# Patient Record
Sex: FEMALE | Race: ASIAN | Employment: OTHER | URBAN - METROPOLITAN AREA
[De-identification: names, ages, dates, MRNs, and addresses within clinical notes are randomized per-mention and may not be internally consistent; named-entity substitution may affect disease eponyms.]

---

## 2017-09-06 ENCOUNTER — APPOINTMENT (OUTPATIENT)
Dept: PHYSICAL THERAPY | Age: 77
End: 2017-09-06
Payer: MEDICARE

## 2018-04-16 ENCOUNTER — PRIOR ORIGINAL RECORDS (OUTPATIENT)
Dept: OTHER | Age: 78
End: 2018-04-16

## 2018-04-26 ENCOUNTER — HOSPITAL ENCOUNTER (OUTPATIENT)
Dept: CT IMAGING | Facility: HOSPITAL | Age: 78
Discharge: HOME | End: 2018-04-26
Attending: FAMILY MEDICINE

## 2018-04-26 ENCOUNTER — PRIOR ORIGINAL RECORDS (OUTPATIENT)
Dept: OTHER | Age: 78
End: 2018-04-26

## 2018-04-26 DIAGNOSIS — Z13.6 SCREENING FOR HEART DISEASE: ICD-10-CM

## 2018-04-27 ENCOUNTER — PRIOR ORIGINAL RECORDS (OUTPATIENT)
Dept: OTHER | Age: 78
End: 2018-04-27

## 2018-04-27 LAB — UFCT: 1434.33 CA SCORE

## 2018-05-08 ENCOUNTER — PRIOR ORIGINAL RECORDS (OUTPATIENT)
Dept: OTHER | Age: 78
End: 2018-05-08

## 2018-05-15 ENCOUNTER — PRIOR ORIGINAL RECORDS (OUTPATIENT)
Dept: OTHER | Age: 78
End: 2018-05-15

## 2018-05-16 ENCOUNTER — OFFICE VISIT (OUTPATIENT)
Dept: FAMILY MEDICINE CLINIC | Facility: CLINIC | Age: 78
End: 2018-05-16

## 2018-05-16 VITALS
WEIGHT: 125.19 LBS | BODY MASS INDEX: 25.58 KG/M2 | HEART RATE: 84 BPM | RESPIRATION RATE: 16 BRPM | SYSTOLIC BLOOD PRESSURE: 164 MMHG | DIASTOLIC BLOOD PRESSURE: 84 MMHG | HEIGHT: 58.66 IN

## 2018-05-16 DIAGNOSIS — Z12.39 SCREENING FOR MALIGNANT NEOPLASM OF BREAST: ICD-10-CM

## 2018-05-16 DIAGNOSIS — M06.9 RHEUMATOID ARTHRITIS INVOLVING MULTIPLE SITES, UNSPECIFIED RHEUMATOID FACTOR PRESENCE: ICD-10-CM

## 2018-05-16 DIAGNOSIS — I25.10 CORONARY ARTERY DISEASE INVOLVING NATIVE CORONARY ARTERY OF NATIVE HEART WITHOUT ANGINA PECTORIS: ICD-10-CM

## 2018-05-16 DIAGNOSIS — I77.9 ARTERIAL DISEASE (HCC): ICD-10-CM

## 2018-05-16 DIAGNOSIS — R73.03 PREDIABETES: ICD-10-CM

## 2018-05-16 DIAGNOSIS — R73.9 HYPERGLYCEMIA: ICD-10-CM

## 2018-05-16 DIAGNOSIS — Z78.0 POSTMENOPAUSAL: ICD-10-CM

## 2018-05-16 DIAGNOSIS — Z23 NEED FOR VACCINATION: ICD-10-CM

## 2018-05-16 DIAGNOSIS — Z00.00 MEDICARE ANNUAL WELLNESS VISIT, SUBSEQUENT: Primary | ICD-10-CM

## 2018-05-16 DIAGNOSIS — I10 ESSENTIAL HYPERTENSION: ICD-10-CM

## 2018-05-16 DIAGNOSIS — E78.2 MIXED HYPERLIPIDEMIA: ICD-10-CM

## 2018-05-16 PROCEDURE — G0009 ADMIN PNEUMOCOCCAL VACCINE: HCPCS | Performed by: FAMILY MEDICINE

## 2018-05-16 PROCEDURE — 90670 PCV13 VACCINE IM: CPT | Performed by: FAMILY MEDICINE

## 2018-05-16 PROCEDURE — G0438 PPPS, INITIAL VISIT: HCPCS | Performed by: FAMILY MEDICINE

## 2018-05-16 PROCEDURE — 96160 PT-FOCUSED HLTH RISK ASSMT: CPT | Performed by: FAMILY MEDICINE

## 2018-05-16 RX ORDER — ATORVASTATIN CALCIUM 10 MG/1
1 TABLET, FILM COATED ORAL DAILY
COMMUNITY
Start: 2018-04-27 | End: 2019-04-22

## 2018-05-16 RX ORDER — ACETAMINOPHEN 160 MG
2000 TABLET,DISINTEGRATING ORAL DAILY
COMMUNITY
End: 2018-07-18

## 2018-05-16 RX ORDER — LOSARTAN POTASSIUM 25 MG/1
25 TABLET ORAL DAILY
COMMUNITY
Start: 2017-05-18 | End: 2018-10-17

## 2018-05-16 NOTE — PATIENT INSTRUCTIONS
Wagner Walsh's SCREENING SCHEDULE   Tests on this list are recommended by your physician but may not be covered, or covered at this frequency, by your insurer. Please check with your insurance carrier before scheduling to verify coverage.    PREVENTATIVE lifetime   • Anyone with a family history    Colorectal Cancer Screening  Covered up to Age 76     Colonoscopy Screen   Covered every 10 years- more often if abnormal There are no preventive care reminders to display for this patient.  Update [employer] Please get every year    Pneumococcal 13 (Prevnar)  Covered Once after 65 No orders found for this or any previous visit.  Please get once after your 65th birthday    Pneumococcal 23 (Pneumovax)  Covered Once after 65 No orders found for this or any previou

## 2018-05-18 ENCOUNTER — PRIOR ORIGINAL RECORDS (OUTPATIENT)
Dept: OTHER | Age: 78
End: 2018-05-18

## 2018-05-25 ENCOUNTER — HOSPITAL ENCOUNTER (OUTPATIENT)
Dept: CV DIAGNOSTICS | Facility: HOSPITAL | Age: 78
Discharge: HOME | End: 2018-05-25
Attending: INTERNAL MEDICINE
Payer: MEDICARE

## 2018-05-25 DIAGNOSIS — R93.1 ABNORMAL CT SCAN OF HEART: ICD-10-CM

## 2018-05-25 DIAGNOSIS — R07.9 CHEST PAIN: ICD-10-CM

## 2018-05-25 PROCEDURE — 93018 CV STRESS TEST I&R ONLY: CPT | Performed by: INTERNAL MEDICINE

## 2018-05-25 PROCEDURE — 78452 HT MUSCLE IMAGE SPECT MULT: CPT | Performed by: INTERNAL MEDICINE

## 2018-05-25 PROCEDURE — 93017 CV STRESS TEST TRACING ONLY: CPT | Performed by: INTERNAL MEDICINE

## 2018-05-29 ENCOUNTER — PRIOR ORIGINAL RECORDS (OUTPATIENT)
Dept: OTHER | Age: 78
End: 2018-05-29

## 2018-06-09 ENCOUNTER — PATIENT OUTREACH (OUTPATIENT)
Dept: FAMILY MEDICINE CLINIC | Facility: CLINIC | Age: 78
End: 2018-06-09

## 2018-06-13 ENCOUNTER — LAB ENCOUNTER (OUTPATIENT)
Dept: LAB | Age: 78
End: 2018-06-13
Attending: INTERNAL MEDICINE
Payer: MEDICARE

## 2018-06-13 ENCOUNTER — PRIOR ORIGINAL RECORDS (OUTPATIENT)
Dept: OTHER | Age: 78
End: 2018-06-13

## 2018-06-13 DIAGNOSIS — E78.00 PURE HYPERCHOLESTEROLEMIA: Primary | ICD-10-CM

## 2018-06-13 PROCEDURE — 36415 COLL VENOUS BLD VENIPUNCTURE: CPT

## 2018-06-13 PROCEDURE — 80061 LIPID PANEL: CPT

## 2018-06-13 PROCEDURE — 80053 COMPREHEN METABOLIC PANEL: CPT

## 2018-06-19 ENCOUNTER — PRIOR ORIGINAL RECORDS (OUTPATIENT)
Dept: OTHER | Age: 78
End: 2018-06-19

## 2018-06-19 LAB
ALBUMIN: 4.5 G/DL
ALKALINE PHOSPHATATE(ALK PHOS): 59 IU/L
ALT (SGPT): 28 U/L
AST (SGOT): 20 U/L
BILIRUBIN TOTAL: 0.6 MG/DL
BUN: 15 MG/DL
CALCIUM: 9.6 MG/DL
CHLORIDE: 105 MEQ/L
CHOLESTEROL, TOTAL: 164 MG/DL
CREATININE, SERUM: 0.6 MG/DL
GLUCOSE: 125 MG/DL
GLUCOSE: 125 MG/DL
HDL CHOLESTEROL: 75 MG/DL
LDL CHOLESTEROL: 64 MG/DL
NON-HDL CHOLESTEROL: 89 MG/DL
POTASSIUM, SERUM: 4.1 MEQ/L
PROTEIN, TOTAL: 8.4 G/DL
SGOT (AST): 20 IU/L
SGPT (ALT): 28 IU/L
SODIUM: 141 MEQ/L
TRIGLYCERIDES: 124 MG/DL

## 2018-06-26 ENCOUNTER — TELEPHONE (OUTPATIENT)
Dept: FAMILY MEDICINE CLINIC | Facility: CLINIC | Age: 78
End: 2018-06-26

## 2018-06-26 ENCOUNTER — PRIOR ORIGINAL RECORDS (OUTPATIENT)
Dept: OTHER | Age: 78
End: 2018-06-26

## 2018-06-26 NOTE — TELEPHONE ENCOUNTER
Wagner would like a phone call from the office regarding her test results, she has a few more questions, please call her at [phone]

## 2018-06-26 NOTE — TELEPHONE ENCOUNTER
Pt calling to verify when she needs to have labs completed, pt informed labs needs to be completed around July 16.   Future Appointments  Date Time Provider Bart Aaron   7/18/2018 8:30 AM Tony Stallings DO [facility]

## 2018-07-11 ENCOUNTER — PRIOR ORIGINAL RECORDS (OUTPATIENT)
Dept: OTHER | Age: 78
End: 2018-07-11

## 2018-07-11 ENCOUNTER — TELEPHONE (OUTPATIENT)
Dept: FAMILY MEDICINE CLINIC | Facility: CLINIC | Age: 78
End: 2018-07-11

## 2018-07-11 RX ORDER — GLUCOSAMINE HCL/CHONDROITIN SU 500-400 MG
CAPSULE ORAL
Qty: 100 STRIP | Refills: 1 | Status: SHIPPED | OUTPATIENT
Start: 2018-07-11 | End: 2018-07-11

## 2018-07-11 RX ORDER — LANCETS 30 GAUGE
EACH MISCELLANEOUS
Qty: 100 EACH | Refills: 1 | Status: SHIPPED | OUTPATIENT
Start: 2018-07-11 | End: 2018-07-11

## 2018-07-11 RX ORDER — LANCETS 30 GAUGE
EACH MISCELLANEOUS
Qty: 100 EACH | Refills: 1 | Status: SHIPPED | OUTPATIENT
Start: 2018-07-11 | End: 2021-06-01

## 2018-07-11 RX ORDER — BLOOD SUGAR DIAGNOSTIC, DRUM
STRIP MISCELLANEOUS
Qty: 306 STRIP | Refills: 1 | Status: SHIPPED | OUTPATIENT
Start: 2018-07-11 | End: 2019-01-10

## 2018-07-11 NOTE — TELEPHONE ENCOUNTER
Pt called in and said that she is asking for diabetic supplies she used to check her blood sugars in the past but has stopped and would like to have new supplies sent in to her pharmacy to be able to re-check    All she needs is the lancets and strips (the

## 2018-07-16 ENCOUNTER — PRIOR ORIGINAL RECORDS (OUTPATIENT)
Dept: OTHER | Age: 78
End: 2018-07-16

## 2018-07-16 ENCOUNTER — APPOINTMENT (OUTPATIENT)
Dept: LAB | Age: 78
End: 2018-07-16
Attending: FAMILY MEDICINE
Payer: MEDICARE

## 2018-07-16 DIAGNOSIS — R73.9 HYPERGLYCEMIA: ICD-10-CM

## 2018-07-16 DIAGNOSIS — R73.03 PREDIABETES: ICD-10-CM

## 2018-07-16 DIAGNOSIS — I10 ESSENTIAL HYPERTENSION: ICD-10-CM

## 2018-07-16 LAB
EST. AVERAGE GLUCOSE BLD GHB EST-MCNC: 140 MG/DL (ref 68–126)
FREE T4: 1 NG/DL (ref 0.9–1.8)
HBA1C MFR BLD HPLC: 6.5 % (ref ?–5.7)
TSI SER-ACNC: 1.37 MIU/ML (ref 0.35–5.5)

## 2018-07-16 PROCEDURE — 83036 HEMOGLOBIN GLYCOSYLATED A1C: CPT

## 2018-07-16 PROCEDURE — 84439 ASSAY OF FREE THYROXINE: CPT

## 2018-07-16 PROCEDURE — 84443 ASSAY THYROID STIM HORMONE: CPT

## 2018-07-16 PROCEDURE — 36415 COLL VENOUS BLD VENIPUNCTURE: CPT

## 2018-07-18 ENCOUNTER — PRIOR ORIGINAL RECORDS (OUTPATIENT)
Dept: OTHER | Age: 78
End: 2018-07-18

## 2018-07-18 ENCOUNTER — TELEPHONE (OUTPATIENT)
Dept: FAMILY MEDICINE CLINIC | Facility: CLINIC | Age: 78
End: 2018-07-18

## 2018-07-18 ENCOUNTER — OFFICE VISIT (OUTPATIENT)
Dept: FAMILY MEDICINE CLINIC | Facility: CLINIC | Age: 78
End: 2018-07-18
Payer: MEDICARE

## 2018-07-18 VITALS
BODY MASS INDEX: 25.58 KG/M2 | DIASTOLIC BLOOD PRESSURE: 74 MMHG | HEART RATE: 79 BPM | SYSTOLIC BLOOD PRESSURE: 156 MMHG | WEIGHT: 125.19 LBS | RESPIRATION RATE: 16 BRPM | HEIGHT: 58.66 IN

## 2018-07-18 DIAGNOSIS — I25.10 CORONARY ARTERY DISEASE INVOLVING NATIVE CORONARY ARTERY OF NATIVE HEART WITHOUT ANGINA PECTORIS: ICD-10-CM

## 2018-07-18 DIAGNOSIS — I10 ESSENTIAL HYPERTENSION: ICD-10-CM

## 2018-07-18 DIAGNOSIS — I77.9 ARTERIAL DISEASE (HCC): ICD-10-CM

## 2018-07-18 DIAGNOSIS — R21 RASH: ICD-10-CM

## 2018-07-18 DIAGNOSIS — R73.9 HYPERGLYCEMIA: Primary | ICD-10-CM

## 2018-07-18 PROCEDURE — 99214 OFFICE O/P EST MOD 30 MIN: CPT | Performed by: FAMILY MEDICINE

## 2018-07-18 RX ORDER — EZETIMIBE 10 MG/1
10 TABLET ORAL NIGHTLY
COMMUNITY
End: 2018-07-19 | Stop reason: SINTOL

## 2018-07-18 RX ORDER — CLOTRIMAZOLE AND BETAMETHASONE DIPROPIONATE 10; .64 MG/G; MG/G
1 CREAM TOPICAL 2 TIMES DAILY
Qty: 45 G | Refills: 0 | Status: SHIPPED | OUTPATIENT
Start: 2018-07-18 | End: 2018-12-27 | Stop reason: ALTCHOICE

## 2018-07-18 NOTE — PATIENT INSTRUCTIONS
Established High Blood Pressure    High blood pressure (hypertension) is a chronic disease. Often, healthcare providers don’t know what causes it. But it can be caused by certain health conditions and medicines.   If you have high blood pressure, you may · Start an exercise program. Talk with your healthcare provider about the type of exercise program that would be best for you. It doesn't have to be hard. Even brisk walking for 20 minutes 3 times a week is a good form of exercise.   · Don’t take medicines · Sit with your back supported (don't sit on a couch or soft chair); keep your feet on the floor uncrossed. Place your arm on a solid flat surface (like a table) with the upper part of the arm at heart level.  Place the middle of the cuff directly above the · You have problems speaking or seeing   Date Last Reviewed: 12/1/2016  © 4360-5528 [employer]. [address] [facility] [place], [address] [place] [address]. All rights reserved. This information is not intended as a substitute for professional medical care.  A

## 2018-07-18 NOTE — PROGRESS NOTES
Bolivar Oglesby is a 69year old female. HPI:     HTN:    Uncontrolled. Severity is mild, patient is on one agent for her hypertension. Pt has been taking medications as instructed, no medication side effects.    Diet: Has been more closely watching he • Prediabetes 2018   • Rheumatoid arthritis involving multiple sites ([facility] [address] 2018   • Rheumatoid arthritis(714.0)    • Unspecified essential hypertension       Past Surgical History:  No date:   2017: COLONOSCOPY  2017: EGD normal        DATA:    Component      Latest Ref Rng & Units 7/16/2018 6/13/2018 2/27/2018   CHOLESTEROL, TOTAL      <200 mg/dL  164 255   Triglycerides      <150 mg/dL  124 157   HDL Cholesterol      >45 mg/dL  75 71   LDL Cholesterol Calc      <130 mg/dL aspirin 81 mg.      4. Coronary artery disease involving native coronary artery of native heart without angina pectoris  As above in #3.    5. Essential hypertension  Uncontrolled. Per patient she is taking the losartan every day and does not miss doses.

## 2018-07-18 NOTE — TELEPHONE ENCOUNTER
Wagner is calling to let Dr Felisha Barksdale know that she called Dr Alina Rodríguez office regarding her cholesteral med that she was having a reaction to.  Please call Wagner so she can let the nurse know what Dr Sheila Wright would like her to do, [phone]

## 2018-07-19 NOTE — TELEPHONE ENCOUNTER
Jose Stacy is returning a call to Maurice Guerra regarding her Cholesteral, please call her at [phone]

## 2018-07-19 NOTE — TELEPHONE ENCOUNTER
Spoke to patient and she said she called Dr. Kvng Arnold office and pt was told to stop taking it ezetimibe and she states she feels much better. She will be doing labs in 3 weeks    She just wanted to let you know.

## 2018-07-24 ENCOUNTER — PRIOR ORIGINAL RECORDS (OUTPATIENT)
Dept: OTHER | Age: 78
End: 2018-07-24

## 2018-08-27 ENCOUNTER — TELEPHONE (OUTPATIENT)
Dept: FAMILY MEDICINE CLINIC | Facility: CLINIC | Age: 78
End: 2018-08-27

## 2018-08-27 ENCOUNTER — PRIOR ORIGINAL RECORDS (OUTPATIENT)
Dept: OTHER | Age: 78
End: 2018-08-27

## 2018-08-27 NOTE — TELEPHONE ENCOUNTER
Dorothea Grossman is having a lot of pain in her hands her Rheumotoid arthritis is really bothering her, she would like to see if she can get some medication for her pain, please call Wagner at [phone]

## 2018-08-29 NOTE — TELEPHONE ENCOUNTER
What has the patient tried for the pain so far? If she did take something did not help a little, not at all or a lot?   Does she have a rheumatologist?

## 2018-09-01 ENCOUNTER — HOSPITAL ENCOUNTER (OUTPATIENT)
Dept: MAMMOGRAPHY | Age: 78
Discharge: HOME | End: 2018-09-01
Attending: FAMILY MEDICINE
Payer: MEDICARE

## 2018-09-01 ENCOUNTER — HOSPITAL ENCOUNTER (OUTPATIENT)
Dept: BONE DENSITY | Age: 78
Discharge: HOME | End: 2018-09-01
Attending: FAMILY MEDICINE
Payer: MEDICARE

## 2018-09-01 DIAGNOSIS — Z78.0 POSTMENOPAUSAL: ICD-10-CM

## 2018-09-01 DIAGNOSIS — Z12.39 SCREENING FOR MALIGNANT NEOPLASM OF BREAST: ICD-10-CM

## 2018-09-01 PROCEDURE — 77063 BREAST TOMOSYNTHESIS BI: CPT | Performed by: FAMILY MEDICINE

## 2018-09-01 PROCEDURE — 77080 DXA BONE DENSITY AXIAL: CPT | Performed by: FAMILY MEDICINE

## 2018-09-01 PROCEDURE — 77067 SCR MAMMO BI INCL CAD: CPT | Performed by: FAMILY MEDICINE

## 2018-09-11 ENCOUNTER — TELEPHONE (OUTPATIENT)
Dept: FAMILY MEDICINE CLINIC | Facility: CLINIC | Age: 78
End: 2018-09-11

## 2018-09-11 NOTE — TELEPHONE ENCOUNTER
----- Message from Bran Ibarra DO sent at 9/8/2018 10:42 PM CDT -----  Please inform patient that her bone density is normal. Bone mineral density values are within normal range when compared to normal patient population.

## 2018-09-11 NOTE — TELEPHONE ENCOUNTER
The patient was informed of her test results and Dr. Erin Dobbs recommendations. She verbalized understanding and has no further questions at this time.

## 2018-10-17 ENCOUNTER — TELEPHONE (OUTPATIENT)
Dept: FAMILY MEDICINE CLINIC | Facility: CLINIC | Age: 78
End: 2018-10-17

## 2018-10-17 RX ORDER — LOSARTAN POTASSIUM 25 MG/1
25 TABLET ORAL DAILY
Qty: 90 TABLET | Refills: 0 | Status: SHIPPED | OUTPATIENT
Start: 2018-10-17 | End: 2018-11-01

## 2018-10-17 NOTE — TELEPHONE ENCOUNTER
Wagner needs a refill on her Losartan Potassium 25 MG Oral Tab sent to her [address] on file, the pharmacy has sent Dr Korey Blancas a request but has not heard back.

## 2018-10-22 ENCOUNTER — PRIOR ORIGINAL RECORDS (OUTPATIENT)
Dept: OTHER | Age: 78
End: 2018-10-22

## 2018-10-22 ENCOUNTER — LAB ENCOUNTER (OUTPATIENT)
Dept: LAB | Age: 78
End: 2018-10-22
Attending: FAMILY MEDICINE
Payer: MEDICARE

## 2018-10-22 DIAGNOSIS — E78.00 PURE HYPERCHOLESTEROLEMIA: ICD-10-CM

## 2018-10-22 DIAGNOSIS — R73.9 HYPERGLYCEMIA: Primary | ICD-10-CM

## 2018-10-22 PROCEDURE — 83036 HEMOGLOBIN GLYCOSYLATED A1C: CPT

## 2018-10-22 PROCEDURE — 80061 LIPID PANEL: CPT

## 2018-10-22 PROCEDURE — 36415 COLL VENOUS BLD VENIPUNCTURE: CPT

## 2018-10-22 PROCEDURE — 80053 COMPREHEN METABOLIC PANEL: CPT

## 2018-10-23 LAB
ALBUMIN: 4.8 G/DL
ALKALINE PHOSPHATATE(ALK PHOS): 62 IU/L
ALT (SGPT): 25 U/L
AST (SGOT): 19 U/L
BILIRUBIN TOTAL: 0.5 MG/DL
BUN: 19 MG/DL
CALCIUM: 9.5 MG/DL
CHLORIDE: 104 MEQ/L
CHOLESTEROL, TOTAL: 168 MG/DL
CREATININE, SERUM: 0.64 MG/DL
GLOBULIN: 4.1 G/DL
GLUCOSE: 140 MG/DL
GLUCOSE: 140 MG/DL
HDL CHOLESTEROL: 78 MG/DL
HEMOGLOBIN A1C: 6.6 %
LDL CHOLESTEROL: 70 MG/DL
NON-HDL CHOLESTEROL: 90 MG/DL
POTASSIUM, SERUM: 4 MEQ/L
PROTEIN, TOTAL: 8.9 G/DL
SGOT (AST): 19 IU/L
SGPT (ALT): 25 IU/L
SODIUM: 139 MEQ/L
TRIGLYCERIDES: 100 MG/DL

## 2018-10-29 ENCOUNTER — MYAURORA ACCOUNT LINK (OUTPATIENT)
Dept: OTHER | Age: 78
End: 2018-10-29

## 2018-10-29 ENCOUNTER — PRIOR ORIGINAL RECORDS (OUTPATIENT)
Dept: OTHER | Age: 78
End: 2018-10-29

## 2018-11-01 ENCOUNTER — OFFICE VISIT (OUTPATIENT)
Dept: FAMILY MEDICINE CLINIC | Facility: CLINIC | Age: 78
End: 2018-11-01
Payer: MEDICARE

## 2018-11-01 VITALS
WEIGHT: 125 LBS | BODY MASS INDEX: 26 KG/M2 | SYSTOLIC BLOOD PRESSURE: 126 MMHG | HEART RATE: 74 BPM | DIASTOLIC BLOOD PRESSURE: 62 MMHG

## 2018-11-01 DIAGNOSIS — I10 ESSENTIAL HYPERTENSION: ICD-10-CM

## 2018-11-01 DIAGNOSIS — Z28.21 INFLUENZA VACCINATION DECLINED BY PATIENT: ICD-10-CM

## 2018-11-01 DIAGNOSIS — R77.9 ELEVATED SERUM PROTEIN LEVEL: ICD-10-CM

## 2018-11-01 DIAGNOSIS — E11.9 TYPE 2 DIABETES MELLITUS WITHOUT COMPLICATION, WITHOUT LONG-TERM CURRENT USE OF INSULIN (HCC): Primary | ICD-10-CM

## 2018-11-01 PROBLEM — R73.9 HYPERGLYCEMIA: Status: RESOLVED | Noted: 2018-05-16 | Resolved: 2018-11-01

## 2018-11-01 PROBLEM — R73.03 PREDIABETES: Status: RESOLVED | Noted: 2018-05-16 | Resolved: 2018-11-01

## 2018-11-01 PROCEDURE — 99214 OFFICE O/P EST MOD 30 MIN: CPT | Performed by: FAMILY MEDICINE

## 2018-11-01 RX ORDER — PETROLATUM,WHITE/LANOLIN
OINTMENT (GRAM) TOPICAL DAILY
COMMUNITY
End: 2021-03-29

## 2018-11-01 RX ORDER — MULTIVIT-MIN/IRON/FOLIC ACID/K 18-600-40
CAPSULE ORAL
COMMUNITY
End: 2019-04-22 | Stop reason: CLARIF

## 2018-11-01 RX ORDER — LOSARTAN POTASSIUM AND HYDROCHLOROTHIAZIDE 12.5; 5 MG/1; MG/1
1 TABLET ORAL DAILY
COMMUNITY
End: 2018-12-27

## 2018-11-01 NOTE — PROGRESS NOTES
Ray Cardenas is a 69year old female. HPI:     HTN:    Improving. Severity is mild, patient is on 2 agents for her hypertension.   Patient recently had medication changed by her cardiologist.  Tolerating medication change well, no side effects notice Rheumatoid arthritis(714.0)    • Unspecified essential hypertension       Past Surgical History:   Procedure Laterality Date   •      • COLONOSCOPY  2017   • EGD  2017   • ORAL SURGERY PROCEDURE        Social History:    Social History interview        DATA:    Component      Latest Ref Rng & Units 10/22/2018 7/16/2018 6/13/2018   CHOLESTEROL, TOTAL      <200 mg/dL 168  164   Triglycerides      30 - 149 mg/dL 100  124   HDL Cholesterol      40 - 59 mg/dL 78 (H)  75   LDL Cholesterol Calc

## 2018-11-02 LAB
FREE T4: 1 MG/DL
HEMOGLOBIN A1C: 6.5 %
THYROID STIMULATING HORMONE: 1.37 MLU/L

## 2018-11-05 ENCOUNTER — APPOINTMENT (OUTPATIENT)
Dept: LAB | Age: 78
End: 2018-11-05
Attending: FAMILY MEDICINE
Payer: MEDICARE

## 2018-11-05 DIAGNOSIS — R77.9 ELEVATED SERUM PROTEIN LEVEL: ICD-10-CM

## 2018-11-05 PROCEDURE — 86334 IMMUNOFIX E-PHORESIS SERUM: CPT

## 2018-11-05 PROCEDURE — 83883 ASSAY NEPHELOMETRY NOT SPEC: CPT

## 2018-11-05 PROCEDURE — 84165 PROTEIN E-PHORESIS SERUM: CPT

## 2018-11-05 PROCEDURE — 36415 COLL VENOUS BLD VENIPUNCTURE: CPT

## 2018-11-07 ENCOUNTER — DIABETIC EDUCATION (OUTPATIENT)
Dept: ENDOCRINOLOGY CLINIC | Facility: CLINIC | Age: 78
End: 2018-11-07
Payer: MEDICARE

## 2018-11-07 DIAGNOSIS — E11.9 TYPE 2 DIABETES MELLITUS WITHOUT COMPLICATION, WITHOUT LONG-TERM CURRENT USE OF INSULIN (HCC): Primary | ICD-10-CM

## 2018-11-07 PROCEDURE — 97802 MEDICAL NUTRITION INDIV IN: CPT | Performed by: DIETITIAN, REGISTERED

## 2018-11-07 NOTE — PROGRESS NOTES
Robert Luna  [id number] was seen for Diabetic Medical Nutrition Therapy:    Date: 11/7/2018  Start time: 11:00 End time: 12:00    Assessment: There were no vitals taken for this visit.   HgbA1C (%)   Date Value   10/22/2018 6.6 (H)   02/27/2018 5.7

## 2018-11-27 ENCOUNTER — TELEPHONE (OUTPATIENT)
Dept: FAMILY MEDICINE CLINIC | Facility: CLINIC | Age: 78
End: 2018-11-27

## 2018-11-27 NOTE — TELEPHONE ENCOUNTER
----- Message from Caridad Brito DO sent at 11/26/2018  9:14 PM CST -----  Please inform patient blood test is okay.

## 2018-11-27 NOTE — TELEPHONE ENCOUNTER
Spoke with patient and informed her of lab results. Pt verbalized understanding and had no questions or concerns at this time.

## 2018-12-14 ENCOUNTER — PRIOR ORIGINAL RECORDS (OUTPATIENT)
Dept: OTHER | Age: 78
End: 2018-12-14

## 2018-12-17 ENCOUNTER — PRIOR ORIGINAL RECORDS (OUTPATIENT)
Dept: OTHER | Age: 78
End: 2018-12-17

## 2018-12-26 ENCOUNTER — TELEPHONE (OUTPATIENT)
Dept: INTERNAL MEDICINE CLINIC | Facility: CLINIC | Age: 78
End: 2018-12-26

## 2018-12-26 NOTE — TELEPHONE ENCOUNTER
Pt was seen by Holli Gould on 11/07 for diabetic education and was given recommendation regarding her diet. She stated that she was recently diagnosed rheumatoid and due to this she will need to be in a more strict diet. She was wondering if Jessica Loyola has recommendation.

## 2018-12-27 ENCOUNTER — OFFICE VISIT (OUTPATIENT)
Dept: RHEUMATOLOGY | Facility: CLINIC | Age: 78
End: 2018-12-27
Payer: MEDICARE

## 2018-12-27 VITALS
RESPIRATION RATE: 20 BRPM | BODY MASS INDEX: 26.24 KG/M2 | WEIGHT: 125 LBS | SYSTOLIC BLOOD PRESSURE: 144 MMHG | DIASTOLIC BLOOD PRESSURE: 68 MMHG | HEART RATE: 76 BPM | HEIGHT: 58 IN

## 2018-12-27 DIAGNOSIS — M25.552 LEFT HIP PAIN: ICD-10-CM

## 2018-12-27 DIAGNOSIS — M15.9 PRIMARY OSTEOARTHRITIS INVOLVING MULTIPLE JOINTS: ICD-10-CM

## 2018-12-27 DIAGNOSIS — M79.641 BILATERAL HAND PAIN: ICD-10-CM

## 2018-12-27 DIAGNOSIS — H04.123 DRY EYE SYNDROME OF BOTH EYES: ICD-10-CM

## 2018-12-27 DIAGNOSIS — M06.9 RHEUMATOID ARTHRITIS INVOLVING BOTH HANDS, UNSPECIFIED RHEUMATOID FACTOR PRESENCE: Primary | ICD-10-CM

## 2018-12-27 DIAGNOSIS — M79.642 BILATERAL HAND PAIN: ICD-10-CM

## 2018-12-27 PROCEDURE — 99205 OFFICE O/P NEW HI 60 MIN: CPT | Performed by: INTERNAL MEDICINE

## 2018-12-27 RX ORDER — PREDNISONE 1 MG/1
TABLET ORAL
Qty: 50 TABLET | Refills: 0 | Status: SHIPPED | OUTPATIENT
Start: 2018-12-27 | End: 2019-04-22

## 2018-12-27 RX ORDER — LOSARTAN POTASSIUM 25 MG/1
25 TABLET ORAL DAILY
COMMUNITY
End: 2019-06-17

## 2018-12-27 RX ORDER — HYDROXYCHLOROQUINE SULFATE 200 MG/1
200 TABLET, FILM COATED ORAL DAILY
Qty: 90 TABLET | Refills: 0 | Status: SHIPPED | OUTPATIENT
Start: 2018-12-27 | End: 2019-04-22

## 2018-12-27 NOTE — PROGRESS NOTES
?  RHEUMATOLOGY NEW PATIENT   Date of visit: 12/27/2018  ?   Patient presents with:  Establish Care: new Patient referred by PCP with RA-was in remission, Was treated 16 years ago, was on Methotrexate and developed anemia, MTX and celebrex along with NSAI stools. Rarely, Plaquenil can also be associated with myopathy. This was discussed in detail, and it was agreed that the benefit of this medication outweighs its risk. Will also get updated bilateral hand x-rays as well as bilateral hip x-rays.   I belie (MIN 3 VIEWS), LEFT (CPT=73130); Future  -     XR HAND (MIN 3 VIEWS), RIGHT (CPT=73130); Future    Left hip pain  -     XR HIP W OR WO PELVIS 3 OR 4 VIEWS, BILAT(CPT=73522);  Future    Primary osteoarthritis involving multiple joints    Dry eye syndrome of over her joints lasting about 2-3 hours each more. Does have significant fatigue since the pain started. Has had some hair loss.    Does get a redness and itching over her thigh and calf at times, states she is very sensitive to different perfumes and l      • COLONOSCOPY  2017   • EGD  2017   • ORAL SURGERY PROCEDURE       Family History:  No family history on file.   Social History:  Social History    Tobacco Use      Smoking status: Never Smoker      Smokeless tobacco: Never Used breath. Cardiovascular: Negative for chest pain, palpitations and leg swelling. Gastrointestinal: Negative for constipation and heartburn. Genitourinary: Negative for dysuria, frequency and urgency.    Musculoskeletal: Positive for falls and myalgias Left MCP 1 tender and swollen  [place] neck deformities noted of right middle and left pinky finger.   Early boutonierre deformities of bilateral thumbs    No other swelling, tenderness, redness or restriction of motion of the DIPs, PIPs, MCPs, wrists, elbow additional findings.       =====  CONCLUSION:  Bone mineral density values are within normal range when compared to normal patient population.         Labs:  Lab Results   Component Value Date    WBC 8.2 09/12/2014    RBC 3.82 09/12/2014    HGB 12.3 09/12/2

## 2018-12-27 NOTE — PATIENT INSTRUCTIONS
-- start prednisone taper tomorrow (20mg daily x 5 days then decrease by 5mg every 5 days, follow directions on prescription). Be sure to monitor your blood pressure and blood sugar closely.   -- start plaquenil daily and be sure to see eye doctor   -- read mouth with a glass of water. Follow the directions on the prescription label. Avoid taking antacids within 4 hours of taking this medicine. It is best to separate these medicines by at least 4 hours. Do not cut, crush or chew this medicine.  You can take it doctor or health care professional if they continue or are bothersome):  · anxious  · diarrhea  · dizziness  · hair loss  · headache  · irritable  · loss of appetite  · nausea, vomiting  · stomach pain  What may interact with this medicine?   Do not take th start to get better or if they get worse. Avoid taking antacids within 4 hours of taking this medicine. It is best to separate these medicines by at least 4 hours.   Tell your doctor or health care professional right away if you have any change in your eye

## 2019-01-10 ENCOUNTER — TELEPHONE (OUTPATIENT)
Dept: FAMILY MEDICINE CLINIC | Facility: CLINIC | Age: 79
End: 2019-01-10

## 2019-01-10 RX ORDER — BLOOD SUGAR DIAGNOSTIC, DRUM
STRIP MISCELLANEOUS
Qty: 200 STRIP | Refills: 1 | Status: SHIPPED | OUTPATIENT
Start: 2019-01-10 | End: 2021-06-01

## 2019-01-10 NOTE — TELEPHONE ENCOUNTER
Pt called and said she tests twice a day and her pharmacy does not know how many test strips to give her. She also said the meter she has is very complicated to use. The people at [place] do not help her very well.   She said she will have [facility] con

## 2019-02-02 ENCOUNTER — LAB ENCOUNTER (OUTPATIENT)
Dept: LAB | Age: 79
End: 2019-02-02
Attending: INTERNAL MEDICINE
Payer: MEDICARE

## 2019-02-02 ENCOUNTER — HOSPITAL ENCOUNTER (OUTPATIENT)
Dept: GENERAL RADIOLOGY | Age: 79
Discharge: HOME | End: 2019-02-02
Attending: INTERNAL MEDICINE
Payer: MEDICARE

## 2019-02-02 DIAGNOSIS — M06.9 RHEUMATOID ARTHRITIS INVOLVING BOTH HANDS, UNSPECIFIED RHEUMATOID FACTOR PRESENCE: ICD-10-CM

## 2019-02-02 DIAGNOSIS — E11.9 TYPE 2 DIABETES MELLITUS WITHOUT COMPLICATION, WITHOUT LONG-TERM CURRENT USE OF INSULIN (HCC): ICD-10-CM

## 2019-02-02 DIAGNOSIS — M79.642 BILATERAL HAND PAIN: ICD-10-CM

## 2019-02-02 DIAGNOSIS — M79.641 BILATERAL HAND PAIN: ICD-10-CM

## 2019-02-02 DIAGNOSIS — H04.123 DRY EYE SYNDROME OF BOTH EYES: ICD-10-CM

## 2019-02-02 DIAGNOSIS — M25.552 LEFT HIP PAIN: ICD-10-CM

## 2019-02-02 LAB
BASOPHILS # BLD AUTO: 0.02 X10(3) UL (ref 0–0.2)
BASOPHILS NFR BLD AUTO: 0.4 %
CRP SERPL-MCNC: 0.87 MG/DL (ref ?–1)
DEPRECATED RDW RBC AUTO: 44.6 FL (ref 35.1–46.3)
EOSINOPHIL # BLD AUTO: 0.12 X10(3) UL (ref 0–0.7)
EOSINOPHIL NFR BLD AUTO: 2.4 %
ERYTHROCYTE [DISTWIDTH] IN BLOOD BY AUTOMATED COUNT: 12.3 % (ref 11–15)
EST. AVERAGE GLUCOSE BLD GHB EST-MCNC: 151 MG/DL (ref 68–126)
HBA1C MFR BLD HPLC: 6.9 % (ref ?–5.7)
HCT VFR BLD AUTO: 36.4 % (ref 35–48)
HGB BLD-MCNC: 11.9 G/DL (ref 12–16)
IMM GRANULOCYTES # BLD AUTO: 0.01 X10(3) UL (ref 0–1)
IMM GRANULOCYTES NFR BLD: 0.2 %
LYMPHOCYTES # BLD AUTO: 1.28 X10(3) UL (ref 1–4)
LYMPHOCYTES NFR BLD AUTO: 25.3 %
MCH RBC QN AUTO: 32.2 PG (ref 26–34)
MCHC RBC AUTO-ENTMCNC: 32.7 G/DL (ref 31–37)
MCV RBC AUTO: 98.4 FL (ref 80–100)
MONOCYTES # BLD AUTO: 0.27 X10(3) UL (ref 0.1–1)
MONOCYTES NFR BLD AUTO: 5.3 %
NEUTROPHILS # BLD AUTO: 3.35 X10 (3) UL (ref 1.5–7.7)
NEUTROPHILS # BLD AUTO: 3.35 X10(3) UL (ref 1.5–7.7)
NEUTROPHILS NFR BLD AUTO: 66.4 %
PLATELET # BLD AUTO: 251 10(3)UL (ref 150–450)
RBC # BLD AUTO: 3.7 X10(6)UL (ref 3.8–5.3)
RHEUMATOID FACT SERPL-ACNC: 10 IU/ML (ref ?–15)
SED RATE-ML: 15 MM/HR (ref 0–25)
WBC # BLD AUTO: 5.1 X10(3) UL (ref 4–11)

## 2019-02-02 PROCEDURE — 83036 HEMOGLOBIN GLYCOSYLATED A1C: CPT

## 2019-02-02 PROCEDURE — 36415 COLL VENOUS BLD VENIPUNCTURE: CPT

## 2019-02-02 PROCEDURE — 86225 DNA ANTIBODY NATIVE: CPT

## 2019-02-02 PROCEDURE — 73523 X-RAY EXAM HIPS BI 5/> VIEWS: CPT | Performed by: INTERNAL MEDICINE

## 2019-02-02 PROCEDURE — 86431 RHEUMATOID FACTOR QUANT: CPT

## 2019-02-02 PROCEDURE — 85025 COMPLETE CBC W/AUTO DIFF WBC: CPT

## 2019-02-02 PROCEDURE — 73130 X-RAY EXAM OF HAND: CPT | Performed by: INTERNAL MEDICINE

## 2019-02-02 PROCEDURE — 86200 CCP ANTIBODY: CPT

## 2019-02-02 PROCEDURE — 86235 NUCLEAR ANTIGEN ANTIBODY: CPT

## 2019-02-02 PROCEDURE — 86140 C-REACTIVE PROTEIN: CPT

## 2019-02-02 PROCEDURE — 85652 RBC SED RATE AUTOMATED: CPT

## 2019-02-02 PROCEDURE — 86038 ANTINUCLEAR ANTIBODIES: CPT

## 2019-02-04 LAB
ANA SCREEN: POSITIVE
CENTROMERE AUTOAB: <100 AU/ML (ref ?–100)
CYCLIC CITRULLINATED PEPTIDE: 181 UNITS
DSDNA AUTOAB: <100 IU/ML (ref ?–100)
HISTONE AUTOAB: <100 AU/ML (ref ?–100)
JO-1 AUTOAB: <100 AU/ML (ref ?–100)
RNP AUTOAB: 154 AU/ML (ref ?–100)
SCL-70 AUTOAB: <100 AU/ML (ref ?–100)
SM AUTOAB (SMITH): <100 AU/ML (ref ?–100)
SSA AUTOAB: 168 AU/ML (ref ?–100)
SSB AUTOAB: <100 AU/ML (ref ?–100)

## 2019-02-06 ENCOUNTER — TELEPHONE (OUTPATIENT)
Dept: RHEUMATOLOGY | Facility: CLINIC | Age: 79
End: 2019-02-06

## 2019-02-07 ENCOUNTER — TELEPHONE (OUTPATIENT)
Dept: FAMILY MEDICINE CLINIC | Facility: CLINIC | Age: 79
End: 2019-02-07

## 2019-02-07 NOTE — TELEPHONE ENCOUNTER
----- Message from Karla Mcfarland DO sent at 2/6/2019  9:54 PM CST -----  Please call patient: Please have patient make an appointment to see me for follow-up, A1c is a little worse.     Spoke to patient and she made appt for 2/18 at 12 noon

## 2019-02-11 ENCOUNTER — OFFICE VISIT (OUTPATIENT)
Dept: RHEUMATOLOGY | Facility: CLINIC | Age: 79
End: 2019-02-11
Payer: MEDICARE

## 2019-02-11 VITALS
SYSTOLIC BLOOD PRESSURE: 160 MMHG | RESPIRATION RATE: 20 BRPM | TEMPERATURE: 98 F | HEART RATE: 87 BPM | BODY MASS INDEX: 26.24 KG/M2 | DIASTOLIC BLOOD PRESSURE: 85 MMHG | HEIGHT: 58 IN | WEIGHT: 125 LBS

## 2019-02-11 DIAGNOSIS — M06.9 RHEUMATOID ARTHRITIS INVOLVING BOTH HANDS, UNSPECIFIED RHEUMATOID FACTOR PRESENCE: Primary | ICD-10-CM

## 2019-02-11 DIAGNOSIS — M79.641 BILATERAL HAND PAIN: ICD-10-CM

## 2019-02-11 DIAGNOSIS — M25.552 LEFT HIP PAIN: ICD-10-CM

## 2019-02-11 DIAGNOSIS — M15.9 PRIMARY OSTEOARTHRITIS INVOLVING MULTIPLE JOINTS: ICD-10-CM

## 2019-02-11 DIAGNOSIS — H04.123 DRY EYE SYNDROME OF BOTH EYES: ICD-10-CM

## 2019-02-11 DIAGNOSIS — M79.642 BILATERAL HAND PAIN: ICD-10-CM

## 2019-02-11 PROBLEM — M15.0 PRIMARY OSTEOARTHRITIS INVOLVING MULTIPLE JOINTS: Status: ACTIVE | Noted: 2019-02-11

## 2019-02-11 PROCEDURE — 99215 OFFICE O/P EST HI 40 MIN: CPT | Performed by: INTERNAL MEDICINE

## 2019-02-11 NOTE — PROGRESS NOTES
?  RHEUMATOLOGY Follow up   Date of visit: 2/11/2019  ?   Patient presents with:  Establish Care: test results- Pt states she finished therapy of prednisone-states that when she was on it -felt so great that she wanted to travel to [place], after completing hip x-rays. Patient will follow-up in about 2 months or sooner as needed.   Again just as last time I advised that the patient discuss her antihypertensive as well as statin therapy with her cardiologist if she continues to be interested in stopping this m further questions at this time.     ?  Plan:  Diagnoses and all orders for this visit:    Rheumatoid arthritis involving both hands, unspecified rheumatoid factor presence (HCC)    Bilateral hand pain    Left hip pain    Primary osteoarthritis involving mul inflammatory markers as well as RF positivity. She was on methotrexate as well as celebrex in the past for this, however, she had significant acid reflux as well as weight loss and anemia. States she was on the medications for about about 3-4 months.  Since seizures. No history of prior blood clot in the legs or lungs, strokes or ischemic phenomenon. Denies nonhealing ulcers on the fingertips or trouble swallowing.   The patient denies any history of uveitis, crampy abdominal pain, constipation, diarrhea, bl apply Misc Use 1 Accu Chek Compact Plus lancets daily as needed  Dx R73.9, R73.03 Disp: 100 each Rfl: 1     Modified Medications    No medications on file     There are no discontinued medications.   ?  ?  Allergies:    Penicillins             SHORTNESS OF EOM are normal. Pupils are equal, round, and reactive to light. No scleral icterus. Neck: Normal range of motion. Neck supple. No JVD present. No tracheal deviation present.    Cardiovascular: Normal rate, regular rhythm, normal heart sounds and intact di 5th IP joints 1st through 5th MCP joints and marked joint space narrowing with appearance of bony fusion involving the carpus and carpal metacarpal joint space.   There are lucencies consistent with marginal erosions infarct in the carpus and 2nd 3rd and 4t GLOBULIN 4.1 10/22/2018     10/22/2018    K 4.0 10/22/2018     10/22/2018    CO2 26.0 10/22/2018       Additional Labs:  02/2019  CCP positive (181)  RF negative   AARON positive (no titer provided)   (N<100)   (N<100)  Remaining EN

## 2019-02-18 ENCOUNTER — TELEPHONE (OUTPATIENT)
Dept: FAMILY MEDICINE CLINIC | Facility: CLINIC | Age: 79
End: 2019-02-18

## 2019-02-18 NOTE — TELEPHONE ENCOUNTER
Called pt and lmom asking her to call her pharmacy to find out if her brand of losartan was affected by the recall and if it was affected to call our office back. If it was not affected to continue the medication.   Asked pt after reviewing message to call

## 2019-02-28 VITALS
HEART RATE: 72 BPM | HEIGHT: 60 IN | BODY MASS INDEX: 24.35 KG/M2 | DIASTOLIC BLOOD PRESSURE: 78 MMHG | WEIGHT: 124 LBS | SYSTOLIC BLOOD PRESSURE: 154 MMHG

## 2019-02-28 VITALS
WEIGHT: 122 LBS | HEART RATE: 87 BPM | HEIGHT: 60 IN | SYSTOLIC BLOOD PRESSURE: 150 MMHG | DIASTOLIC BLOOD PRESSURE: 80 MMHG | BODY MASS INDEX: 23.95 KG/M2

## 2019-03-08 ENCOUNTER — OFFICE VISIT (OUTPATIENT)
Dept: FAMILY MEDICINE CLINIC | Facility: CLINIC | Age: 79
End: 2019-03-08
Payer: MEDICARE

## 2019-03-08 VITALS
HEART RATE: 88 BPM | BODY MASS INDEX: 26.24 KG/M2 | WEIGHT: 125 LBS | OXYGEN SATURATION: 98 % | RESPIRATION RATE: 16 BRPM | HEIGHT: 58 IN | DIASTOLIC BLOOD PRESSURE: 70 MMHG | SYSTOLIC BLOOD PRESSURE: 130 MMHG | TEMPERATURE: 98 F

## 2019-03-08 DIAGNOSIS — J10.1 INFLUENZA A: Primary | ICD-10-CM

## 2019-03-08 LAB
POCT INFLUENZA A: POSITIVE
POCT INFLUENZA B: NEGATIVE

## 2019-03-08 PROCEDURE — 87502 INFLUENZA DNA AMP PROBE: CPT | Performed by: PHYSICIAN ASSISTANT

## 2019-03-08 PROCEDURE — 99213 OFFICE O/P EST LOW 20 MIN: CPT | Performed by: PHYSICIAN ASSISTANT

## 2019-03-08 RX ORDER — LOSARTAN POTASSIUM 25 MG/1
TABLET ORAL
Qty: 90 TABLET | Refills: 0 | Status: SHIPPED | OUTPATIENT
Start: 2019-03-08 | End: 2019-04-22

## 2019-03-08 RX ORDER — OSELTAMIVIR PHOSPHATE 75 MG/1
75 CAPSULE ORAL 2 TIMES DAILY
Qty: 10 CAPSULE | Refills: 0 | Status: SHIPPED | OUTPATIENT
Start: 2019-03-08 | End: 2019-03-13

## 2019-03-08 RX ORDER — BENZONATATE 200 MG/1
200 CAPSULE ORAL 3 TIMES DAILY PRN
Qty: 21 CAPSULE | Refills: 0 | Status: SHIPPED | OUTPATIENT
Start: 2019-03-08 | End: 2019-03-15

## 2019-03-08 NOTE — PATIENT INSTRUCTIONS
1. Tamiflu  2. Tessalon for cough  3. Increase fluids/ rest  4. Follow up with PCP  5. If worsening symptoms seek treatment    Influenza (Adult)    Influenza is also called the flu. It is a viral illness that affects the air passages of your lungs.  It is Follow up with your healthcare provider, or as advised, if you are not getting better over the next week. If you are age 72 or older, talk with your provider about getting a pneumococcal vaccine every 5 years.  You should also get this vaccine if you have

## 2019-03-08 NOTE — PROGRESS NOTES
CHIEF COMPLAINT:     Patient presents with:  Cough: fever 100 - started yesterday - hoarse voice      HPI:   Brenda Chatterjee is a 67year old female who presents with complaints of feeling sick for one day. Patient reports at home fever of 100.   She last to • Coronary artery disease     See Dr. Bonner Branch   • Coronary artery disease involving native coronary artery of native heart without angina pectoris 5/16/2018   • Essential hypertension 5/16/2018   • Hyperglycemia 5/16/2018   • Mixed hyperlipidemia 5/16/2 GI: No visible scars, or masses. BS's present x4. No palpable masses or hepatosplenomegaly. Non tender. No guarding or rebound tenderness  EXTREMITIES: no cyanosis, clubbing or edema. Homans NEG. Dorsalis Pedis 2+. LYMPH:  No lymphadenopathy.        PO · Nausea and loss of appetite are common with the flu. Eat light meals. Drink 6 to 8 glasses of liquids every day. Good choices are water, sport drinks, soft drinks without caffeine, juices, tea, and soup.  Extra fluids will also help loosen secretions in y

## 2019-04-12 ENCOUNTER — TELEPHONE (OUTPATIENT)
Dept: FAMILY MEDICINE CLINIC | Facility: CLINIC | Age: 79
End: 2019-04-12

## 2019-04-12 ENCOUNTER — TELEPHONE (OUTPATIENT)
Dept: RHEUMATOLOGY | Facility: CLINIC | Age: 79
End: 2019-04-12

## 2019-04-12 DIAGNOSIS — R76.8 SS-A ANTIBODY POSITIVE: ICD-10-CM

## 2019-04-12 DIAGNOSIS — H04.123 DRY EYE SYNDROME OF BOTH EYES: ICD-10-CM

## 2019-04-12 DIAGNOSIS — M06.9 RHEUMATOID ARTHRITIS FLARE (HCC): Primary | ICD-10-CM

## 2019-04-12 DIAGNOSIS — M06.9 RHEUMATOID ARTHRITIS FLARE (HCC): ICD-10-CM

## 2019-04-12 DIAGNOSIS — E11.9 TYPE 2 DIABETES MELLITUS WITHOUT COMPLICATION, WITHOUT LONG-TERM CURRENT USE OF INSULIN (HCC): Primary | ICD-10-CM

## 2019-04-12 DIAGNOSIS — E78.2 MIXED HYPERLIPIDEMIA: ICD-10-CM

## 2019-04-12 DIAGNOSIS — R76.8 RIBONUCLEOPROTEIN ANTIBODY POSITIVE: ICD-10-CM

## 2019-04-12 RX ORDER — PREDNISONE 20 MG/1
20 TABLET ORAL DAILY
Qty: 14 TABLET | Refills: 0 | Status: SHIPPED | OUTPATIENT
Start: 2019-04-12 | End: 2019-04-22

## 2019-04-12 NOTE — TELEPHONE ENCOUNTER
Wagner made a appt to see Dr Adalid Childers on May 13th, her Rheumatoid Dr put in orders for her to have done, she is just wondering if Dr Adalid Childers would like her to have any bloodwork done before her appt, she would like a call at [phone] to let her know if

## 2019-04-12 NOTE — TELEPHONE ENCOUNTER
Patient called answering service re: increased pain. Patient has been off prednisone and off plaquenil. Had an appt scheduled with Dr. Brigid Hubbard for 4/11/2019, but cancelled per patient to go to the chiropractor.  Dr. Brigid Hubbard will place orders for blood work an

## 2019-04-12 NOTE — TELEPHONE ENCOUNTER
Spoke to patient and she said that she is having labs from Dr. Jacky Snowden done and discussed that her A1C is not due until [facility] so if she can wait until then. Pt verbalized understanding.      After talking with patient due to her insurance she now needs to

## 2019-04-22 ENCOUNTER — OFFICE VISIT (OUTPATIENT)
Dept: RHEUMATOLOGY | Facility: CLINIC | Age: 79
End: 2019-04-22
Payer: MEDICARE

## 2019-04-22 VITALS
HEIGHT: 58 IN | WEIGHT: 118 LBS | DIASTOLIC BLOOD PRESSURE: 88 MMHG | HEART RATE: 80 BPM | SYSTOLIC BLOOD PRESSURE: 164 MMHG | RESPIRATION RATE: 18 BRPM | BODY MASS INDEX: 24.77 KG/M2

## 2019-04-22 DIAGNOSIS — M79.641 BILATERAL HAND PAIN: ICD-10-CM

## 2019-04-22 DIAGNOSIS — M25.512 CHRONIC PAIN OF BOTH SHOULDERS: ICD-10-CM

## 2019-04-22 DIAGNOSIS — M15.9 PRIMARY OSTEOARTHRITIS INVOLVING MULTIPLE JOINTS: ICD-10-CM

## 2019-04-22 DIAGNOSIS — R76.8 SS-A ANTIBODY POSITIVE: ICD-10-CM

## 2019-04-22 DIAGNOSIS — M79.642 BILATERAL HAND PAIN: ICD-10-CM

## 2019-04-22 DIAGNOSIS — R76.8 RIBONUCLEOPROTEIN ANTIBODY POSITIVE: ICD-10-CM

## 2019-04-22 DIAGNOSIS — H04.123 DRY EYE SYNDROME OF BOTH EYES: ICD-10-CM

## 2019-04-22 DIAGNOSIS — M25.561 CHRONIC PAIN OF BOTH KNEES: ICD-10-CM

## 2019-04-22 DIAGNOSIS — G89.29 CHRONIC PAIN OF BOTH KNEES: ICD-10-CM

## 2019-04-22 DIAGNOSIS — M25.562 CHRONIC PAIN OF BOTH KNEES: ICD-10-CM

## 2019-04-22 DIAGNOSIS — M25.511 CHRONIC PAIN OF BOTH SHOULDERS: ICD-10-CM

## 2019-04-22 DIAGNOSIS — G89.29 CHRONIC PAIN OF BOTH SHOULDERS: ICD-10-CM

## 2019-04-22 DIAGNOSIS — M06.9 RHEUMATOID ARTHRITIS INVOLVING BOTH HANDS, UNSPECIFIED RHEUMATOID FACTOR PRESENCE: ICD-10-CM

## 2019-04-22 DIAGNOSIS — M06.9 RHEUMATOID ARTHRITIS FLARE (HCC): Primary | ICD-10-CM

## 2019-04-22 PROCEDURE — 99215 OFFICE O/P EST HI 40 MIN: CPT | Performed by: INTERNAL MEDICINE

## 2019-04-22 RX ORDER — PREDNISONE 1 MG/1
TABLET ORAL
Qty: 30 TABLET | Refills: 0 | Status: SHIPPED | OUTPATIENT
Start: 2019-04-22 | End: 2019-05-13 | Stop reason: ALTCHOICE

## 2019-04-22 RX ORDER — ACETAMINOPHEN 160 MG
2000 TABLET,DISINTEGRATING ORAL DAILY
COMMUNITY

## 2019-04-22 RX ORDER — HYDROXYCHLOROQUINE SULFATE 200 MG/1
200 TABLET, FILM COATED ORAL DAILY
Qty: 90 TABLET | Refills: 0 | Status: SHIPPED | OUTPATIENT
Start: 2019-04-22 | End: 2019-06-27

## 2019-04-22 NOTE — PATIENT INSTRUCTIONS
-- I have ordered physical therapy and occupational therapy for your shoulders, knees and hands bilaterally  -- I recommend you start prednisone for the active inflammation you have on exam today  -- the plaquenil (hydroxycholoroquine) can take 6-8 weeks t

## 2019-04-22 NOTE — PROGRESS NOTES
?  RHEUMATOLOGY Follow up   Date of visit: 4/22/19  ? Patient presents with:  RA: est pt, fu -Pt states that she had to stop Plaqenil and Prednisone after increased joint pain flaire up.  Pt continues to co of pain in her bilateral shoulders, hands and k Plaquenil versus a flare after not having any anti-inflammatory medication on board.     Discussed again side effects from plaquenil.   -Plaquenil is an antimalarial medication used for its anti-inflammatory properties in systemic lupus erythematosus for th reduce the frequency of Cardiovascular Disease Related events.  -Patients require significant monitoring due to the high risk of side effects associated with these medications including malignancy and infection.     Despite stressing the potential life-thre THERAPY & REHAB    Chronic pain of both knees  -     OP REFERRAL TO [facility]    Bilateral hand pain  -     OP REFERRAL TO [facility]    Dry eye syndrome of both eyes    SS-A antibody positive    Primary osteoarthriti anemia. States she was on the medications for about about 3-4 months.  Since that time, she was managing with conservative measures such as diet modification and rest. At that time, she was under a lot of stress with her only son getting  and moving any history of uveitis, crampy abdominal pain, constipation, diarrhea, bloody stools, nodular painful shin bruises, Achilles heel pain or symptoms of enthesitis, psoriatic lesions, or pain awakening the patient from sleep.   There are no symptoms of dry mukund strip Rfl: 1   Losartan Potassium 25 MG Oral Tab Take 25 mg by mouth daily. Disp:  Rfl:    Glucosamine Sulfate 1000 MG Oral Cap Take by mouth daily.    Disp:  Rfl:    Lancets Does not apply Misc Use 1 Accu Chek Compact Plus lancets daily as needed  Dx R73.9 dysuria and urgency. Musculoskeletal: Positive for joint pain, myalgias and neck pain. Negative for back pain. Skin: Negative for itching and rash. Neurological: Negative for dizziness, tingling and headaches.    Psychiatric/Behavioral: Positive for d with provocative maneuvers. bilateral knees without medial joint line tenderness, moderate crepitus, no effusion. Lymphadenopathy:     She has no cervical adenopathy. Neurological: She is alert and oriented to person, place, and time.  No cranial nerve Rheumatology  4/22/2019

## 2019-04-27 ENCOUNTER — HOSPITAL ENCOUNTER (EMERGENCY)
Age: 79
Discharge: HOME | End: 2019-04-27
Attending: EMERGENCY MEDICINE
Payer: MEDICARE

## 2019-04-27 ENCOUNTER — APPOINTMENT (OUTPATIENT)
Dept: LAB | Age: 79
End: 2019-04-27
Attending: FAMILY MEDICINE
Payer: MEDICARE

## 2019-04-27 VITALS
RESPIRATION RATE: 18 BRPM | TEMPERATURE: 98 F | HEART RATE: 100 BPM | DIASTOLIC BLOOD PRESSURE: 90 MMHG | HEIGHT: 60 IN | SYSTOLIC BLOOD PRESSURE: 176 MMHG | WEIGHT: 120 LBS | BODY MASS INDEX: 23.56 KG/M2 | OXYGEN SATURATION: 98 %

## 2019-04-27 DIAGNOSIS — G89.29 OTHER CHRONIC PAIN: Primary | ICD-10-CM

## 2019-04-27 PROCEDURE — 85025 COMPLETE CBC W/AUTO DIFF WBC: CPT | Performed by: FAMILY MEDICINE

## 2019-04-27 PROCEDURE — 80053 COMPREHEN METABOLIC PANEL: CPT | Performed by: FAMILY MEDICINE

## 2019-04-27 PROCEDURE — 99283 EMERGENCY DEPT VISIT LOW MDM: CPT

## 2019-04-27 PROCEDURE — 80061 LIPID PANEL: CPT | Performed by: FAMILY MEDICINE

## 2019-04-27 PROCEDURE — 83036 HEMOGLOBIN GLYCOSYLATED A1C: CPT | Performed by: FAMILY MEDICINE

## 2019-04-27 PROCEDURE — 82043 UR ALBUMIN QUANTITATIVE: CPT | Performed by: FAMILY MEDICINE

## 2019-04-27 PROCEDURE — 86140 C-REACTIVE PROTEIN: CPT | Performed by: FAMILY MEDICINE

## 2019-04-27 PROCEDURE — 85652 RBC SED RATE AUTOMATED: CPT | Performed by: FAMILY MEDICINE

## 2019-04-27 PROCEDURE — 36415 COLL VENOUS BLD VENIPUNCTURE: CPT | Performed by: FAMILY MEDICINE

## 2019-04-27 PROCEDURE — 82570 ASSAY OF URINE CREATININE: CPT | Performed by: FAMILY MEDICINE

## 2019-04-27 RX ORDER — LIDOCAINE 4 G/G
1 PATCH TOPICAL EVERY 24 HOURS
Qty: 14 PATCH | Refills: 0 | Status: SHIPPED | OUTPATIENT
Start: 2019-04-27 | End: 2020-07-13 | Stop reason: ALTCHOICE

## 2019-04-27 RX ORDER — TRAMADOL HYDROCHLORIDE 50 MG/1
TABLET ORAL EVERY 6 HOURS PRN
Qty: 10 TABLET | Refills: 0 | Status: SHIPPED | OUTPATIENT
Start: 2019-04-27 | End: 2019-05-04

## 2019-04-27 NOTE — ED PROVIDER NOTES
Patient Seen in: [facility] [place]    History   Patient presents with:  Pain (neurologic)  Lower Extremity Injury (musculoskeletal)    Stated Complaint: right shoulder/right knee pain x 3 days. worse in the morning.     HPI    This Temporal   SpO2 98 %   O2 Device None (Room air)       Current:BP (!) 176/90   Pulse 100   Temp 98.2 °F (36.8 °C) (Temporal)   Resp 18   Ht 152.4 cm (5')   Wt 54.4 kg   SpO2 98%   BMI 23.44 kg/m²         Physical Exam    Alert and oriented patient appears

## 2019-04-29 ENCOUNTER — OFFICE VISIT (OUTPATIENT)
Dept: PHYSICAL THERAPY | Age: 79
End: 2019-04-29
Attending: INTERNAL MEDICINE
Payer: MEDICARE

## 2019-04-29 DIAGNOSIS — M06.9 RHEUMATOID ARTHRITIS FLARE (HCC): ICD-10-CM

## 2019-04-29 DIAGNOSIS — G89.29 CHRONIC PAIN OF BOTH KNEES: ICD-10-CM

## 2019-04-29 DIAGNOSIS — M25.562 CHRONIC PAIN OF BOTH KNEES: ICD-10-CM

## 2019-04-29 DIAGNOSIS — G89.29 CHRONIC PAIN OF BOTH SHOULDERS: ICD-10-CM

## 2019-04-29 DIAGNOSIS — M25.512 CHRONIC PAIN OF BOTH SHOULDERS: ICD-10-CM

## 2019-04-29 DIAGNOSIS — M25.561 CHRONIC PAIN OF BOTH KNEES: ICD-10-CM

## 2019-04-29 DIAGNOSIS — M25.511 CHRONIC PAIN OF BOTH SHOULDERS: ICD-10-CM

## 2019-04-29 PROCEDURE — 97163 PT EVAL HIGH COMPLEX 45 MIN: CPT | Performed by: PHYSICAL THERAPIST

## 2019-04-29 PROCEDURE — 97530 THERAPEUTIC ACTIVITIES: CPT | Performed by: PHYSICAL THERAPIST

## 2019-04-29 NOTE — PROGRESS NOTES
LOWER EXTREMITY EVALUATION:   Referring Physician: Dr. Samara Cope  Diagnosis: RA Chronic bilat knee pain,bilateral shoulder pain     Date of Service: 4/29/2019     PATIENT Rimma Montesinos is a 67year old y/o female who presents to therapy today wit Transfers from supine to sitting are also difficult with the patient rocking to gain momentum to transfer. She does not have full grasp with right or left UE and has limited shoulder mobility.    Wagner would benefit from skilled Physical Therapy to improve provided on on her condition and recommendations for work on restoring ROM. The patient appeared to have fairly good understanding of the plan. She is most concerned about the right knee but clearly needs work on her overall hip mobility.  There was no incr

## 2019-04-30 ENCOUNTER — TELEPHONE (OUTPATIENT)
Dept: FAMILY MEDICINE CLINIC | Facility: CLINIC | Age: 79
End: 2019-04-30

## 2019-04-30 NOTE — TELEPHONE ENCOUNTER
----- Message from Reagan Gomez DO sent at 4/29/2019  8:14 PM CDT -----  Please call patient and let her know we will discuss her blood test results at her upcoming appointment.   Please forward results via Epic of CRP, CBC, and CMP to rheumatologist

## 2019-05-01 ENCOUNTER — OFFICE VISIT (OUTPATIENT)
Dept: PHYSICAL THERAPY | Age: 79
End: 2019-05-01
Attending: FAMILY MEDICINE
Payer: MEDICARE

## 2019-05-01 PROCEDURE — 97140 MANUAL THERAPY 1/> REGIONS: CPT | Performed by: PHYSICAL THERAPIST

## 2019-05-01 PROCEDURE — 97110 THERAPEUTIC EXERCISES: CPT | Performed by: PHYSICAL THERAPIST

## 2019-05-01 NOTE — PROGRESS NOTES
Dx: RA, chronic bilateral hip and knees         Authorized # of Visits:  8         Next MD visit: none scheduled  Fall Risk: standard         Precautions: n/a             Subjective: The patient states she is having difficulty with sleeping at night.  Her r

## 2019-05-06 ENCOUNTER — OFFICE VISIT (OUTPATIENT)
Dept: PHYSICAL THERAPY | Age: 79
End: 2019-05-06
Attending: FAMILY MEDICINE
Payer: MEDICARE

## 2019-05-06 PROCEDURE — 97110 THERAPEUTIC EXERCISES: CPT | Performed by: PHYSICAL THERAPIST

## 2019-05-06 NOTE — PROGRESS NOTES
Dx: RA, chronic bilateral hip and knees         Authorized # of Visits:  8         Next MD visit: none scheduled  Fall Risk: standard         Precautions: n/a             Subjective: The patient states that she felt good when she left therapy last session.

## 2019-05-08 ENCOUNTER — APPOINTMENT (OUTPATIENT)
Dept: PHYSICAL THERAPY | Age: 79
End: 2019-05-08
Attending: FAMILY MEDICINE
Payer: MEDICARE

## 2019-05-09 ENCOUNTER — APPOINTMENT (OUTPATIENT)
Dept: PHYSICAL THERAPY | Age: 79
End: 2019-05-09
Attending: FAMILY MEDICINE
Payer: MEDICARE

## 2019-05-13 ENCOUNTER — TELEPHONE (OUTPATIENT)
Dept: PHYSICAL THERAPY | Age: 79
End: 2019-05-13

## 2019-05-13 ENCOUNTER — OFFICE VISIT (OUTPATIENT)
Dept: FAMILY MEDICINE CLINIC | Facility: CLINIC | Age: 79
End: 2019-05-13
Payer: MEDICARE

## 2019-05-13 VITALS
BODY MASS INDEX: 24.77 KG/M2 | HEIGHT: 58 IN | DIASTOLIC BLOOD PRESSURE: 72 MMHG | WEIGHT: 118 LBS | SYSTOLIC BLOOD PRESSURE: 128 MMHG | HEART RATE: 95 BPM

## 2019-05-13 DIAGNOSIS — I10 ESSENTIAL HYPERTENSION: ICD-10-CM

## 2019-05-13 DIAGNOSIS — R77.9 ELEVATED SERUM PROTEIN LEVEL: ICD-10-CM

## 2019-05-13 DIAGNOSIS — E11.9 TYPE 2 DIABETES MELLITUS WITHOUT COMPLICATION, WITHOUT LONG-TERM CURRENT USE OF INSULIN (HCC): Primary | ICD-10-CM

## 2019-05-13 DIAGNOSIS — D64.9 ANEMIA, UNSPECIFIED TYPE: ICD-10-CM

## 2019-05-13 PROCEDURE — 99214 OFFICE O/P EST MOD 30 MIN: CPT | Performed by: FAMILY MEDICINE

## 2019-05-13 NOTE — PROGRESS NOTES
Gadiel Enamorado is a 67year old female. HPI:     HTN:    Much improved. Severity is mild, patient is on 1 agent for her hypertension. Patient has been prescribed losartan 25 mg daily by her cardiologist.  Diet: No particular diet being followed. History:   Procedure Laterality Date   •      • COLONOSCOPY  2017   • EGD  2017   • ORAL SURGERY PROCEDURE        Social History:    Social History    Tobacco Use      Smoking status: Never Smoker      Smokeless tobacco: Never Used uL 7.5 5.1    RBC      3.80 - 5.30 x10(6)uL 3.76 (L) 3.70 (L)    Hemoglobin      12.0 - 16.0 g/dL 11.7 (L) 11.9 (L)    Hematocrit      35.0 - 48.0 % 34.7 (L) 36.4    Platelet Count      179.0 - 450.0 10(3)uL 297.0 251.0    MCV      80.0 - 100.0 fL 92.3 98. 4.1   A/G Ratio      1.0 - 2.0 1.0  1.2   Cholesterol, Total      <200 mg/dL 186  168   HDL Cholesterol      40 - 59 mg/dL 70 (H)  78 (H)   Triglycerides      30 - 149 mg/dL 92  100   LDL Cholesterol Calc      <100 mg/dL 98  70   VLDL      0 - 30 mg/dL 18

## 2019-05-14 ENCOUNTER — TELEPHONE (OUTPATIENT)
Dept: RHEUMATOLOGY | Facility: CLINIC | Age: 79
End: 2019-05-14

## 2019-05-14 ENCOUNTER — APPOINTMENT (OUTPATIENT)
Dept: PHYSICAL THERAPY | Age: 79
End: 2019-05-14
Attending: FAMILY MEDICINE
Payer: MEDICARE

## 2019-05-15 ENCOUNTER — TELEPHONE (OUTPATIENT)
Dept: RHEUMATOLOGY | Facility: CLINIC | Age: 79
End: 2019-05-15

## 2019-05-15 NOTE — TELEPHONE ENCOUNTER
Patient called again. States that she had to go to the ER after physical therapy due to worsened pain as well as elevated blood pressure. Since that time she has followed with sports medicine as well as her primary care physician.   Did get cortisone inje

## 2019-05-16 ENCOUNTER — APPOINTMENT (OUTPATIENT)
Dept: PHYSICAL THERAPY | Age: 79
End: 2019-05-16
Attending: FAMILY MEDICINE
Payer: MEDICARE

## 2019-06-17 ENCOUNTER — OFFICE VISIT (OUTPATIENT)
Dept: FAMILY MEDICINE CLINIC | Facility: CLINIC | Age: 79
End: 2019-06-17
Payer: MEDICARE

## 2019-06-17 VITALS
HEIGHT: 58 IN | HEART RATE: 90 BPM | DIASTOLIC BLOOD PRESSURE: 78 MMHG | WEIGHT: 121 LBS | BODY MASS INDEX: 25.4 KG/M2 | SYSTOLIC BLOOD PRESSURE: 130 MMHG

## 2019-06-17 DIAGNOSIS — Z78.0 POSTMENOPAUSAL: ICD-10-CM

## 2019-06-17 DIAGNOSIS — E78.2 MIXED HYPERLIPIDEMIA: ICD-10-CM

## 2019-06-17 DIAGNOSIS — M79.642 BILATERAL HAND PAIN: ICD-10-CM

## 2019-06-17 DIAGNOSIS — M06.9 RHEUMATOID ARTHRITIS INVOLVING MULTIPLE SITES, UNSPECIFIED RHEUMATOID FACTOR PRESENCE: ICD-10-CM

## 2019-06-17 DIAGNOSIS — M79.641 BILATERAL HAND PAIN: ICD-10-CM

## 2019-06-17 DIAGNOSIS — Z12.31 ENCOUNTER FOR SCREENING MAMMOGRAM FOR MALIGNANT NEOPLASM OF BREAST: ICD-10-CM

## 2019-06-17 DIAGNOSIS — E11.9 TYPE 2 DIABETES MELLITUS WITHOUT COMPLICATION, WITHOUT LONG-TERM CURRENT USE OF INSULIN (HCC): ICD-10-CM

## 2019-06-17 DIAGNOSIS — Z28.21 INFLUENZA VACCINATION DECLINED BY PATIENT: ICD-10-CM

## 2019-06-17 DIAGNOSIS — M06.9 RHEUMATOID ARTHRITIS INVOLVING BOTH HANDS, UNSPECIFIED RHEUMATOID FACTOR PRESENCE: ICD-10-CM

## 2019-06-17 DIAGNOSIS — D64.9 ANEMIA, UNSPECIFIED TYPE: ICD-10-CM

## 2019-06-17 DIAGNOSIS — I77.9 ARTERIAL DISEASE (HCC): ICD-10-CM

## 2019-06-17 DIAGNOSIS — Z00.00 MEDICARE ANNUAL WELLNESS VISIT, SUBSEQUENT: Primary | ICD-10-CM

## 2019-06-17 DIAGNOSIS — I10 ESSENTIAL HYPERTENSION: ICD-10-CM

## 2019-06-17 DIAGNOSIS — I25.10 CORONARY ARTERY DISEASE INVOLVING NATIVE CORONARY ARTERY OF NATIVE HEART WITHOUT ANGINA PECTORIS: ICD-10-CM

## 2019-06-17 DIAGNOSIS — R77.9 ELEVATED SERUM PROTEIN LEVEL: ICD-10-CM

## 2019-06-17 DIAGNOSIS — M15.9 PRIMARY OSTEOARTHRITIS INVOLVING MULTIPLE JOINTS: ICD-10-CM

## 2019-06-17 DIAGNOSIS — Z23 NEED FOR VACCINATION: ICD-10-CM

## 2019-06-17 DIAGNOSIS — M25.552 LEFT HIP PAIN: ICD-10-CM

## 2019-06-17 PROBLEM — R21 RASH: Status: RESOLVED | Noted: 2018-07-18 | Resolved: 2019-06-17

## 2019-06-17 PROCEDURE — 99397 PER PM REEVAL EST PAT 65+ YR: CPT | Performed by: FAMILY MEDICINE

## 2019-06-17 PROCEDURE — G0009 ADMIN PNEUMOCOCCAL VACCINE: HCPCS | Performed by: FAMILY MEDICINE

## 2019-06-17 PROCEDURE — 90732 PPSV23 VACC 2 YRS+ SUBQ/IM: CPT | Performed by: FAMILY MEDICINE

## 2019-06-17 PROCEDURE — 96160 PT-FOCUSED HLTH RISK ASSMT: CPT | Performed by: FAMILY MEDICINE

## 2019-06-17 PROCEDURE — G0439 PPPS, SUBSEQ VISIT: HCPCS | Performed by: FAMILY MEDICINE

## 2019-06-17 RX ORDER — LOSARTAN POTASSIUM 25 MG/1
25 TABLET ORAL DAILY
Qty: 90 TABLET | Refills: 3 | Status: SHIPPED | OUTPATIENT
Start: 2019-06-17 | End: 2019-11-21

## 2019-06-17 NOTE — PROGRESS NOTES
HPI:   Orestes Hunter is a 67year old female who presents for a MA (Medicare Advantage) Supervisit (Once per calendar year).              Fall/Risk Assessment   She has been screened for Falls and is low risk: Fall/Risk Scorin      Cognitive Assessmen therapy For the following reasons:   Patient decided that the risks of aspirin therapy outweigh the benefits and declines aspirin therapy        CAGE Alcohol screening   Nav Jessica was screened for Alcohol abuse and had a score of 0 so is at low risk. Devorah Chahal, DO:  Losartan Potassium 25 MG Oral Tab Take 1 tablet (25 mg total) by mouth daily. Vitamin D3 2000 units Oral Cap Take 2,000 Units by mouth daily. Glucose Blood (ACCU-CHEK COMPACT PLUS) In Vitro Strip Use 1 strip twice a day.   E11.9   Glucosam kg/m²  Estimated body mass index is 25.29 kg/m² as calculated from the following:    Height as of this encounter: 58\". Weight as of this encounter: 121 lb.     Medicare Hearing Assessment  (Required for AWV/SWV)    Finger Rub       Visual Acuity (hcc)  Rheumatoid arthritis involving both hands, unspecified rheumatoid factor presence (hcc)  Rheumatoid arthritis involving multiple sites, unspecified rheumatoid factor presence (hcc)  Bilateral hand pain  Left hip pain  Primary osteoarthritis involvin therapy. Plaquenil as prescribed by rheumatologist.    12. Postmenopausal  Patient counseled on calcium and vitamin D intake as well as regular weightbearing exercises to help prevent osteoporosis.     13. Anemia, unspecified type  Patient encouraged to co of chart, separate sheet to patient  [address] Internal Lab or Procedure External Lab or Procedure   Diabetes Screening      HbgA1C   Annually Lab Results   Component Value Date    A1C 6.2 (H) 04/27/2019    Ivon holland applicable)     Influenza  Covered Annually  Please get every year    Pneumococcal 13 (Prevnar)  Covered Once after 65 05/16/2018 Please get once after your 65th birthday    Pneumococcal 23 (Pneumovax)  Covered Once after 65 No vaccine history found Please 09/28/2012 118   09/28/2012 118    No flowsheet data found. Dilated Eye exam  Annually Data entered on: 11/21/2018   Last Dilated Eye Exam 11/21/2018     No flowsheet data found.             Template: ROMELIA LEMONS MEDICARE ANNUAL ASSESSMENT FEMALE [[id number]]

## 2019-06-17 NOTE — PATIENT INSTRUCTIONS
Please do the outstanding blood tests at your earliest convenience. Recommended Websites for Advanced Directives    [email or web address]  An information packet, including necessary form from the

## 2019-06-19 ENCOUNTER — TELEPHONE (OUTPATIENT)
Dept: RHEUMATOLOGY | Facility: CLINIC | Age: 79
End: 2019-06-19

## 2019-06-19 DIAGNOSIS — M79.642 BILATERAL HAND PAIN: ICD-10-CM

## 2019-06-19 DIAGNOSIS — M79.641 BILATERAL HAND PAIN: ICD-10-CM

## 2019-06-19 DIAGNOSIS — M06.00 RHEUMATOID ARTHRITIS WITH NEGATIVE RHEUMATOID FACTOR, INVOLVING UNSPECIFIED SITE (HCC): Primary | ICD-10-CM

## 2019-06-19 NOTE — TELEPHONE ENCOUNTER
Patient called to report that she is going to water therapy. She would like to have OT for her hands. Having severe hand pain. Patient has d/c's plaquenil.     Your appointments     Date & Time Appointment Department [facility])    Jun 27, 2019 12:30 PM CDT Ex

## 2019-06-19 NOTE — TELEPHONE ENCOUNTER
Pt called. Pt requesting to speak to an RN. Pt states 'it's complicated so would rather just talk with the nurse. Would like an updated physical therapy order for both hands.  Would like to explain in detail to the nurse.' Pt aware office is currently in pt

## 2019-06-25 ENCOUNTER — TELEPHONE (OUTPATIENT)
Dept: FAMILY MEDICINE CLINIC | Facility: CLINIC | Age: 79
End: 2019-06-25

## 2019-06-25 DIAGNOSIS — E11.9 TYPE 2 DIABETES MELLITUS WITHOUT COMPLICATION, WITHOUT LONG-TERM CURRENT USE OF INSULIN (HCC): Primary | ICD-10-CM

## 2019-06-25 DIAGNOSIS — D50.9 IRON DEFICIENCY ANEMIA, UNSPECIFIED IRON DEFICIENCY ANEMIA TYPE: ICD-10-CM

## 2019-06-25 NOTE — TELEPHONE ENCOUNTER
----- Message from Glo Toledo DO sent at 6/21/2019  4:51 PM CDT -----  Please call patient: Patient has diet-controlled diabetes and A1c is less than 7 and therefore her diabetes is considered controlled.   However, patient was to have waited until [facility]

## 2019-06-25 NOTE — TELEPHONE ENCOUNTER
Please call patient:  Iron is low consistent with iron deficiency anemia. Upon review of her chart I am unable to find a colonoscopy report. Please ask patient when her last colonoscopy was and who it was with so that we may obtain a record.   Alternative

## 2019-06-25 NOTE — TELEPHONE ENCOUNTER
Spoke with patient and informed her of results and that we would recheck A1c in 6 months. Pt states that she had other labs done at the same time and would like to know if those are normal as well. Please advise as they were not mentioned in note.

## 2019-06-25 NOTE — TELEPHONE ENCOUNTER
Spoke to patient and she said she had a colon[place] [facility] [address] [address] [place] from a provider that comes from [place] that comes twice a week to that location.   Pt states she had this done just about couple years ago and thinks she may have a

## 2019-06-27 ENCOUNTER — OFFICE VISIT (OUTPATIENT)
Dept: RHEUMATOLOGY | Facility: CLINIC | Age: 79
End: 2019-06-27
Payer: MEDICARE

## 2019-06-27 VITALS
HEIGHT: 58 IN | DIASTOLIC BLOOD PRESSURE: 70 MMHG | HEART RATE: 78 BPM | WEIGHT: 121 LBS | RESPIRATION RATE: 20 BRPM | BODY MASS INDEX: 25.4 KG/M2 | SYSTOLIC BLOOD PRESSURE: 154 MMHG | TEMPERATURE: 98 F

## 2019-06-27 DIAGNOSIS — Z79.899 HIGH RISK MEDICATION USE: ICD-10-CM

## 2019-06-27 DIAGNOSIS — M25.562 CHRONIC PAIN OF BOTH KNEES: ICD-10-CM

## 2019-06-27 DIAGNOSIS — R76.8 SS-A ANTIBODY POSITIVE: ICD-10-CM

## 2019-06-27 DIAGNOSIS — M15.9 PRIMARY OSTEOARTHRITIS INVOLVING MULTIPLE JOINTS: ICD-10-CM

## 2019-06-27 DIAGNOSIS — M79.641 BILATERAL HAND PAIN: ICD-10-CM

## 2019-06-27 DIAGNOSIS — M79.642 BILATERAL HAND PAIN: ICD-10-CM

## 2019-06-27 DIAGNOSIS — G89.29 CHRONIC PAIN OF BOTH KNEES: ICD-10-CM

## 2019-06-27 DIAGNOSIS — M06.9 RHEUMATOID ARTHRITIS FLARE (HCC): ICD-10-CM

## 2019-06-27 DIAGNOSIS — H04.123 DRY EYE SYNDROME OF BOTH EYES: ICD-10-CM

## 2019-06-27 DIAGNOSIS — M06.9 RHEUMATOID ARTHRITIS INVOLVING BOTH HANDS, UNSPECIFIED RHEUMATOID FACTOR PRESENCE: Primary | ICD-10-CM

## 2019-06-27 DIAGNOSIS — M25.561 CHRONIC PAIN OF BOTH KNEES: ICD-10-CM

## 2019-06-27 PROCEDURE — 99215 OFFICE O/P EST HI 40 MIN: CPT | Performed by: INTERNAL MEDICINE

## 2019-06-27 RX ORDER — COVID-19 ANTIGEN TEST
220 KIT MISCELLANEOUS 2 TIMES DAILY PRN
COMMUNITY
End: 2020-07-13 | Stop reason: ALTCHOICE

## 2019-06-27 NOTE — TELEPHONE ENCOUNTER
Spoke to patient and explained that we do not have a copy of her colonoscopy report.   Pt states she has a copy at home and she will bring in a copy to the office for the chart next time she is out this way

## 2019-06-27 NOTE — PROGRESS NOTES
?  RHEUMATOLOGY Follow up   Date of visit:6/27/2019  ?   Patient presents with:  Rheumatoid Arthritis: 2 month f/u, Rapid 3 High Severity, right knee cortisone injection per orth helped for 1 month, but pain has returned, started on iron and Vit C for iro PsA, which can cause pain, swelling, and stiffness. The most common side effects of tofacitinib are upper respiratory tract infections, headache, diarrhea, and nasopharyngitis.  Tofacitinib has been associated with increased cholesterol levels in some patie recently, aggressive control of Rheumatoid Arthritis has been demonstrated to actually reduce the frequency of Cardiovascular Disease Related events.  -Patients require significant monitoring due to the high risk of side effects associated with these medic joint pain and muscle pain. She has been suffering from intermittent flares requiring prednisone. We tried her on plaquenil, however pt did not like how it made her feel. Since her last visit,  She had significant pain in her right knee.  Underwent kne over the past few weeks, states she was started on losartan and atorvastatin. Has had elevations in her cholesterol in the past, was started on a different cholesterol medication which was not effective.  Has plans for nuclear stress test due to hypertensio 5/16/2018   • Coronary artery disease     See Dr. Pau Shanks   • Coronary artery disease involving native coronary artery of native heart without angina pectoris 5/16/2018   • Essential hypertension 5/16/2018   • Hyperglycemia 5/16/2018   • Mixed hyperlipi MYALGIA  Mold                    Coughing  ? REVIEW OF SYSTEMS   ? Review of Systems   Constitutional: Positive for malaise/fatigue. Negative for chills, fever and weight loss. HENT: Negative for ear pain, hearing loss and tinnitus.     Eyes: Negative f distress. She has no wheezes. Abdominal: Soft. She exhibits no distension. Musculoskeletal: She exhibits edema, tenderness and deformity. Right MCPs 1-3 tender, swollen and warm.    Left MCP 1 tender and swollen  [place] neck deformities noted of right m CA 9.4 04/27/2019    OSMOCALC 283 04/27/2019    ALKPHO 83 04/27/2019    AST 12 (L) 04/27/2019    ALT 15 04/27/2019    BILT 0.5 04/27/2019    TP 7.5 06/20/2019    ALB 4.4 04/27/2019    GLOBULIN 4.3 04/27/2019     (L) 04/27/2019    K 3.9 04/27/2019

## 2019-07-01 ENCOUNTER — TELEPHONE (OUTPATIENT)
Dept: RHEUMATOLOGY | Facility: CLINIC | Age: 79
End: 2019-07-01

## 2019-07-01 NOTE — TELEPHONE ENCOUNTER
Returned pt's call. Pt had questions about her steroid dosing. Does feel improvement since starting last week. Is down to 3 tabs daily. Instructed pt to take 2 tab (10mg) daily for next 4 days then decrease to one tablet until prescription runs out.  Was

## 2019-07-03 LAB
MITOGEN-NIL: >10 IU/ML
NIL: 0.03 IU/ML
QUANTIFERON(R)-TB GOLD PLUS, 1 TUBE: NEGATIVE
SIGNAL TO CUT-OFF: 0.02
TB1-NIL: 0 IU/ML
TB2-NIL: 0 IU/ML

## 2019-07-18 ENCOUNTER — OFFICE VISIT (OUTPATIENT)
Dept: PODIATRY CLINIC | Facility: CLINIC | Age: 79
End: 2019-07-18
Payer: COMMERCIAL

## 2019-07-18 DIAGNOSIS — B35.1 ONYCHOMYCOSIS: Primary | ICD-10-CM

## 2019-07-18 PROCEDURE — 99203 OFFICE O/P NEW LOW 30 MIN: CPT | Performed by: PODIATRIST

## 2019-07-20 NOTE — PROGRESS NOTES
Deangelo Rachel is a 67year old female. Patient presents with:  Consult: Type 2 diabetic-this AM -last A1C 6.7- drop a can of peach about 2 years ago and nail grew out thicker.         HPI:   This patient presents to the clinic with a chief complaint o Past Surgical History:   Procedure Laterality Date   •      • COLONOSCOPY  2017   • EGD  2017   • ORAL SURGERY PROCEDURE        History reviewed. No pertinent family history.    Social History    Socioeconomic History      Marital ambulatory. ASSESSMENT AND PLAN:   Diagnoses and all orders for this visit:    Onychomycosis  -     FUNGUS DERMATOPHYTE SKIN, HAIR, NAIL CULTURE; Future        Plan:  Today I reviewed diabetic foot exam diabetic foot care and at this visit we took the na

## 2019-07-31 ENCOUNTER — TELEPHONE (OUTPATIENT)
Dept: RHEUMATOLOGY | Facility: CLINIC | Age: 79
End: 2019-07-31

## 2019-07-31 NOTE — TELEPHONE ENCOUNTER
Pt called. Pt states 'is out of Yanet Oliva, it's working well. Would like to know if it would will have any side effects from being off of the medication for a few days?      Pt [phone]

## 2019-08-01 ENCOUNTER — TELEPHONE (OUTPATIENT)
Dept: PODIATRY CLINIC | Facility: CLINIC | Age: 79
End: 2019-08-01

## 2019-08-01 DIAGNOSIS — B35.1 ONYCHOMYCOSIS: Primary | ICD-10-CM

## 2019-08-01 NOTE — TELEPHONE ENCOUNTER
----- Message from Collette Chambers DPM sent at 7/31/2019  1:00 PM CDT -----  Results reviewed. Please inform patient that fungal culture results are positive and we should proceed with Lamisil tablet therapy.   If the patient agrees we have to do a hepa

## 2019-08-01 NOTE — TELEPHONE ENCOUNTER
LMTCB on pt's only # listed informing that [facility] got her test results and has instructions for pt. Asking pt to call back for [facility] IR[facility] instructions.

## 2019-08-02 ENCOUNTER — TELEPHONE (OUTPATIENT)
Dept: RHEUMATOLOGY | Facility: CLINIC | Age: 79
End: 2019-08-02

## 2019-08-02 NOTE — TELEPHONE ENCOUNTER
Pt is refusing lamisil therapy. Friends told her that she should try to soak it in vinegar first before she tries anything else. Pt would like to not take any pills before she tries alternative therapies.  She believes the fungus is on that nail d/t droppin

## 2019-08-08 ENCOUNTER — TELEPHONE (OUTPATIENT)
Dept: ORTHOPEDICS CLINIC | Facility: CLINIC | Age: 79
End: 2019-08-08

## 2019-08-08 ENCOUNTER — TELEPHONE (OUTPATIENT)
Dept: RHEUMATOLOGY | Facility: CLINIC | Age: 79
End: 2019-08-08

## 2019-08-08 NOTE — TELEPHONE ENCOUNTER
----- Message from Miguelangel Daniels DPM sent at 8/8/2019  9:32 AM CDT -----  Results reviewed. Please inform patient that fungal culture results are positive and we should proceed with Lamisil tablet therapy.   If the patient agrees we have to do a hepat

## 2019-08-08 NOTE — TELEPHONE ENCOUNTER
BUDDY, spoke with patient. Verified name and . Informed patient of test results, medication and the need to have a liver function panel before medication is ordered.      Patient verbalizes understanding and states to please inform Dr. Irina Smith that she wis

## 2019-08-14 ENCOUNTER — TELEPHONE (OUTPATIENT)
Dept: RHEUMATOLOGY | Facility: CLINIC | Age: 79
End: 2019-08-14

## 2019-08-15 NOTE — TELEPHONE ENCOUNTER
Patient given the number for [email or web address] and will call to get free product. Patient will call for samples until she has received delivery.

## 2019-08-16 ENCOUNTER — TELEPHONE (OUTPATIENT)
Dept: CARDIOLOGY | Age: 79
End: 2019-08-16

## 2019-09-03 ENCOUNTER — TELEPHONE (OUTPATIENT)
Dept: RHEUMATOLOGY | Facility: CLINIC | Age: 79
End: 2019-09-03

## 2019-09-05 ENCOUNTER — TELEPHONE (OUTPATIENT)
Dept: FAMILY MEDICINE CLINIC | Facility: CLINIC | Age: 79
End: 2019-09-05

## 2019-09-05 DIAGNOSIS — H53.9 VISION CHANGES: Primary | ICD-10-CM

## 2019-09-05 DIAGNOSIS — E11.9 TYPE 2 DIABETES MELLITUS WITHOUT COMPLICATION, WITHOUT LONG-TERM CURRENT USE OF INSULIN (HCC): ICD-10-CM

## 2019-09-05 NOTE — TELEPHONE ENCOUNTER
Phoned patient. She said she wants to see a different ophthalmologist.  Dr. Devorah Singh. She says she has some vision concerns and would like to have an exam.  States she has puffy eyes and thinks she has cataracts. States she is moving back to [place].

## 2019-09-05 NOTE — TELEPHONE ENCOUNTER
Patient is having some eye concerns and is wondering if this can be due to her DM and if so who should she see, Dr. Yulia Kent or eye specialist?

## 2019-09-06 NOTE — TELEPHONE ENCOUNTER
Zulema for referral as long as Dr. Jazz Abdi is in network. FYI, If she is having changes in vision she does not have to wait 12 months to see Dr. Liliane Barrera.

## 2019-09-16 ENCOUNTER — TELEPHONE (OUTPATIENT)
Dept: FAMILY MEDICINE CLINIC | Facility: CLINIC | Age: 79
End: 2019-09-16

## 2019-09-16 NOTE — TELEPHONE ENCOUNTER
Wagner is calling regarding her lab orders from Dr Guillory Prior,  She still has to go and have them done, she has a couple of orders from her Cardiologist that she would like to add on, she would like a call from the nurse, please call Lien Mcmanus at [phone]

## 2019-09-16 NOTE — TELEPHONE ENCOUNTER
Phoned patient. She will have labs completed that are due. Then she will call back and request copies to take to cardiology. She stated the office can not view the records.

## 2019-09-19 ENCOUNTER — HOSPITAL ENCOUNTER (OUTPATIENT)
Dept: MAMMOGRAPHY | Age: 79
Discharge: HOME | End: 2019-09-19
Attending: FAMILY MEDICINE
Payer: MEDICARE

## 2019-09-19 DIAGNOSIS — Z12.31 ENCOUNTER FOR SCREENING MAMMOGRAM FOR MALIGNANT NEOPLASM OF BREAST: ICD-10-CM

## 2019-09-19 PROCEDURE — 77067 SCR MAMMO BI INCL CAD: CPT | Performed by: FAMILY MEDICINE

## 2019-09-19 PROCEDURE — 77063 BREAST TOMOSYNTHESIS BI: CPT | Performed by: FAMILY MEDICINE

## 2019-10-07 ENCOUNTER — OFFICE VISIT (OUTPATIENT)
Dept: CARDIOLOGY | Age: 79
End: 2019-10-07

## 2019-10-07 VITALS
WEIGHT: 123 LBS | HEART RATE: 84 BPM | HEIGHT: 60 IN | SYSTOLIC BLOOD PRESSURE: 160 MMHG | DIASTOLIC BLOOD PRESSURE: 82 MMHG | BODY MASS INDEX: 24.15 KG/M2

## 2019-10-07 DIAGNOSIS — I25.10 CORONARY ARTERY CALCIFICATION SEEN ON CT SCAN: Primary | ICD-10-CM

## 2019-10-07 DIAGNOSIS — I25.10 CORONARY ARTERY DISEASE INVOLVING NATIVE CORONARY ARTERY OF NATIVE HEART WITHOUT ANGINA PECTORIS: ICD-10-CM

## 2019-10-07 DIAGNOSIS — E11.9 DIABETES MELLITUS TYPE 2 IN NONOBESE (CMD): ICD-10-CM

## 2019-10-07 DIAGNOSIS — E78.00 PURE HYPERCHOLESTEROLEMIA: ICD-10-CM

## 2019-10-07 PROCEDURE — 99214 OFFICE O/P EST MOD 30 MIN: CPT | Performed by: INTERNAL MEDICINE

## 2019-10-07 RX ORDER — GLIPIZIDE 2.5 MG/1
2.5 TABLET, EXTENDED RELEASE ORAL DAILY
COMMUNITY
Start: 2017-05-18 | End: 2019-10-07 | Stop reason: ALTCHOICE

## 2019-10-07 RX ORDER — ACETAMINOPHEN 160 MG
2000 TABLET,DISINTEGRATING ORAL
COMMUNITY

## 2019-10-07 RX ORDER — LIDOCAINE 4 G/G
1 PATCH TOPICAL
COMMUNITY
Start: 2019-04-27 | End: 2019-10-07 | Stop reason: ALTCHOICE

## 2019-10-07 RX ORDER — LOSARTAN POTASSIUM 25 MG/1
25 TABLET ORAL
COMMUNITY
Start: 2017-05-18 | End: 2019-10-07 | Stop reason: SDUPTHER

## 2019-10-07 RX ORDER — ATORVASTATIN CALCIUM 10 MG/1
10 TABLET, FILM COATED ORAL
COMMUNITY
Start: 2018-10-29 | End: 2019-10-07 | Stop reason: ALTCHOICE

## 2019-10-07 RX ORDER — COVID-19 ANTIGEN TEST
220 KIT MISCELLANEOUS
COMMUNITY
End: 2019-10-07 | Stop reason: ALTCHOICE

## 2019-10-07 RX ORDER — ROSUVASTATIN CALCIUM 10 MG/1
10 TABLET, COATED ORAL
Qty: 30 TABLET | Refills: 3 | Status: SHIPPED | OUTPATIENT
Start: 2019-10-07 | End: 2021-01-13 | Stop reason: SDUPTHER

## 2019-10-07 RX ORDER — PETROLATUM,WHITE/LANOLIN
OINTMENT (GRAM) TOPICAL
COMMUNITY
End: 2019-10-07 | Stop reason: ALTCHOICE

## 2019-10-07 RX ORDER — LOSARTAN POTASSIUM 25 MG/1
25 TABLET ORAL DAILY
Qty: 90 TABLET | Refills: 3 | Status: SHIPPED | OUTPATIENT
Start: 2019-10-07 | End: 2021-11-19 | Stop reason: DRUGHIGH

## 2019-10-07 RX ORDER — LANCETS 30 GAUGE
EACH MISCELLANEOUS
COMMUNITY
Start: 2018-07-11

## 2019-10-07 ASSESSMENT — PATIENT HEALTH QUESTIONNAIRE - PHQ9
1. LITTLE INTEREST OR PLEASURE IN DOING THINGS: NOT AT ALL
2. FEELING DOWN, DEPRESSED OR HOPELESS: NOT AT ALL
SUM OF ALL RESPONSES TO PHQ9 QUESTIONS 1 AND 2: 0
SUM OF ALL RESPONSES TO PHQ9 QUESTIONS 1 AND 2: 0

## 2019-10-07 ASSESSMENT — ENCOUNTER SYMPTOMS
CHILLS: 0
COUGH: 0
FEVER: 0
HEMOPTYSIS: 0
SUSPICIOUS LESIONS: 0
WEIGHT LOSS: 0
HEMATOCHEZIA: 0
ALLERGIC/IMMUNOLOGIC COMMENTS: NO NEW FOOD ALLERGIES
WEIGHT GAIN: 0
BRUISES/BLEEDS EASILY: 0

## 2019-10-16 ENCOUNTER — OFFICE VISIT (OUTPATIENT)
Dept: FAMILY MEDICINE CLINIC | Facility: CLINIC | Age: 79
End: 2019-10-16
Payer: MEDICARE

## 2019-10-16 VITALS
TEMPERATURE: 99 F | DIASTOLIC BLOOD PRESSURE: 96 MMHG | HEART RATE: 76 BPM | WEIGHT: 124 LBS | BODY MASS INDEX: 26.03 KG/M2 | HEIGHT: 58 IN | SYSTOLIC BLOOD PRESSURE: 168 MMHG

## 2019-10-16 DIAGNOSIS — D50.8 IRON DEFICIENCY ANEMIA SECONDARY TO INADEQUATE DIETARY IRON INTAKE: ICD-10-CM

## 2019-10-16 DIAGNOSIS — Z28.09 INFLUENZA VACCINATION CONTRAINDICATED: ICD-10-CM

## 2019-10-16 DIAGNOSIS — E11.9 TYPE 2 DIABETES MELLITUS WITHOUT COMPLICATION, WITHOUT LONG-TERM CURRENT USE OF INSULIN (HCC): Primary | ICD-10-CM

## 2019-10-16 PROBLEM — H52.223 REGULAR ASTIGMATISM, BILATERAL: Status: ACTIVE | Noted: 2019-10-16

## 2019-10-16 PROBLEM — E78.00 PURE HYPERCHOLESTEROLEMIA: Status: ACTIVE | Noted: 2018-04-16

## 2019-10-16 PROBLEM — I25.10 CORONARY ARTERY CALCIFICATION SEEN ON CT SCAN: Status: ACTIVE | Noted: 2018-04-27

## 2019-10-16 PROBLEM — H25.013 CORTICAL AGE-RELATED CATARACT, BILATERAL: Status: ACTIVE | Noted: 2019-10-16

## 2019-10-16 PROBLEM — H52.4 PRESBYOPIA: Status: ACTIVE | Noted: 2019-10-16

## 2019-10-16 PROBLEM — H02.204 LAGOPHTHALMOS OF LEFT UPPER EYELID: Status: ACTIVE | Noted: 2019-10-16

## 2019-10-16 PROCEDURE — 99214 OFFICE O/P EST MOD 30 MIN: CPT | Performed by: FAMILY MEDICINE

## 2019-10-16 RX ORDER — ACETAMINOPHEN 160 MG
2000 TABLET,DISINTEGRATING ORAL DAILY
COMMUNITY
End: 2019-10-16

## 2019-10-16 RX ORDER — ROSUVASTATIN CALCIUM 10 MG/1
10 TABLET, COATED ORAL
COMMUNITY
Start: 2019-10-07 | End: 2020-08-17 | Stop reason: ALTCHOICE

## 2019-10-16 RX ORDER — LOSARTAN POTASSIUM 25 MG/1
1 TABLET ORAL 2 TIMES DAILY
COMMUNITY
Start: 2019-10-07 | End: 2019-11-21

## 2019-10-16 NOTE — PROGRESS NOTES
HPI:   Robert Luna is a 67year old female    Type 2 diabetes:  Stable. Diet controlled. Patient denies any known complications to her diabetes.     Iron deficiency anemia:  Patient has been taking iron with vitamin C with a meal twice a day as instruct Coronary artery disease involving native coronary artery of native heart without angina pectoris 5/16/2018   • Essential hypertension 5/16/2018   • Hyperglycemia 5/16/2018   • Mixed hyperlipidemia 5/16/2018   • Postmenopausal 5/16/2018   • Prediabetes 5/16 legs/feet is normal as well.   PSYCH:  Affect normal, normal rate of speech      DATA:      Component      Latest Ref Rng & Units 10/14/2019 6/20/2019 4/27/2019   IRON, TOTAL      45 - 160 mcg/dL 135 37 (L)    IRON BINDING CAPACITY      250 - 450 mcg/dL (ca

## 2019-11-21 ENCOUNTER — TELEPHONE (OUTPATIENT)
Dept: FAMILY MEDICINE CLINIC | Facility: CLINIC | Age: 79
End: 2019-11-21

## 2019-11-21 DIAGNOSIS — I10 ESSENTIAL HYPERTENSION: ICD-10-CM

## 2019-11-21 DIAGNOSIS — I25.10 CORONARY ARTERY DISEASE INVOLVING NATIVE CORONARY ARTERY OF NATIVE HEART WITHOUT ANGINA PECTORIS: Primary | ICD-10-CM

## 2019-11-21 RX ORDER — LOSARTAN POTASSIUM 25 MG/1
25 TABLET ORAL 2 TIMES DAILY
Qty: 180 TABLET | Refills: 1 | Status: SHIPPED | OUTPATIENT
Start: 2019-11-21 | End: 2020-07-13 | Stop reason: DRUGHIGH

## 2019-11-21 NOTE — TELEPHONE ENCOUNTER
Spoke with patient and confirmed Losartan dose. States she is taking Losartan 25mg BID as instructed by Dr. Leyda Nathan.    Requesting refills come from Dr. Juan florentino and sent to [facility]

## 2019-11-21 NOTE — TELEPHONE ENCOUNTER
Pt called and said Soha has 2 different doses of Losartan listed for her. Pt needs the nurse to call Soha @ [phone] and let them know if she is suppose to take 1 or 2 tablets.

## 2019-12-23 ENCOUNTER — TELEPHONE (OUTPATIENT)
Dept: RHEUMATOLOGY | Facility: CLINIC | Age: 79
End: 2019-12-23

## 2019-12-23 DIAGNOSIS — H04.123 DRY EYE SYNDROME OF BOTH EYES: ICD-10-CM

## 2019-12-23 DIAGNOSIS — Z79.899 HIGH RISK MEDICATION USE: ICD-10-CM

## 2019-12-23 DIAGNOSIS — M06.9 RHEUMATOID ARTHRITIS INVOLVING BOTH HANDS, UNSPECIFIED RHEUMATOID FACTOR PRESENCE: Primary | ICD-10-CM

## 2020-01-14 ENCOUNTER — TELEPHONE (OUTPATIENT)
Dept: FAMILY MEDICINE CLINIC | Facility: CLINIC | Age: 80
End: 2020-01-14

## 2020-01-14 DIAGNOSIS — E11.9 TYPE 2 DIABETES MELLITUS WITHOUT COMPLICATION, WITHOUT LONG-TERM CURRENT USE OF INSULIN (HCC): Primary | ICD-10-CM

## 2020-01-14 RX ORDER — BLOOD SUGAR DIAGNOSTIC
STRIP MISCELLANEOUS
Qty: 100 STRIP | Refills: 3 | Status: SHIPPED | OUTPATIENT
Start: 2020-01-14 | End: 2020-01-23

## 2020-01-14 RX ORDER — LANCETS
1 EACH MISCELLANEOUS DAILY
Qty: 100 EACH | Refills: 3 | Status: SHIPPED | OUTPATIENT
Start: 2020-01-14 | End: 2021-01-13

## 2020-01-14 NOTE — TELEPHONE ENCOUNTER
Fax received from [employer] requesting refills on diabetic testing supplies. Patient previously used [facility]. Refills sent to [facility] as they requested.

## 2020-01-23 DIAGNOSIS — E11.9 TYPE 2 DIABETES MELLITUS WITHOUT COMPLICATION, WITHOUT LONG-TERM CURRENT USE OF INSULIN (HCC): Primary | ICD-10-CM

## 2020-01-23 RX ORDER — BLOOD SUGAR DIAGNOSTIC
STRIP MISCELLANEOUS
Qty: 100 STRIP | Refills: 3 | Status: SHIPPED | OUTPATIENT
Start: 2020-01-23 | End: 2021-01-22

## 2020-03-26 ENCOUNTER — TELEPHONE (OUTPATIENT)
Dept: RHEUMATOLOGY | Facility: CLINIC | Age: 80
End: 2020-03-26

## 2020-04-03 ENCOUNTER — APPOINTMENT (OUTPATIENT)
Dept: LAB | Age: 80
End: 2020-04-03
Attending: INTERNAL MEDICINE
Payer: MEDICARE

## 2020-04-03 PROCEDURE — 86140 C-REACTIVE PROTEIN: CPT | Performed by: INTERNAL MEDICINE

## 2020-04-03 PROCEDURE — 85652 RBC SED RATE AUTOMATED: CPT | Performed by: INTERNAL MEDICINE

## 2020-04-03 PROCEDURE — 36415 COLL VENOUS BLD VENIPUNCTURE: CPT | Performed by: INTERNAL MEDICINE

## 2020-04-03 PROCEDURE — 80053 COMPREHEN METABOLIC PANEL: CPT | Performed by: INTERNAL MEDICINE

## 2020-04-03 PROCEDURE — 85025 COMPLETE CBC W/AUTO DIFF WBC: CPT | Performed by: INTERNAL MEDICINE

## 2020-04-06 ENCOUNTER — TELEPHONE (OUTPATIENT)
Dept: RHEUMATOLOGY | Facility: CLINIC | Age: 80
End: 2020-04-06

## 2020-04-06 ENCOUNTER — TELEPHONE (OUTPATIENT)
Dept: CARDIOLOGY | Age: 80
End: 2020-04-06

## 2020-04-06 NOTE — TELEPHONE ENCOUNTER
Patient phoned office, would like to see if she can obtain a refill of Micaela Hermosillo. Explained to patient that she has not had a follow visit since July with Dr. Daniel Villalta. Appointment are necessary with the medication she has been taking.  Pt verbalizes understand

## 2020-04-09 ENCOUNTER — VIRTUAL PHONE E/M (OUTPATIENT)
Dept: RHEUMATOLOGY | Facility: CLINIC | Age: 80
End: 2020-04-09
Payer: MEDICARE

## 2020-04-09 DIAGNOSIS — M15.9 PRIMARY OSTEOARTHRITIS INVOLVING MULTIPLE JOINTS: ICD-10-CM

## 2020-04-09 DIAGNOSIS — G89.29 CHRONIC PAIN OF BOTH KNEES: ICD-10-CM

## 2020-04-09 DIAGNOSIS — H04.123 DRY EYE SYNDROME OF BOTH EYES: ICD-10-CM

## 2020-04-09 DIAGNOSIS — R76.8 SS-A ANTIBODY POSITIVE: ICD-10-CM

## 2020-04-09 DIAGNOSIS — M25.561 CHRONIC PAIN OF BOTH KNEES: ICD-10-CM

## 2020-04-09 DIAGNOSIS — M25.562 CHRONIC PAIN OF BOTH KNEES: ICD-10-CM

## 2020-04-09 DIAGNOSIS — M06.9 RHEUMATOID ARTHRITIS INVOLVING BOTH HANDS, UNSPECIFIED RHEUMATOID FACTOR PRESENCE: Primary | ICD-10-CM

## 2020-04-09 DIAGNOSIS — Z79.899 HIGH RISK MEDICATION USE: ICD-10-CM

## 2020-04-09 PROCEDURE — 99442 PHONE E/M BY PHYS 11-20 MIN: CPT | Performed by: INTERNAL MEDICINE

## 2020-04-09 NOTE — PROGRESS NOTES
Telephone appointment - audio only  Office visit canceled due to coronavirus pandemic    Lamberto Corbett  consents to a virtual/telephone check in service on 04/09/2020.   Patient understands and accepts financial responsibility for any deductible, coinsuranc use  Chronic pain of both knees  Dry eye syndrome  SSA positive  Primary OA of multiple joints     Plan:  -- recommend pt to continue Xeljanz 11 mg XR daily  -- discussed that would ideally have disease remission for at least 1 to 2 years prior to consider infections and can re-activate tuberculosis or viral hepatitis. This was discussed at length with the patient as well as pamphlet provided to patient. Patient verbalized understanding of above.  Parviz Spaulding agrees to proceed with therapy with Kavitha Gilbert (to

## 2020-04-20 ENCOUNTER — OFFICE VISIT (OUTPATIENT)
Dept: CARDIOLOGY | Age: 80
End: 2020-04-20

## 2020-04-20 VITALS
WEIGHT: 123 LBS | SYSTOLIC BLOOD PRESSURE: 139 MMHG | BODY MASS INDEX: 24.15 KG/M2 | HEIGHT: 60 IN | DIASTOLIC BLOOD PRESSURE: 72 MMHG | HEART RATE: 59 BPM

## 2020-04-20 DIAGNOSIS — E11.9 DIABETES MELLITUS TYPE 2 IN NONOBESE (CMD): ICD-10-CM

## 2020-04-20 DIAGNOSIS — I25.10 CORONARY ARTERY DISEASE INVOLVING NATIVE CORONARY ARTERY OF NATIVE HEART WITHOUT ANGINA PECTORIS: ICD-10-CM

## 2020-04-20 DIAGNOSIS — E78.00 PURE HYPERCHOLESTEROLEMIA: ICD-10-CM

## 2020-04-20 DIAGNOSIS — I25.10 CORONARY ARTERY CALCIFICATION SEEN ON CT SCAN: Primary | ICD-10-CM

## 2020-04-20 PROCEDURE — 99443 TELEPHONE E&M BY PHYSICIAN EST PT NOT ORIG PREV 7 DAYS 21-30 MIN: CPT | Performed by: INTERNAL MEDICINE

## 2020-04-20 ASSESSMENT — ENCOUNTER SYMPTOMS
ALLERGIC/IMMUNOLOGIC COMMENTS: NO NEW FOOD ALLERGIES
BRUISES/BLEEDS EASILY: 0
FEVER: 0
COUGH: 0
HEMATOCHEZIA: 0
WEIGHT LOSS: 0
SUSPICIOUS LESIONS: 0
CHILLS: 0
WEIGHT GAIN: 0
HEMOPTYSIS: 0

## 2020-04-20 ASSESSMENT — PATIENT HEALTH QUESTIONNAIRE - PHQ9
SUM OF ALL RESPONSES TO PHQ9 QUESTIONS 1 AND 2: 0
2. FEELING DOWN, DEPRESSED OR HOPELESS: NOT AT ALL
SUM OF ALL RESPONSES TO PHQ9 QUESTIONS 1 AND 2: 0
1. LITTLE INTEREST OR PLEASURE IN DOING THINGS: NOT AT ALL

## 2020-05-27 ENCOUNTER — LAB ENCOUNTER (OUTPATIENT)
Dept: LAB | Age: 80
End: 2020-05-27
Attending: INTERNAL MEDICINE
Payer: MEDICARE

## 2020-05-27 DIAGNOSIS — E78.00 PURE HYPERCHOLESTEROLEMIA: Primary | ICD-10-CM

## 2020-05-27 LAB
CHOLEST SERPL-MCNC: 276 MG/DL
HDLC SERPL-MCNC: 90 MG/DL
LDLC SERPL CALC-MCNC: 163 MG/DL
LENGTH OF FAST TIME PATIENT: YES H
NONHDLC SERPL-MCNC: 186 MG/DL
TRIGL SERPL-MCNC: 113 MG/DL
VLDLC SERPL CALC-MCNC: 23 MG/DL

## 2020-05-27 PROCEDURE — 80061 LIPID PANEL: CPT

## 2020-05-27 PROCEDURE — 36415 COLL VENOUS BLD VENIPUNCTURE: CPT

## 2020-06-02 ENCOUNTER — TELEPHONE (OUTPATIENT)
Dept: CARDIOLOGY | Age: 80
End: 2020-06-02

## 2020-06-03 ENCOUNTER — TELEPHONE (OUTPATIENT)
Dept: CARDIOLOGY | Age: 80
End: 2020-06-03

## 2020-06-03 DIAGNOSIS — E78.00 PURE HYPERCHOLESTEROLEMIA: Primary | ICD-10-CM

## 2020-06-08 ENCOUNTER — TELEPHONE (OUTPATIENT)
Dept: CARDIOLOGY | Age: 80
End: 2020-06-08

## 2020-06-08 ENCOUNTER — CLINICAL ABSTRACT (OUTPATIENT)
Dept: CARDIOLOGY | Age: 80
End: 2020-06-08

## 2020-06-13 ENCOUNTER — LAB ENCOUNTER (OUTPATIENT)
Dept: LAB | Age: 80
End: 2020-06-13
Attending: INTERNAL MEDICINE
Payer: MEDICARE

## 2020-06-13 DIAGNOSIS — E78.00 PURE HYPERCHOLESTEROLEMIA: Primary | ICD-10-CM

## 2020-06-13 LAB
ALBUMIN SERPL-MCNC: 4.8 G/DL
ALP SERPL-CCNC: 56 U/L
ALT SERPL-CCNC: 34 U/L
AST SERPL-CCNC: 32 U/L
BILIRUB SERPL-MCNC: 0.5 MG/DL
BUN SERPL-MCNC: 16 MG/DL
CALCIUM SERPL-MCNC: 9.3 MG/DL
CHLORIDE SERPL-SCNC: 102 MMOL/L
CHOLEST SERPL-MCNC: 214 MG/DL
CREAT SERPL-MCNC: 0.7 MG/DL
GLOBULIN SER-MCNC: 4.2 G/DL
GLUCOSE SERPL-MCNC: 149 MG/DL
HDLC SERPL-MCNC: 98 MG/DL
LDLC SERPL CALC-MCNC: 81 MG/DL
NONHDLC SERPL-MCNC: 116 MG/DL
POTASSIUM SERPL-SCNC: 4.1 MMOL/L
PROT SERPL-MCNC: 9 G/DL
SODIUM SERPL-SCNC: 136 MMOL/L
TRIGL SERPL-MCNC: 176 MG/DL
URATE SERPL-MCNC: 5 MG/DL

## 2020-06-13 PROCEDURE — 80053 COMPREHEN METABOLIC PANEL: CPT

## 2020-06-13 PROCEDURE — 36415 COLL VENOUS BLD VENIPUNCTURE: CPT

## 2020-06-13 PROCEDURE — 84550 ASSAY OF BLOOD/URIC ACID: CPT

## 2020-06-13 PROCEDURE — 80061 LIPID PANEL: CPT

## 2020-06-15 ENCOUNTER — CLINICAL ABSTRACT (OUTPATIENT)
Dept: CARDIOLOGY | Age: 80
End: 2020-06-15

## 2020-06-15 ENCOUNTER — TELEPHONE (OUTPATIENT)
Dept: CARDIOLOGY | Age: 80
End: 2020-06-15

## 2020-06-25 ENCOUNTER — TELEPHONE (OUTPATIENT)
Dept: RHEUMATOLOGY | Facility: CLINIC | Age: 80
End: 2020-06-25

## 2020-06-25 DIAGNOSIS — Z79.899 HIGH RISK MEDICATION USE: ICD-10-CM

## 2020-06-25 DIAGNOSIS — M06.9 RHEUMATOID ARTHRITIS INVOLVING BOTH HANDS, UNSPECIFIED RHEUMATOID FACTOR PRESENCE: Primary | ICD-10-CM

## 2020-06-26 ENCOUNTER — TELEPHONE (OUTPATIENT)
Dept: RHEUMATOLOGY | Facility: CLINIC | Age: 80
End: 2020-06-26

## 2020-07-03 ENCOUNTER — HOSPITAL ENCOUNTER (EMERGENCY)
Age: 80
Discharge: HOME | End: 2020-07-03
Attending: EMERGENCY MEDICINE
Payer: MEDICARE

## 2020-07-03 ENCOUNTER — TELEPHONE (OUTPATIENT)
Dept: RHEUMATOLOGY | Facility: CLINIC | Age: 80
End: 2020-07-03

## 2020-07-03 VITALS
RESPIRATION RATE: 18 BRPM | TEMPERATURE: 99 F | SYSTOLIC BLOOD PRESSURE: 176 MMHG | HEART RATE: 90 BPM | OXYGEN SATURATION: 98 % | DIASTOLIC BLOOD PRESSURE: 79 MMHG

## 2020-07-03 DIAGNOSIS — B35.3 TINEA PEDIS OF BOTH FEET: Primary | ICD-10-CM

## 2020-07-03 LAB — GLUCOSE BLD-MCNC: 121 MG/DL (ref 70–99)

## 2020-07-03 PROCEDURE — 99282 EMERGENCY DEPT VISIT SF MDM: CPT

## 2020-07-03 PROCEDURE — 82962 GLUCOSE BLOOD TEST: CPT

## 2020-07-03 RX ORDER — CLOTRIMAZOLE 1 %
1 CREAM (GRAM) TOPICAL 2 TIMES DAILY
Qty: 12 G | Refills: 0 | Status: SHIPPED | OUTPATIENT
Start: 2020-07-03 | End: 2020-07-03

## 2020-07-03 RX ORDER — CLOTRIMAZOLE 1 %
1 CREAM (GRAM) TOPICAL 2 TIMES DAILY
Qty: 14 G | Refills: 0 | Status: SHIPPED | OUTPATIENT
Start: 2020-07-03 | End: 2021-03-18

## 2020-07-03 NOTE — TELEPHONE ENCOUNTER
Phoned pt, told pt she will need to stop her xeljanz, Pt upset, states it was helping her joint pain. Dr. Melvi Beard would like pt to be evaluated by ED or seen by Dr. Preet Plata to be further evaluated. Concerned this could be possible shingles outbreak.   Pt stat

## 2020-07-03 NOTE — ED PROVIDER NOTES
Patient Seen in: [facility] In [place]      History   Patient presents with:  Rash Skin Problem    Stated Complaint: bilat foot rash for 8days    71-year-old  female with a history of prediabetes presents to the ER today with c O2 Device None (Room air)       Current:BP (!) 176/79   Pulse 90   Temp 99.2 °F (37.3 °C) (Temporal)   Resp 18   SpO2 98%         Physical Exam  Vitals signs and nursing note reviewed. Constitutional:       General: She is not in acute distress.      Appe

## 2020-07-03 NOTE — TELEPHONE ENCOUNTER
Patient phoned office, currently taking xeljanz 11mg. Feels like she is having a reaction. Co itching,  red rash like symptoms to her face, neck and feet. Denies hives or other symptoms.

## 2020-07-06 ENCOUNTER — TELEPHONE (OUTPATIENT)
Dept: RHEUMATOLOGY | Facility: CLINIC | Age: 80
End: 2020-07-06

## 2020-07-06 ENCOUNTER — TELEPHONE (OUTPATIENT)
Dept: FAMILY MEDICINE CLINIC | Facility: CLINIC | Age: 80
End: 2020-07-06

## 2020-07-06 NOTE — TELEPHONE ENCOUNTER
Patient presents with:  Rash Skin Problem   Stated Complaint: bilat foot rash for 10 days    Clinical Impression:  Tinea pedis of both feet  (primary encounter diagnosis)     Disposition:  Discharge  7/3/2020 11:57 am  Follow up on Monday.  Call for appoint

## 2020-07-06 NOTE — TELEPHONE ENCOUNTER
Patient called stating she went to the ER and wanted Dr. Isabel Schumacher to know so she can take a look at the ER notes and after reviewing them let her know if she needs an appointment to come in and be seen.

## 2020-07-07 ENCOUNTER — OFFICE VISIT (OUTPATIENT)
Dept: RHEUMATOLOGY | Facility: CLINIC | Age: 80
End: 2020-07-07
Payer: MEDICARE

## 2020-07-07 VITALS
WEIGHT: 128 LBS | TEMPERATURE: 97 F | HEART RATE: 88 BPM | DIASTOLIC BLOOD PRESSURE: 90 MMHG | RESPIRATION RATE: 16 BRPM | SYSTOLIC BLOOD PRESSURE: 150 MMHG | HEIGHT: 58 IN | BODY MASS INDEX: 26.87 KG/M2

## 2020-07-07 DIAGNOSIS — Z79.899 HIGH RISK MEDICATION USE: ICD-10-CM

## 2020-07-07 DIAGNOSIS — M79.641 BILATERAL HAND PAIN: ICD-10-CM

## 2020-07-07 DIAGNOSIS — M79.642 BILATERAL HAND PAIN: ICD-10-CM

## 2020-07-07 DIAGNOSIS — M15.9 PRIMARY OSTEOARTHRITIS INVOLVING MULTIPLE JOINTS: ICD-10-CM

## 2020-07-07 DIAGNOSIS — H04.123 DRY EYE SYNDROME OF BOTH EYES: ICD-10-CM

## 2020-07-07 DIAGNOSIS — M06.9 RHEUMATOID ARTHRITIS INVOLVING BOTH HANDS, UNSPECIFIED RHEUMATOID FACTOR PRESENCE: Primary | ICD-10-CM

## 2020-07-07 PROCEDURE — 99214 OFFICE O/P EST MOD 30 MIN: CPT | Performed by: INTERNAL MEDICINE

## 2020-07-07 NOTE — PROGRESS NOTES
?  RHEUMATOLOGY Follow up   Date of visit: 07/07/2020  ? Patient presents with:  Rheumatoid Arthritis: est pt, fu-- Pt was co redness, burning to feet, went to er- fungus- started on clotrimazole- Taking Xeljanz-Feels great-joint pain has gone away     ? works by blocking the body’s production of enzymes called Janus kinases (JAKs). JAKs play a role in joint inflammation in RA and PsA, which can cause pain, swelling, and stiffness.  The most common side effects of tofacitinib are upper respiratory tract inf disease.  -Aggressive interventions have been shown to be helpful in reducing the potential complications including disability.  Most recently, aggressive control of Rheumatoid Arthritis has been demonstrated to actually reduce the frequency of Cardiovascul ears which seems to have gotten worse the past several months where it is more persistent. Denies any other overt pain/swelling in the joints as previously experienced. Denies significant morning stiffness or joint pain/swelling.   Did suffer rash on her lasting about 2-3 hours each more. Does have significant fatigue since the pain started. Has had some hair loss. Does get a redness and itching over her thigh and calf at times, states she is very sensitive to different perfumes and lotions.  Feels li COLONOSCOPY  03/23/2017    Dr Stanley ibarra    • EGD  03/23/2017    Dr. Fariba Avelar    • ORAL SURGERY PROCEDURE       Family History:  History reviewed. No pertinent family history.   Social History:  Social History    Tobacco Use      Smoking status: Never for congestion, ear pain, hearing loss, nosebleeds and sore throat. Eyes: Negative for blurred vision, double vision and photophobia. Respiratory: Negative for cough, shortness of breath and wheezing.     Cardiovascular: Negative for chest pain, palpit tenderness or edema. Comments: No active synovitis   [place] neck deformities noted of right middle and left pinky finger.   Early boutonierre deformities of bilateral thumbs  No swelling, tenderness, redness or restriction of motion of the DIPs, PIPs, MC 06/13/2020    AST 32 06/13/2020    ALT 34 06/13/2020    BILT 0.5 06/13/2020    TP 9.0 (H) 06/13/2020    ALB 4.8 06/13/2020    GLOBULIN 4.2 06/13/2020     06/13/2020    K 4.1 06/13/2020     06/13/2020    CO2 28.0 06/13/2020       Additional Labs

## 2020-07-09 ENCOUNTER — TELEPHONE (OUTPATIENT)
Dept: CARDIOLOGY | Age: 80
End: 2020-07-09

## 2020-07-13 ENCOUNTER — OFFICE VISIT (OUTPATIENT)
Dept: FAMILY MEDICINE CLINIC | Facility: CLINIC | Age: 80
End: 2020-07-13
Payer: MEDICARE

## 2020-07-13 VITALS
HEIGHT: 58 IN | DIASTOLIC BLOOD PRESSURE: 80 MMHG | SYSTOLIC BLOOD PRESSURE: 150 MMHG | WEIGHT: 125 LBS | BODY MASS INDEX: 26.24 KG/M2 | TEMPERATURE: 98 F | HEART RATE: 80 BPM | OXYGEN SATURATION: 97 %

## 2020-07-13 DIAGNOSIS — E11.9 TYPE 2 DIABETES MELLITUS WITHOUT COMPLICATION, WITHOUT LONG-TERM CURRENT USE OF INSULIN (HCC): ICD-10-CM

## 2020-07-13 DIAGNOSIS — I77.9 ARTERIAL DISEASE (HCC): ICD-10-CM

## 2020-07-13 DIAGNOSIS — I10 ESSENTIAL HYPERTENSION: Primary | ICD-10-CM

## 2020-07-13 DIAGNOSIS — I25.10 CORONARY ARTERY DISEASE INVOLVING NATIVE CORONARY ARTERY OF NATIVE HEART WITHOUT ANGINA PECTORIS: ICD-10-CM

## 2020-07-13 DIAGNOSIS — B35.3 TINEA PEDIS OF BOTH FEET: ICD-10-CM

## 2020-07-13 PROCEDURE — 99214 OFFICE O/P EST MOD 30 MIN: CPT | Performed by: FAMILY MEDICINE

## 2020-07-13 PROCEDURE — 1111F DSCHRG MED/CURRENT MED MERGE: CPT | Performed by: FAMILY MEDICINE

## 2020-07-13 RX ORDER — LOSARTAN POTASSIUM 50 MG/1
50 TABLET ORAL NIGHTLY
Qty: 30 TABLET | Refills: 1 | Status: SHIPPED | OUTPATIENT
Start: 2020-07-13 | End: 2020-09-15

## 2020-07-13 NOTE — PROGRESS NOTES
Brenda Courser is a 79year old female. HPI:       Derm problem:  Tinea pedis. Improving with use of Clotrimazole. HTN:    Uncontrolled. Severity is mild. Pt has been taking medications as instructed, no medication side effects.    Diet: Low-sod native heart without angina pectoris 5/16/2018   • Essential hypertension 5/16/2018   • Hyperglycemia 5/16/2018   • Mixed hyperlipidemia 5/16/2018   • Postmenopausal 5/16/2018   • Prediabetes 5/16/2018   • Rheumatoid arthritis involving multiple sites [facility] sounds, NT/ND, no pulsations, no r/r/g, no masses, no HSM  EXTREMITIES: no cyanosis or clubbing  NEURO: Alert and Oriented x3, CN II-XII grossly intact, no focal weakness  PSYCH: affect normal        DATA:    Component      Latest Ref Rng & Units 6/13/2020 disease ([facility] [address]  4. Coronary artery disease involving native coronary artery of native heart without angina pectoris  LDL improved. Patient under the care of cardiologist, Dr. Dilshad Foote. Continue rosuvastatin 10 mg nightly.     5. Tinea pedis of both feet

## 2020-08-07 ENCOUNTER — TELEPHONE (OUTPATIENT)
Dept: CARDIOLOGY | Age: 80
End: 2020-08-07

## 2020-08-07 DIAGNOSIS — E78.00 PURE HYPERCHOLESTEROLEMIA: Primary | ICD-10-CM

## 2020-08-07 DIAGNOSIS — I25.10 CORONARY ARTERY DISEASE INVOLVING NATIVE CORONARY ARTERY OF NATIVE HEART WITHOUT ANGINA PECTORIS: ICD-10-CM

## 2020-08-14 ENCOUNTER — LAB ENCOUNTER (OUTPATIENT)
Dept: LAB | Age: 80
End: 2020-08-14
Attending: INTERNAL MEDICINE
Payer: MEDICARE

## 2020-08-14 DIAGNOSIS — I25.10 CORONARY ATHEROSCLEROSIS OF NATIVE CORONARY ARTERY: ICD-10-CM

## 2020-08-14 DIAGNOSIS — E78.00 PURE HYPERCHOLESTEROLEMIA: Primary | ICD-10-CM

## 2020-08-14 LAB
ALBUMIN SERPL-MCNC: 4.3 G/DL (ref 3.4–5)
ALBUMIN/GLOB SERPL: 1.1 {RATIO} (ref 1–2)
ALP LIVER SERPL-CCNC: 54 U/L (ref 55–142)
ALT SERPL-CCNC: 27 U/L (ref 13–56)
ANION GAP SERPL CALC-SCNC: 6 MMOL/L (ref 0–18)
AST SERPL-CCNC: 21 U/L (ref 15–37)
BILIRUB SERPL-MCNC: 0.4 MG/DL (ref 0.1–2)
BUN BLD-MCNC: 14 MG/DL (ref 7–18)
BUN/CREAT SERPL: 22.2 (ref 10–20)
CALCIUM BLD-MCNC: 9 MG/DL (ref 8.5–10.1)
CHLORIDE SERPL-SCNC: 104 MMOL/L (ref 98–112)
CHOLEST SMN-MCNC: 250 MG/DL (ref ?–200)
CO2 SERPL-SCNC: 27 MMOL/L (ref 21–32)
CREAT BLD-MCNC: 0.63 MG/DL (ref 0.55–1.02)
GLOBULIN PLAS-MCNC: 3.8 G/DL (ref 2.8–4.4)
GLUCOSE BLD-MCNC: 123 MG/DL (ref 70–99)
HDLC SERPL-MCNC: 84 MG/DL (ref 40–59)
LDLC SERPL CALC-MCNC: 138 MG/DL (ref ?–100)
M PROTEIN MFR SERPL ELPH: 8.1 G/DL (ref 6.4–8.2)
NONHDLC SERPL-MCNC: 166 MG/DL (ref ?–130)
OSMOLALITY SERPL CALC.SUM OF ELEC: 286 MOSM/KG (ref 275–295)
PATIENT FASTING Y/N/NP: YES
PATIENT FASTING Y/N/NP: YES
POTASSIUM SERPL-SCNC: 3.7 MMOL/L (ref 3.5–5.1)
SODIUM SERPL-SCNC: 137 MMOL/L (ref 136–145)
TRIGL SERPL-MCNC: 138 MG/DL (ref 30–149)
VLDLC SERPL CALC-MCNC: 28 MG/DL (ref 0–30)

## 2020-08-14 PROCEDURE — 36415 COLL VENOUS BLD VENIPUNCTURE: CPT

## 2020-08-14 PROCEDURE — 80053 COMPREHEN METABOLIC PANEL: CPT

## 2020-08-14 PROCEDURE — 80061 LIPID PANEL: CPT

## 2020-08-17 ENCOUNTER — OFFICE VISIT (OUTPATIENT)
Dept: FAMILY MEDICINE CLINIC | Facility: CLINIC | Age: 80
End: 2020-08-17
Payer: MEDICARE

## 2020-08-17 VITALS
DIASTOLIC BLOOD PRESSURE: 74 MMHG | HEIGHT: 58.27 IN | BODY MASS INDEX: 26.38 KG/M2 | SYSTOLIC BLOOD PRESSURE: 124 MMHG | OXYGEN SATURATION: 98 % | WEIGHT: 127.38 LBS | HEART RATE: 81 BPM | TEMPERATURE: 98 F

## 2020-08-17 DIAGNOSIS — H25.9 AGE-RELATED CATARACT OF BOTH EYES, UNSPECIFIED AGE-RELATED CATARACT TYPE: ICD-10-CM

## 2020-08-17 DIAGNOSIS — I77.9 ARTERIAL DISEASE (HCC): ICD-10-CM

## 2020-08-17 DIAGNOSIS — E11.9 TYPE 2 DIABETES MELLITUS WITHOUT COMPLICATION, WITHOUT LONG-TERM CURRENT USE OF INSULIN (HCC): ICD-10-CM

## 2020-08-17 DIAGNOSIS — E78.2 MIXED HYPERLIPIDEMIA: ICD-10-CM

## 2020-08-17 DIAGNOSIS — I25.10 CORONARY ARTERY DISEASE INVOLVING NATIVE CORONARY ARTERY OF NATIVE HEART WITHOUT ANGINA PECTORIS: ICD-10-CM

## 2020-08-17 DIAGNOSIS — Z00.00 MEDICARE ANNUAL WELLNESS VISIT, SUBSEQUENT: Primary | ICD-10-CM

## 2020-08-17 DIAGNOSIS — I10 ESSENTIAL HYPERTENSION: ICD-10-CM

## 2020-08-17 DIAGNOSIS — I25.10 CORONARY ARTERY CALCIFICATION SEEN ON CT SCAN: ICD-10-CM

## 2020-08-17 DIAGNOSIS — Z12.31 ENCOUNTER FOR SCREENING MAMMOGRAM FOR MALIGNANT NEOPLASM OF BREAST: ICD-10-CM

## 2020-08-17 PROBLEM — E78.00 PURE HYPERCHOLESTEROLEMIA: Status: RESOLVED | Noted: 2018-04-16 | Resolved: 2020-08-17

## 2020-08-17 LAB
CARTRIDGE LOT#: 638 NUMERIC
HEMOGLOBIN A1C: 6.4 % (ref 4.3–5.6)

## 2020-08-17 PROCEDURE — 83036 HEMOGLOBIN GLYCOSYLATED A1C: CPT | Performed by: FAMILY MEDICINE

## 2020-08-17 PROCEDURE — G0439 PPPS, SUBSEQ VISIT: HCPCS | Performed by: FAMILY MEDICINE

## 2020-08-17 PROCEDURE — 99214 OFFICE O/P EST MOD 30 MIN: CPT | Performed by: FAMILY MEDICINE

## 2020-08-17 NOTE — PROGRESS NOTES
HPI:   Jerry Peck is a 79year old female who presents for a Medicare Subsequent Annual Wellness visit (Pt already had Initial Annual Wellness).                 Fall/Risk Assessment   She has been screened for Falls and is low risk: Fall/Risk Scoring: Medicine)  Shereen Tucker MD (CARDIOLOGY)  Erin Mosley DO (RHEUMATOLOGY)  Rosamaria Zendejas PT (Physical Therapy)  Jero Peters MD as Referring Physician (OPHTHALMOLOGY)    Patient Active Problem List:     Rheumatoid arthritis involving multiple sites [facility] Oral Tab, Take 1 tablet (50 mg total) by mouth nightly. clotrimazole (LOTRIMIN AF) 1 % External Cream, Apply 1 Application topically 2 (two) times daily.   Glucose Blood (ACCU-CHEK GUIDE) In Vitro Strip, Daily  ACCU-CHEK FASTCLIX LANCETS Does not apply Mis headaches  PSYCH: denies depression or anxiety      EXAM:   /74 (BP Location: Left arm, Patient Position: Sitting, Cuff Size: adult)   Pulse 81   Temp 98.2 °F (36.8 °C)   Ht 58.27\"   Wt 127 lb 6.4 oz (57.8 kg)   SpO2 98%   BMI 26.38 kg/m²  Estimated Pneumovax 23 06/17/2019        DATA:    Component      Latest Ref Rng & Units 8/14/2020 6/13/2020 5/27/2020 4/27/2019   Cholesterol, Total      <200 mg/dL 250 (H) 214 (H) 276 (H) 186   HDL Cholesterol      40 - 59 mg/dL 84 (H) 98 (H) 90 (H) 70 (H)   Trigly neoplasm of breast  - JIMI DIAMOND 2D+3D SCREENING BILAT (CPT=77067/25104); Future    3. Type 2 diabetes mellitus without complication, without long-term current use of insulin (HCC)  Diet and exercise controlled.   A1c in office today is 6.4.    - HEMOGLOBIN A External Lab or Procedure   Diabetes Screening      HbgA1C   Annually Lab Results   Component Value Date    A1C 6.4 (A) 08/17/2020    No flowsheet data found.     Fasting Blood Sugar (FSB)Annually Glucose (mg/dL)   Date Value   08/14/2020 123 (H)   02/27/20 Once after 65 05/16/2018 Please get once after your 65th birthday    Pneumococcal 23 (Pneumovax)  Covered Once after 65 06/17/2019 Please get once after your 65th birthday    Hepatitis B for Moderate/High Risk No vaccine history found Medium/high risk fact Dilated Eye Exam 9/20/2019     No flowsheet data found.             Template: ROMELIA LEMONS MEDICARE ANNUAL ASSESSMENT FEMALE [[id number]]

## 2020-08-17 NOTE — PATIENT INSTRUCTIONS
Wagner Walsh's SCREENING SCHEDULE   Tests on this list are recommended by your physician but may not be covered, or covered at this frequency, by your insurer. Please check with your insurance carrier before scheduling to verify coverage.    PREVENTATIVE aortic aneurysm screening (once between ages 73-68) IPPE only No results found for this or any previous visit.  Limited to patients who meet one of the following criteria:   • Men who are 73-68 years old and have smoked more than 100 cigarettes in their lif found.    Screening Mammogram      Mammogram    Recommend Annually to at least age 76, and as needed after 76 Mammogram due on 09/19/2020 Please get this Mammogram regularly   Immunizations      Influenza  Covered Annually No orders found for this or any p computer and printer. (the forms are also available in [address])  [email or web address]  This link also has information from the [facility] regarding Advance Directives.

## 2020-08-18 PROBLEM — H25.9 AGE-RELATED CATARACT OF BOTH EYES: Status: ACTIVE | Noted: 2020-08-18

## 2020-08-24 ENCOUNTER — TELEPHONE (OUTPATIENT)
Dept: CARDIOLOGY | Age: 80
End: 2020-08-24

## 2020-08-24 DIAGNOSIS — I25.10 CORONARY ARTERY DISEASE INVOLVING NATIVE CORONARY ARTERY OF NATIVE HEART WITHOUT ANGINA PECTORIS: ICD-10-CM

## 2020-08-24 DIAGNOSIS — E78.00 PURE HYPERCHOLESTEROLEMIA: Primary | ICD-10-CM

## 2020-08-24 RX ORDER — EZETIMIBE 10 MG/1
10 TABLET ORAL DAILY
Qty: 30 TABLET | Refills: 3 | Status: SHIPPED | OUTPATIENT
Start: 2020-08-24 | End: 2020-08-26

## 2020-09-15 DIAGNOSIS — I10 ESSENTIAL HYPERTENSION: ICD-10-CM

## 2020-09-15 RX ORDER — LOSARTAN POTASSIUM 50 MG/1
50 TABLET ORAL NIGHTLY
Qty: 90 TABLET | Refills: 1 | Status: SHIPPED | OUTPATIENT
Start: 2020-09-15 | End: 2021-05-13

## 2020-09-15 NOTE — TELEPHONE ENCOUNTER
Per patient plan, patient needs 90 day supply     Protocol met.    Refill sent Pt seen on 02/13/2020 and results were given

## 2020-09-24 ENCOUNTER — HOSPITAL ENCOUNTER (OUTPATIENT)
Dept: MAMMOGRAPHY | Age: 80
Discharge: HOME | End: 2020-09-24
Attending: FAMILY MEDICINE
Payer: MEDICARE

## 2020-09-24 DIAGNOSIS — Z12.31 ENCOUNTER FOR SCREENING MAMMOGRAM FOR MALIGNANT NEOPLASM OF BREAST: ICD-10-CM

## 2020-09-24 PROCEDURE — 77067 SCR MAMMO BI INCL CAD: CPT | Performed by: FAMILY MEDICINE

## 2020-09-24 PROCEDURE — 77063 BREAST TOMOSYNTHESIS BI: CPT | Performed by: FAMILY MEDICINE

## 2020-10-07 ENCOUNTER — LAB ENCOUNTER (OUTPATIENT)
Dept: LAB | Age: 80
End: 2020-10-07
Attending: INTERNAL MEDICINE
Payer: MEDICARE

## 2020-10-07 ENCOUNTER — TELEPHONE (OUTPATIENT)
Dept: RHEUMATOLOGY | Facility: CLINIC | Age: 80
End: 2020-10-07

## 2020-10-07 DIAGNOSIS — Z79.899 HIGH RISK MEDICATION USE: ICD-10-CM

## 2020-10-07 DIAGNOSIS — I25.10 CORONARY ATHEROSCLEROSIS OF NATIVE CORONARY ARTERY: ICD-10-CM

## 2020-10-07 DIAGNOSIS — E78.00 PURE HYPERCHOLESTEROLEMIA: Primary | ICD-10-CM

## 2020-10-07 DIAGNOSIS — M06.9 RHEUMATOID ARTHRITIS INVOLVING BOTH HANDS (HCC): ICD-10-CM

## 2020-10-07 PROCEDURE — 84165 PROTEIN E-PHORESIS SERUM: CPT

## 2020-10-07 PROCEDURE — 83883 ASSAY NEPHELOMETRY NOT SPEC: CPT

## 2020-10-07 PROCEDURE — 80053 COMPREHEN METABOLIC PANEL: CPT

## 2020-10-07 PROCEDURE — 36415 COLL VENOUS BLD VENIPUNCTURE: CPT

## 2020-10-07 PROCEDURE — 85025 COMPLETE CBC W/AUTO DIFF WBC: CPT

## 2020-10-07 PROCEDURE — 80061 LIPID PANEL: CPT

## 2020-10-07 PROCEDURE — 86334 IMMUNOFIX E-PHORESIS SERUM: CPT

## 2020-10-07 NOTE — TELEPHONE ENCOUNTER
Pt called stating she had labs drawn per cardiology this morning, Dr Janett Morris. They asked her about Dr Sisi Palomo labs and if they should also be drawn? Pt was unaware of orders from this office.    I called [facility] lab to see if labs could be added to w

## 2020-10-26 ENCOUNTER — OFFICE VISIT (OUTPATIENT)
Dept: RHEUMATOLOGY | Facility: CLINIC | Age: 80
End: 2020-10-26
Payer: MEDICARE

## 2020-10-26 VITALS
WEIGHT: 131.19 LBS | SYSTOLIC BLOOD PRESSURE: 158 MMHG | HEIGHT: 60 IN | HEART RATE: 82 BPM | RESPIRATION RATE: 18 BRPM | TEMPERATURE: 98 F | DIASTOLIC BLOOD PRESSURE: 78 MMHG | BODY MASS INDEX: 25.76 KG/M2

## 2020-10-26 DIAGNOSIS — M06.00 RHEUMATOID ARTHRITIS WITH NEGATIVE RHEUMATOID FACTOR, INVOLVING UNSPECIFIED SITE (HCC): Primary | ICD-10-CM

## 2020-10-26 DIAGNOSIS — R76.8 SS-A ANTIBODY POSITIVE: ICD-10-CM

## 2020-10-26 DIAGNOSIS — Z79.899 HIGH RISK MEDICATION USE: ICD-10-CM

## 2020-10-26 DIAGNOSIS — G72.0 STATIN MYOPATHY: ICD-10-CM

## 2020-10-26 DIAGNOSIS — M15.9 PRIMARY OSTEOARTHRITIS INVOLVING MULTIPLE JOINTS: ICD-10-CM

## 2020-10-26 DIAGNOSIS — T46.6X5A STATIN MYOPATHY: ICD-10-CM

## 2020-10-26 DIAGNOSIS — H04.123 DRY EYE SYNDROME OF BOTH EYES: ICD-10-CM

## 2020-10-26 PROCEDURE — 99214 OFFICE O/P EST MOD 30 MIN: CPT | Performed by: INTERNAL MEDICINE

## 2020-10-26 RX ORDER — TOFACITINIB 10 MG/1
TABLET, FILM COATED ORAL
COMMUNITY
End: 2021-01-05

## 2020-10-26 RX ORDER — ROSUVASTATIN CALCIUM 10 MG/1
10 TABLET, COATED ORAL
COMMUNITY
Start: 2020-10-11

## 2020-10-26 NOTE — PROGRESS NOTES
?  RHEUMATOLOGY Follow up   Date of visit: 10/26/2020  ? Patient presents with:  Rheumatoid Arthritis: 3 month f/u. Feeling good. [employer] is working. Still have shoulder and hip achy. Hands arent painful.  Rapid score of 1.3.    ?  ASSESSMENT, DISCUSSION & Aleksandr Alvarez, and let me know if you need samples  -- otherwise follow up in 5 months or sooner as needed  -- call with questions/concerns      Discussed risks and benefits of Aleksandr Alvarez (tofacitinib) therapy with patient at length.  It is an oral, small molecule d associated accelerated atherosclerosis. In addition, patients are at much higher risk for interstitial lung disease and ophthalmologic complications, including scleromalacia. Neuropathy can also be present along, with a variety of skin lesions.  The patient pt did not like how it made her feel. She was started on [place] in June 2019. Since her last visit, she reports feeling well overall. Has been tolerating [place] daily. She does continue to have left shoulder and left hip discomfort.  States it is wo stress test due to hypertension and high cholesterol.     States she has been having pain in her left hip/thigh region. Also admits to pain starting in her pink as well as joints of her hands, particularly over her MCPs.  Does have difficulty using her cane 5/16/2018   • Mixed hyperlipidemia 5/16/2018   • Postmenopausal 5/16/2018   • Prediabetes 5/16/2018   • Rheumatoid arthritis involving multiple sites ([facility] [address] 5/16/2018   • Rheumatoid arthritis(714.0)    • Unspecified essential hypertension      Past Surgical MYALGIA  Zetia [Ezetimibe]       MYALGIA, OTHER (SEE COMMENTS)    Comment:Back aches. Zocor [Simvastatin]     MYALGIA  Colestipol              UNKNOWN  Perfumes                UNKNOWN  Mold                    Coughing  ?   REVIEW OF SYSTEMS range of motion. Neck supple. No JVD present. No tracheal deviation present. Cardiovascular: Normal rate, regular rhythm, normal heart sounds and intact distal pulses. No murmur heard.   Pulmonary/Chest: Effort normal and breath sounds normal. No respir LYMABS 1.28 10/07/2020    MOABSO 0.37 10/07/2020    EOABSO 0.03 10/07/2020    BAABSO 0.02 10/07/2020     Lab Results   Component Value Date     (H) 10/07/2020    BUN 17 10/07/2020    BUNCREA 25.8 (H) 10/07/2020    CREATSERUM 0.66 10/07/2020    ANION

## 2020-10-26 NOTE — PATIENT INSTRUCTIONS
-- continue Kavitha Wyaconda daily, without change in dose at this time   -- get labs done before next visit   -- keep me updated if there are any issues with the refill for Kavitha Wyaconda, and let me know if you need samples  -- otherwise follow up in 5 months or sooner

## 2020-11-19 ENCOUNTER — LAB ENCOUNTER (OUTPATIENT)
Dept: LAB | Age: 80
End: 2020-11-19
Attending: INTERNAL MEDICINE
Payer: MEDICARE

## 2020-11-19 ENCOUNTER — TELEPHONE (OUTPATIENT)
Dept: CARDIOLOGY | Age: 80
End: 2020-11-19

## 2020-11-19 DIAGNOSIS — E78.00 PURE HYPERCHOLESTEROLEMIA: Primary | ICD-10-CM

## 2020-11-19 DIAGNOSIS — E11.9 DIABETES MELLITUS WITHOUT COMPLICATION (HCC): Primary | ICD-10-CM

## 2020-11-19 DIAGNOSIS — E11.9 DIABETES MELLITUS TYPE 2 IN NONOBESE (CMD): ICD-10-CM

## 2020-11-19 PROCEDURE — 83036 HEMOGLOBIN GLYCOSYLATED A1C: CPT

## 2020-11-19 PROCEDURE — 36415 COLL VENOUS BLD VENIPUNCTURE: CPT

## 2020-11-20 RX ORDER — CLOTRIMAZOLE 1 %
CREAM (GRAM) TOPICAL
COMMUNITY
Start: 2020-07-03 | End: 2020-11-23

## 2020-11-23 ENCOUNTER — OFFICE VISIT (OUTPATIENT)
Dept: CARDIOLOGY | Age: 80
End: 2020-11-23

## 2020-11-23 VITALS
WEIGHT: 123 LBS | SYSTOLIC BLOOD PRESSURE: 119 MMHG | HEART RATE: 64 BPM | HEIGHT: 60 IN | BODY MASS INDEX: 24.15 KG/M2 | DIASTOLIC BLOOD PRESSURE: 67 MMHG

## 2020-11-23 DIAGNOSIS — I25.10 CORONARY ARTERY CALCIFICATION SEEN ON CT SCAN: Primary | ICD-10-CM

## 2020-11-23 DIAGNOSIS — E11.9 DIABETES MELLITUS TYPE 2 IN NONOBESE (CMD): ICD-10-CM

## 2020-11-23 DIAGNOSIS — I25.10 CORONARY ARTERY DISEASE INVOLVING NATIVE CORONARY ARTERY OF NATIVE HEART WITHOUT ANGINA PECTORIS: ICD-10-CM

## 2020-11-23 DIAGNOSIS — E78.00 PURE HYPERCHOLESTEROLEMIA: ICD-10-CM

## 2020-11-23 PROCEDURE — 99443 TELEPHONE E&M BY PHYSICIAN EST PT NOT ORIG PREV 7 DAYS 21-30 MIN: CPT | Performed by: INTERNAL MEDICINE

## 2020-11-23 ASSESSMENT — ENCOUNTER SYMPTOMS
HEMOPTYSIS: 0
WEIGHT GAIN: 0
HEMATOCHEZIA: 0
COUGH: 0
WEIGHT LOSS: 0
ALLERGIC/IMMUNOLOGIC COMMENTS: NO NEW FOOD ALLERGIES
FEVER: 0
CHILLS: 0
BRUISES/BLEEDS EASILY: 0
SUSPICIOUS LESIONS: 0

## 2020-11-23 ASSESSMENT — PATIENT HEALTH QUESTIONNAIRE - PHQ9
1. LITTLE INTEREST OR PLEASURE IN DOING THINGS: NOT AT ALL
SUM OF ALL RESPONSES TO PHQ9 QUESTIONS 1 AND 2: 0
2. FEELING DOWN, DEPRESSED OR HOPELESS: NOT AT ALL
CLINICAL INTERPRETATION OF PHQ2 SCORE: NO FURTHER SCREENING NEEDED
SUM OF ALL RESPONSES TO PHQ9 QUESTIONS 1 AND 2: 0
CLINICAL INTERPRETATION OF PHQ9 SCORE: NO FURTHER SCREENING NEEDED

## 2021-01-01 ENCOUNTER — EXTERNAL RECORD (OUTPATIENT)
Dept: OTHER | Age: 81
End: 2021-01-01

## 2021-01-05 ENCOUNTER — TELEPHONE (OUTPATIENT)
Dept: RHEUMATOLOGY | Facility: CLINIC | Age: 81
End: 2021-01-05

## 2021-01-05 DIAGNOSIS — M06.00 RHEUMATOID ARTHRITIS WITH NEGATIVE RHEUMATOID FACTOR, INVOLVING UNSPECIFIED SITE (HCC): Primary | ICD-10-CM

## 2021-01-05 RX ORDER — TOFACITINIB 10 MG/1
1 TABLET, FILM COATED ORAL DAILY
Qty: 30 TABLET | Refills: 2 | Status: SHIPPED | OUTPATIENT
Start: 2021-01-05 | End: 2021-03-29

## 2021-01-05 NOTE — TELEPHONE ENCOUNTER
Pt called and states she has new insurance   [employer] plan G  [facility] [id number]     Phone [phone]

## 2021-01-06 ENCOUNTER — DOCUMENTATION ONLY (OUTPATIENT)
Dept: RHEUMATOLOGY | Facility: CLINIC | Age: 81
End: 2021-01-06

## 2021-01-06 NOTE — PROGRESS NOTES
[facility] until 12/31/2021 for Xeljanz XR 11mg. First bottle of 30 shipped to pt's home address.

## 2021-01-12 RX ORDER — ROSUVASTATIN CALCIUM 10 MG/1
10 TABLET, COATED ORAL
Qty: 30 TABLET | Refills: 3 | Status: CANCELLED | OUTPATIENT
Start: 2021-01-14

## 2021-01-13 RX ORDER — ROSUVASTATIN CALCIUM 10 MG/1
10 TABLET, COATED ORAL
Qty: 90 TABLET | Refills: 3 | Status: SHIPPED | OUTPATIENT
Start: 2021-01-14 | End: 2022-06-06

## 2021-02-01 DIAGNOSIS — Z23 NEED FOR VACCINATION: ICD-10-CM

## 2021-03-10 ENCOUNTER — TELEPHONE (OUTPATIENT)
Dept: RHEUMATOLOGY | Facility: CLINIC | Age: 81
End: 2021-03-10

## 2021-03-10 NOTE — TELEPHONE ENCOUNTER
Pt phoned office, states she will be receiving the covid vaccine. Explained to pt she will want to hold her Anu Stefan the week of and the week after the covid vaccine. Pt voiced understanding.    Future Appointments   Date Time Provider Bart Aaron   3/

## 2021-03-15 ENCOUNTER — LABORATORY ENCOUNTER (OUTPATIENT)
Dept: LAB | Age: 81
End: 2021-03-15
Attending: INTERNAL MEDICINE
Payer: MEDICARE

## 2021-03-15 DIAGNOSIS — Z79.899 HIGH RISK MEDICATION USE: ICD-10-CM

## 2021-03-15 DIAGNOSIS — M06.00 RHEUMATOID ARTHRITIS WITH NEGATIVE RHEUMATOID FACTOR, INVOLVING UNSPECIFIED SITE (HCC): ICD-10-CM

## 2021-03-15 LAB
ALBUMIN SERPL-MCNC: 4.4 G/DL (ref 3.4–5)
ALBUMIN/GLOB SERPL: 1.2 {RATIO} (ref 1–2)
ALP LIVER SERPL-CCNC: 49 U/L
ALT SERPL-CCNC: 30 U/L
ANION GAP SERPL CALC-SCNC: 7 MMOL/L (ref 0–18)
AST SERPL-CCNC: 25 U/L (ref 15–37)
BASOPHILS # BLD AUTO: 0.01 X10(3) UL (ref 0–0.2)
BASOPHILS NFR BLD AUTO: 0.3 %
BILIRUB SERPL-MCNC: 0.6 MG/DL (ref 0.1–2)
BUN BLD-MCNC: 20 MG/DL (ref 7–18)
BUN/CREAT SERPL: 31.7 (ref 10–20)
CALCIUM BLD-MCNC: 9.2 MG/DL (ref 8.5–10.1)
CHLORIDE SERPL-SCNC: 105 MMOL/L (ref 98–112)
CO2 SERPL-SCNC: 28 MMOL/L (ref 21–32)
CREAT BLD-MCNC: 0.63 MG/DL
DEPRECATED RDW RBC AUTO: 46 FL (ref 35.1–46.3)
EOSINOPHIL # BLD AUTO: 0.05 X10(3) UL (ref 0–0.7)
EOSINOPHIL NFR BLD AUTO: 1.4 %
ERYTHROCYTE [DISTWIDTH] IN BLOOD BY AUTOMATED COUNT: 12.6 % (ref 11–15)
GLOBULIN PLAS-MCNC: 3.7 G/DL (ref 2.8–4.4)
GLUCOSE BLD-MCNC: 120 MG/DL (ref 70–99)
HCT VFR BLD AUTO: 36.1 %
HGB BLD-MCNC: 11.8 G/DL
IMM GRANULOCYTES # BLD AUTO: 0.01 X10(3) UL (ref 0–1)
IMM GRANULOCYTES NFR BLD: 0.3 %
LYMPHOCYTES # BLD AUTO: 1.47 X10(3) UL (ref 1–4)
LYMPHOCYTES NFR BLD AUTO: 40.2 %
M PROTEIN MFR SERPL ELPH: 8.1 G/DL (ref 6.4–8.2)
MCH RBC QN AUTO: 32.6 PG (ref 26–34)
MCHC RBC AUTO-ENTMCNC: 32.7 G/DL (ref 31–37)
MCV RBC AUTO: 99.7 FL
MONOCYTES # BLD AUTO: 0.31 X10(3) UL (ref 0.1–1)
MONOCYTES NFR BLD AUTO: 8.5 %
NEUTROPHILS # BLD AUTO: 1.81 X10 (3) UL (ref 1.5–7.7)
NEUTROPHILS # BLD AUTO: 1.81 X10(3) UL (ref 1.5–7.7)
NEUTROPHILS NFR BLD AUTO: 49.3 %
OSMOLALITY SERPL CALC.SUM OF ELEC: 294 MOSM/KG (ref 275–295)
PATIENT FASTING Y/N/NP: YES
PLATELET # BLD AUTO: 237 10(3)UL (ref 150–450)
POTASSIUM SERPL-SCNC: 3.6 MMOL/L (ref 3.5–5.1)
RBC # BLD AUTO: 3.62 X10(6)UL
SODIUM SERPL-SCNC: 140 MMOL/L (ref 136–145)
WBC # BLD AUTO: 3.7 X10(3) UL (ref 4–11)

## 2021-03-15 PROCEDURE — 36415 COLL VENOUS BLD VENIPUNCTURE: CPT

## 2021-03-15 PROCEDURE — 85025 COMPLETE CBC W/AUTO DIFF WBC: CPT

## 2021-03-15 PROCEDURE — 80053 COMPREHEN METABOLIC PANEL: CPT

## 2021-03-18 ENCOUNTER — OFFICE VISIT (OUTPATIENT)
Dept: RHEUMATOLOGY | Facility: CLINIC | Age: 81
End: 2021-03-18
Payer: MEDICARE

## 2021-03-18 VITALS
TEMPERATURE: 97 F | SYSTOLIC BLOOD PRESSURE: 132 MMHG | DIASTOLIC BLOOD PRESSURE: 84 MMHG | BODY MASS INDEX: 27.71 KG/M2 | RESPIRATION RATE: 16 BRPM | HEART RATE: 64 BPM | HEIGHT: 58 IN | WEIGHT: 132 LBS

## 2021-03-18 DIAGNOSIS — M06.00 RHEUMATOID ARTHRITIS WITH NEGATIVE RHEUMATOID FACTOR, INVOLVING UNSPECIFIED SITE (HCC): Primary | ICD-10-CM

## 2021-03-18 DIAGNOSIS — Z79.899 HIGH RISK MEDICATION USE: ICD-10-CM

## 2021-03-18 DIAGNOSIS — G89.29 CHRONIC PAIN OF BOTH SHOULDERS: ICD-10-CM

## 2021-03-18 DIAGNOSIS — M15.9 PRIMARY OSTEOARTHRITIS INVOLVING MULTIPLE JOINTS: ICD-10-CM

## 2021-03-18 DIAGNOSIS — M25.512 CHRONIC PAIN OF BOTH SHOULDERS: ICD-10-CM

## 2021-03-18 DIAGNOSIS — M25.511 CHRONIC PAIN OF BOTH SHOULDERS: ICD-10-CM

## 2021-03-18 DIAGNOSIS — D72.818 OTHER DECREASED WHITE BLOOD CELL (WBC) COUNT: ICD-10-CM

## 2021-03-18 PROCEDURE — 99215 OFFICE O/P EST HI 40 MIN: CPT | Performed by: INTERNAL MEDICINE

## 2021-03-18 NOTE — PROGRESS NOTES
?  RHEUMATOLOGY Follow up   Date of visit: 3/18/2021  ? Patient presents with:  Rheumatoid Arthritis: 4 month f/u. Interested in going to PT for left shoulder and left hip. Was wondering about lowering xeljanz to half dose. No joint pain in hands.      ? Domo Barnhart once daily  -- repeat labs in one month (two weeks after your 2nd dose of the vaccine)- just the CBC   -- get other routine (CBC, CMP and inflammatory markers) in 6 months prior to next visit  -- if you change your mind about physical therapy let m inflammatory arthropathy. In addition to affecting the joints and muscles leading to potential destruction of the joints and possible disability, rheumatoid arthritis also affects many other systems in the body.  In particular, the patient is at significant visit:    Rheumatoid arthritis with negative rheumatoid factor, involving unspecified site ([facility] [address]  -     CBC WITH DIFFERENTIAL WITH PLATELET; Future  -     COMP METABOLIC PANEL (14); Future  -     SED RATE, WESTERGREN (AUTOMATED);  Future  -     C-REACTIVE P the joints. Denies significant morning stiffness, improved after eating/drinking something in the mornings. Was previously on methotrexate and plaquenil without relief.      HPI from initial consultation  Was previously diagnosed with rheumatoid arthr up in the middle of night to use the bathroom. Denies waking up due to pain. And has difficulty falling back asleep. Has hx of anemia. Does continue to have reflux. Admits to dry eyes, uses Systane which helps with this. Uses every night.    Hx of tam Vaping Use      Vaping Use: Never used    Alcohol use: No    Drug use: No    Medications:  TURMERIC OR, Take by mouth., Disp: , Rfl:   Tofacitinib Citrate (XELJANZ) 10 MG Oral Tab, Take 1 tablet by mouth daily. , Disp: 30 tablet, Rfl: 2  Rosuvastatin Calciu and rash. Neurological: Positive for tingling. Negative for dizziness, sensory change and headaches. Endo/Heme/Allergies: Negative for polydipsia. Does not bruise/bleed easily. Psychiatric/Behavioral: Negative for depression.  The patient is not nervo feet.  Wrists with restriction in extension, flexion grossly intact.    Right 5th MTP with tailor's bunion   Bilateral shoulders with restriction in abduction and forward flexion left worse than right.   bilateral knees without medial joint line tenderness, Additional Labs:  10/2020  SPEP grossly n omal    06/2020  Uric acid 5.0    04/2020  ESR 9  CRP normal     04/2019  CRP 4.60 (elevated)  ESR 49 (elevated)     02/2019  CCP positive (181)  RF negative   AARON positive (no titer provided)   (N<100

## 2021-03-18 NOTE — PATIENT INSTRUCTIONS
-- continue Tata Mckeon once daily  -- repeat labs in one month (two weeks after your 2nd dose of the vaccine)- just the CBC   -- get other routine (CBC, CMP and inflammatory markers) in 6 months prior to next visit  -- if you change your mind about physical t

## 2021-03-29 ENCOUNTER — OFFICE VISIT (OUTPATIENT)
Dept: FAMILY MEDICINE CLINIC | Facility: CLINIC | Age: 81
End: 2021-03-29
Payer: MEDICARE

## 2021-03-29 VITALS
HEART RATE: 81 BPM | SYSTOLIC BLOOD PRESSURE: 124 MMHG | TEMPERATURE: 97 F | BODY MASS INDEX: 27.29 KG/M2 | WEIGHT: 130 LBS | HEIGHT: 58 IN | DIASTOLIC BLOOD PRESSURE: 70 MMHG | OXYGEN SATURATION: 97 %

## 2021-03-29 DIAGNOSIS — E11.9 TYPE 2 DIABETES MELLITUS WITHOUT COMPLICATION, WITHOUT LONG-TERM CURRENT USE OF INSULIN (HCC): Primary | ICD-10-CM

## 2021-03-29 DIAGNOSIS — B35.1 ONYCHOMYCOSIS: ICD-10-CM

## 2021-03-29 PROCEDURE — 99214 OFFICE O/P EST MOD 30 MIN: CPT | Performed by: FAMILY MEDICINE

## 2021-03-29 PROCEDURE — 83036 HEMOGLOBIN GLYCOSYLATED A1C: CPT | Performed by: FAMILY MEDICINE

## 2021-03-29 NOTE — PATIENT INSTRUCTIONS
-For the toenail fungus you can try one of the following over-the-counter medications, you can also see the podiatrist.    FungiNail  Lamisil      -Cut down on sweets and increase physical activity if you are able to help improve your diabe

## 2021-03-29 NOTE — PROGRESS NOTES
HPI:   Chani Conley is a [de-identified]year old female    No new complaints. Patient states she has been indulgent with her diet with sweets, son has been giving her gifts of sweets, patient pleasantly surprised that A1c was not worse.       Immunization History History:   Procedure Laterality Date   •      • COLONOSCOPY  2017    Dr Renetta ibarra    • EGD  2017    Dr. Tana Van    • ORAL SURGERY PROCEDURE        Social History: Social History    Tobacco Use      Smoking status: Never Smoker Calc      <100 mg/dL 78 138 (H) 81   VLDL      0 - 30 mg/dL 19 28 35 (H)   NON HDL CHOL      <130 mg/dL 97 166 (H) 116   Patient Fasting?        Yes Yes Yes       Component      Latest Ref Rng & Units 3/29/2021 11/19/2020 8/17/2020   HEMOGLOBIN A1C      4.3

## 2021-04-06 ENCOUNTER — TELEPHONE (OUTPATIENT)
Dept: RHEUMATOLOGY | Facility: CLINIC | Age: 81
End: 2021-04-06

## 2021-04-06 NOTE — TELEPHONE ENCOUNTER
Returned pt call, notified pt had stopped her Esha Fallen due to receiving the vaccine. Feeling exceptionally well since being off the medication, would like to put medication on hold for the time being.  Explained to pt joint pain may return, but states she wi

## 2021-04-14 ENCOUNTER — TELEPHONE (OUTPATIENT)
Dept: RHEUMATOLOGY | Facility: CLINIC | Age: 81
End: 2021-04-14

## 2021-04-14 NOTE — TELEPHONE ENCOUNTER
Phoned pt, pt continues to co very little joint pain. Has decided to continue to hold her Tharon Medina since receiving the Covid vaccine. Pt has received her shipment today from speciality pharmacy, not sure what she should do. Explained to pt that she needs to notify her specialty pharmacy to hold shipment. Also explained best to keep her Paty Dry, in case her joint pain increases. Pt voiced understanding, will call our office with questions or concerns.

## 2021-05-13 ENCOUNTER — TELEPHONE (OUTPATIENT)
Dept: FAMILY MEDICINE CLINIC | Facility: CLINIC | Age: 81
End: 2021-05-13

## 2021-05-13 DIAGNOSIS — I10 ESSENTIAL HYPERTENSION: ICD-10-CM

## 2021-05-13 RX ORDER — LOSARTAN POTASSIUM 50 MG/1
50 TABLET ORAL NIGHTLY
Qty: 90 TABLET | Refills: 0 | Status: SHIPPED | OUTPATIENT
Start: 2021-05-13 | End: 2021-07-07

## 2021-05-13 RX ORDER — LOSARTAN POTASSIUM 50 MG/1
TABLET ORAL
Qty: 90 TABLET | Refills: 0 | Status: SHIPPED
Start: 2021-05-13 | End: 2021-05-13

## 2021-05-13 RX ORDER — LOSARTAN POTASSIUM 50 MG/1
TABLET ORAL
Qty: 90 TABLET | Refills: 0 | Status: SHIPPED | OUTPATIENT
Start: 2021-05-13 | End: 2021-05-13

## 2021-05-13 NOTE — TELEPHONE ENCOUNTER
Pt is completely out of Losartan 50mg 1 daily. She would like a refill sent to [facility] in [facility].

## 2021-05-13 NOTE — TELEPHONE ENCOUNTER
Requested Prescriptions     Signed Prescriptions Disp Refills   • LOSARTAN POTASSIUM 50 MG Oral Tab 90 tablet 0     Sig: Take 1 tablet by mouth nightly     Authorizing Provider: Kike Varela     Ordering User: Doc Dalton     Met protocol.  Refill sen

## 2021-06-01 ENCOUNTER — TELEPHONE (OUTPATIENT)
Dept: FAMILY MEDICINE CLINIC | Facility: CLINIC | Age: 81
End: 2021-06-01

## 2021-06-01 DIAGNOSIS — E11.9 TYPE 2 DIABETES MELLITUS WITHOUT COMPLICATION, WITHOUT LONG-TERM CURRENT USE OF INSULIN (HCC): Primary | ICD-10-CM

## 2021-06-01 RX ORDER — LANCETS
1 EACH MISCELLANEOUS DAILY
Qty: 100 EACH | Refills: 1 | Status: SHIPPED | OUTPATIENT
Start: 2021-06-01 | End: 2021-10-12

## 2021-06-01 RX ORDER — BLOOD SUGAR DIAGNOSTIC
STRIP MISCELLANEOUS
Qty: 100 STRIP | Refills: 1 | Status: SHIPPED | OUTPATIENT
Start: 2021-06-01

## 2021-06-01 NOTE — TELEPHONE ENCOUNTER
Pt called states she needs a new device to check her DM and she contacted the pharmacy but they were not able to help her and told her it needed to come from her PCP.

## 2021-06-12 ENCOUNTER — OFFICE VISIT (OUTPATIENT)
Dept: FAMILY MEDICINE CLINIC | Facility: CLINIC | Age: 81
End: 2021-06-12
Payer: MEDICARE

## 2021-06-12 VITALS
OXYGEN SATURATION: 98 % | RESPIRATION RATE: 18 BRPM | SYSTOLIC BLOOD PRESSURE: 146 MMHG | DIASTOLIC BLOOD PRESSURE: 64 MMHG | TEMPERATURE: 98 F | HEART RATE: 76 BPM

## 2021-06-12 DIAGNOSIS — L50.9 URTICARIA: Primary | ICD-10-CM

## 2021-06-12 PROCEDURE — 99213 OFFICE O/P EST LOW 20 MIN: CPT | Performed by: NURSE PRACTITIONER

## 2021-06-12 NOTE — PROGRESS NOTES
CHIEF COMPLAINT:   Patient presents with:  Rash Skin Problem       HPI:    Jerry Peck is a [de-identified]year old female who presents for evaluation of a rash. Per patient rash started in the past month days. Rash has been intermittent since onset.   The affected Date   •      • COLONOSCOPY  2017    Dr Julien ibarra    • EGD  2017    Dr. Korey Bach        No family history on file.    Social History    Tobacco Use      Smoking status: Never Smoker      Smokeless toba with patient.      Utilized  with language line throughout [facility] encounter  Start daily Zyrtec- sent to pharmacy as script  Trial antihistamine for 2 weeks    Discussed concern for possible secondary cellulitis of chest from scratching as there is

## 2021-07-07 ENCOUNTER — OFFICE VISIT (OUTPATIENT)
Dept: FAMILY MEDICINE CLINIC | Facility: CLINIC | Age: 81
End: 2021-07-07
Payer: MEDICARE

## 2021-07-07 VITALS
HEIGHT: 58 IN | TEMPERATURE: 97 F | OXYGEN SATURATION: 97 % | WEIGHT: 127 LBS | SYSTOLIC BLOOD PRESSURE: 132 MMHG | DIASTOLIC BLOOD PRESSURE: 74 MMHG | HEART RATE: 78 BPM | BODY MASS INDEX: 26.66 KG/M2

## 2021-07-07 DIAGNOSIS — H02.831 DERMATOCHALASIS OF BOTH UPPER EYELIDS: ICD-10-CM

## 2021-07-07 DIAGNOSIS — I77.9 ARTERIAL DISEASE (HCC): ICD-10-CM

## 2021-07-07 DIAGNOSIS — H02.403 PTOSIS OF BOTH EYELIDS: ICD-10-CM

## 2021-07-07 DIAGNOSIS — Z12.31 ENCOUNTER FOR SCREENING MAMMOGRAM FOR MALIGNANT NEOPLASM OF BREAST: ICD-10-CM

## 2021-07-07 DIAGNOSIS — Z78.0 POSTMENOPAUSAL: ICD-10-CM

## 2021-07-07 DIAGNOSIS — Z01.818 PREOP EXAMINATION: ICD-10-CM

## 2021-07-07 DIAGNOSIS — I10 ESSENTIAL HYPERTENSION: ICD-10-CM

## 2021-07-07 DIAGNOSIS — M15.9 PRIMARY OSTEOARTHRITIS INVOLVING MULTIPLE JOINTS: ICD-10-CM

## 2021-07-07 DIAGNOSIS — E11.9 TYPE 2 DIABETES MELLITUS WITHOUT COMPLICATION, WITHOUT LONG-TERM CURRENT USE OF INSULIN (HCC): Primary | ICD-10-CM

## 2021-07-07 DIAGNOSIS — H02.834 DERMATOCHALASIS OF BOTH UPPER EYELIDS: ICD-10-CM

## 2021-07-07 DIAGNOSIS — I25.10 CORONARY ARTERY DISEASE INVOLVING NATIVE CORONARY ARTERY OF NATIVE HEART WITHOUT ANGINA PECTORIS: ICD-10-CM

## 2021-07-07 DIAGNOSIS — M06.9 RHEUMATOID ARTHRITIS INVOLVING BOTH HANDS, UNSPECIFIED WHETHER RHEUMATOID FACTOR PRESENT (HCC): ICD-10-CM

## 2021-07-07 PROCEDURE — 99215 OFFICE O/P EST HI 40 MIN: CPT | Performed by: FAMILY MEDICINE

## 2021-07-07 RX ORDER — LOSARTAN POTASSIUM 50 MG/1
50 TABLET ORAL NIGHTLY
Qty: 90 TABLET | Refills: 3 | Status: SHIPPED | OUTPATIENT
Start: 2021-07-07

## 2021-07-07 NOTE — PROGRESS NOTES
Robert Luna is a [de-identified]year old female.      HPI:     This is an 61-year-old female with type 2 diabetes, hypertension, and coronary artery disease who presents for preop H&P and medical clearance as requested by her surgeon Dr. Ceci Luna prior to bilat artery of native heart without angina pectoris 5/16/2018   • Dermatochalasis of both upper eyelids 7/9/2021   • Essential hypertension 5/16/2018   • Hyperglycemia 5/16/2018   • Mixed hyperlipidemia 5/16/2018   • Postmenopausal 5/16/2018   • Prediabetes 5/1 Position: Sitting, Cuff Size: adult)   Pulse 78   Temp 96.9 °F (36.1 °C)   Ht 4' 10\" (1.473 m)   Wt 127 lb (57.6 kg)   LMP  (LMP Unknown)   SpO2 97%   BMI 26.54 kg/m²   GENERAL: NAD, pleasant non-ill-appearing elderly female  HEAD: NCAT  NECK: supple, no Component      Latest Ref Rng & Units 3/15/2021 10/7/2020   Glucose      70 - 99 mg/dL 120 (H) 138 (H)   Sodium      136 - 145 mmol/L 140 137   Potassium      3.5 - 5.1 mmol/L 3.6 4.0   Chloride      98 - 112 mmol/L 105 104   Carbon Dioxide, Total whether rheumatoid factor present (hcc)  Arterial disease (hcc)  Coronary artery disease involving native coronary artery of native heart without angina pectoris  Preop examination  Dermatochalasis of both upper eyelids  Ptosis of both eyelids  Essential h cardiologist/lipidologist.  Continue rosuvastatin 10 mg twice a week as prescribed by Dr. Dalia Beckford. Recheck lipid panel, we can forward results to Dr. Dalia Beckford.    - LIPID PANEL; Future    5. Preop examination  6. Dermatochalasis of both upper eyelids  7.  Ptosi

## 2021-07-09 PROBLEM — B35.3 TINEA PEDIS OF BOTH FEET: Status: RESOLVED | Noted: 2020-07-13 | Resolved: 2021-07-09

## 2021-07-09 PROBLEM — H02.831 DERMATOCHALASIS OF BOTH UPPER EYELIDS: Status: ACTIVE | Noted: 2021-07-09

## 2021-07-09 PROBLEM — H02.403 PTOSIS OF BOTH EYELIDS: Status: ACTIVE | Noted: 2021-07-09

## 2021-07-09 PROBLEM — Z28.21 INFLUENZA VACCINATION DECLINED BY PATIENT: Status: RESOLVED | Noted: 2018-11-01 | Resolved: 2021-07-09

## 2021-07-09 PROBLEM — H02.834 DERMATOCHALASIS OF BOTH UPPER EYELIDS: Status: ACTIVE | Noted: 2021-07-09

## 2021-08-19 ENCOUNTER — LABORATORY ENCOUNTER (OUTPATIENT)
Dept: LAB | Age: 81
End: 2021-08-19
Attending: FAMILY MEDICINE
Payer: MEDICARE

## 2021-08-19 DIAGNOSIS — I77.9 ARTERIAL DISEASE (HCC): ICD-10-CM

## 2021-08-19 DIAGNOSIS — E11.9 TYPE 2 DIABETES MELLITUS WITHOUT COMPLICATION, WITHOUT LONG-TERM CURRENT USE OF INSULIN (HCC): ICD-10-CM

## 2021-08-19 DIAGNOSIS — I25.10 CORONARY ARTERY DISEASE INVOLVING NATIVE CORONARY ARTERY OF NATIVE HEART WITHOUT ANGINA PECTORIS: ICD-10-CM

## 2021-08-19 LAB
ALBUMIN SERPL-MCNC: 4.3 G/DL (ref 3.4–5)
ALBUMIN/GLOB SERPL: 1.1 {RATIO} (ref 1–2)
ALP LIVER SERPL-CCNC: 63 U/L
ALT SERPL-CCNC: 24 U/L
ANION GAP SERPL CALC-SCNC: 4 MMOL/L (ref 0–18)
AST SERPL-CCNC: 17 U/L (ref 15–37)
BASOPHILS # BLD AUTO: 0.02 X10(3) UL (ref 0–0.2)
BASOPHILS NFR BLD AUTO: 0.3 %
BILIRUB SERPL-MCNC: 0.5 MG/DL (ref 0.1–2)
BUN BLD-MCNC: 15 MG/DL (ref 7–18)
CALCIUM BLD-MCNC: 9.1 MG/DL (ref 8.5–10.1)
CHLORIDE SERPL-SCNC: 107 MMOL/L (ref 98–112)
CHOLEST SMN-MCNC: 191 MG/DL (ref ?–200)
CO2 SERPL-SCNC: 27 MMOL/L (ref 21–32)
CREAT BLD-MCNC: 0.63 MG/DL
CREAT UR-SCNC: 114 MG/DL
EOSINOPHIL # BLD AUTO: 0.09 X10(3) UL (ref 0–0.7)
EOSINOPHIL NFR BLD AUTO: 1.4 %
ERYTHROCYTE [DISTWIDTH] IN BLOOD BY AUTOMATED COUNT: 13.2 %
EST. AVERAGE GLUCOSE BLD GHB EST-MCNC: 163 MG/DL (ref 68–126)
GLOBULIN PLAS-MCNC: 4 G/DL (ref 2.8–4.4)
GLUCOSE BLD-MCNC: 148 MG/DL (ref 70–99)
HBA1C MFR BLD HPLC: 7.3 % (ref ?–5.7)
HCT VFR BLD AUTO: 38.1 %
HDLC SERPL-MCNC: 80 MG/DL (ref 40–59)
HGB BLD-MCNC: 11.8 G/DL
IMM GRANULOCYTES # BLD AUTO: 0.01 X10(3) UL (ref 0–1)
IMM GRANULOCYTES NFR BLD: 0.2 %
LDLC SERPL CALC-MCNC: 94 MG/DL (ref ?–100)
LYMPHOCYTES # BLD AUTO: 1.67 X10(3) UL (ref 1–4)
LYMPHOCYTES NFR BLD AUTO: 25.6 %
M PROTEIN MFR SERPL ELPH: 8.3 G/DL (ref 6.4–8.2)
MCH RBC QN AUTO: 30.1 PG (ref 26–34)
MCHC RBC AUTO-ENTMCNC: 31 G/DL (ref 31–37)
MCV RBC AUTO: 97.2 FL
MICROALBUMIN UR-MCNC: 17.6 MG/DL
MICROALBUMIN/CREAT 24H UR-RTO: 154.4 UG/MG (ref ?–30)
MONOCYTES # BLD AUTO: 0.38 X10(3) UL (ref 0.1–1)
MONOCYTES NFR BLD AUTO: 5.8 %
NEUTROPHILS # BLD AUTO: 4.36 X10 (3) UL (ref 1.5–7.7)
NEUTROPHILS # BLD AUTO: 4.36 X10(3) UL (ref 1.5–7.7)
NEUTROPHILS NFR BLD AUTO: 66.7 %
NONHDLC SERPL-MCNC: 111 MG/DL (ref ?–130)
OSMOLALITY SERPL CALC.SUM OF ELEC: 290 MOSM/KG (ref 275–295)
PATIENT FASTING Y/N/NP: YES
PATIENT FASTING Y/N/NP: YES
PLATELET # BLD AUTO: 253 10(3)UL (ref 150–450)
POTASSIUM SERPL-SCNC: 4.1 MMOL/L (ref 3.5–5.1)
RBC # BLD AUTO: 3.92 X10(6)UL
SODIUM SERPL-SCNC: 138 MMOL/L (ref 136–145)
T4 FREE SERPL-MCNC: 1 NG/DL (ref 0.8–1.7)
TRIGL SERPL-MCNC: 93 MG/DL (ref 30–149)
TSI SER-ACNC: 1.63 MIU/ML (ref 0.36–3.74)
VLDLC SERPL CALC-MCNC: 15 MG/DL (ref 0–30)
WBC # BLD AUTO: 6.5 X10(3) UL (ref 4–11)

## 2021-08-19 PROCEDURE — 80061 LIPID PANEL: CPT

## 2021-08-19 PROCEDURE — 82043 UR ALBUMIN QUANTITATIVE: CPT

## 2021-08-19 PROCEDURE — 84443 ASSAY THYROID STIM HORMONE: CPT

## 2021-08-19 PROCEDURE — 83036 HEMOGLOBIN GLYCOSYLATED A1C: CPT

## 2021-08-19 PROCEDURE — 36415 COLL VENOUS BLD VENIPUNCTURE: CPT

## 2021-08-19 PROCEDURE — 84439 ASSAY OF FREE THYROXINE: CPT

## 2021-08-19 PROCEDURE — 85025 COMPLETE CBC W/AUTO DIFF WBC: CPT

## 2021-08-19 PROCEDURE — 82570 ASSAY OF URINE CREATININE: CPT

## 2021-08-19 PROCEDURE — 80053 COMPREHEN METABOLIC PANEL: CPT

## 2021-08-23 ENCOUNTER — OFFICE VISIT (OUTPATIENT)
Dept: FAMILY MEDICINE CLINIC | Facility: CLINIC | Age: 81
End: 2021-08-23
Payer: MEDICARE

## 2021-08-23 VITALS
HEART RATE: 78 BPM | DIASTOLIC BLOOD PRESSURE: 74 MMHG | BODY MASS INDEX: 26.45 KG/M2 | TEMPERATURE: 98 F | WEIGHT: 126 LBS | HEIGHT: 58 IN | OXYGEN SATURATION: 98 % | SYSTOLIC BLOOD PRESSURE: 130 MMHG

## 2021-08-23 DIAGNOSIS — R29.898 WEAKNESS OF RIGHT LOWER EXTREMITY: ICD-10-CM

## 2021-08-23 DIAGNOSIS — D64.9 ANEMIA, UNSPECIFIED TYPE: ICD-10-CM

## 2021-08-23 DIAGNOSIS — G89.29 CHRONIC PAIN OF RIGHT LOWER EXTREMITY: ICD-10-CM

## 2021-08-23 DIAGNOSIS — E55.9 VITAMIN D DEFICIENCY: ICD-10-CM

## 2021-08-23 DIAGNOSIS — K59.00 ACUTE CONSTIPATION: ICD-10-CM

## 2021-08-23 DIAGNOSIS — Z00.00 MEDICARE ANNUAL WELLNESS VISIT, SUBSEQUENT: Primary | ICD-10-CM

## 2021-08-23 DIAGNOSIS — Z99.89 USE OF CANE AS AMBULATORY AID: ICD-10-CM

## 2021-08-23 DIAGNOSIS — M79.604 CHRONIC PAIN OF RIGHT LOWER EXTREMITY: ICD-10-CM

## 2021-08-23 PROCEDURE — G0439 PPPS, SUBSEQ VISIT: HCPCS | Performed by: FAMILY MEDICINE

## 2021-08-23 PROCEDURE — 99214 OFFICE O/P EST MOD 30 MIN: CPT | Performed by: FAMILY MEDICINE

## 2021-08-23 NOTE — PATIENT INSTRUCTIONS
-To help with constipation drink at least 64 ounces of water every day, eat plenty of fruits and veggies.     -Avoid the following foods that can contribute or cause constipation:  Fried foods  Cheese  Rice  Applesauce  Bananas  To Bone Density Screening    Bone density screening    Covered every 2 years after age 72 if diagnosed with risk of osteoporosis or estrogen deficiency.     Covered yearly for long-term glucocorticoid medication use (Steroids) Last Dexa Scan:    XR DEXA BONE Date    LDL 94 08/19/2021       Dilated Eye Exam Annually 01/19/2021       Annual Monitoring of Persistent Medications (ACE/ARB, digoxin diuretics, anticonvulsants)    Potassium Annually Lab Results   Component Value Date    K 4.1 08/19/2021         Jovon enough liquids  · Lack of exercise or physical activity (especially true for older adults)  · Changes in lifestyle or daily routine, including pregnancy, aging, work, and travel  · Frequent use or misuse of laxatives  · Ignoring the urge to have a bowel mo be taken on a regular basis. While laxatives don't cause bowel dependence, they are treating the symptoms. So your constipation may return if you don't make other changes. Talk with your healthcare provider or pharmacist if you have questions.   Prescriptio

## 2021-08-23 NOTE — PROGRESS NOTES
HPI:   Marie Schwab is a [de-identified]year old female who presents for a Medicare Subsequent Annual Wellness visit (Pt already had Initial Annual Wellness). Lower extremity pain:  Point to left buttock as to where she has \"hip pain\". Walks with a cane.   Has (Family Medicine)  Ignacio Baxter MD (CARDIOLOGY)  Archana Floyd DO ([address] [place] [address])  Owen Cr PT (Physical Therapy)  Ellie Hale MD as Referring Physician (OPHTHALMOLOGY)    Patient Active Problem List:     Rheumatoid arthritis involving multiple s penicillins, whey, statins, zetia [ezetimibe], zocor [simvastatin], colestipol, perfumes, and mold. CURRENT MEDICATIONS:   losartan Potassium 50 MG Oral Tab, Take 1 tablet (50 mg total) by mouth nightly.   Glucose Blood (ACCU-CHEK GUIDE) In Vitro Strip, of breath with exertion  CARDIOVASCULAR: denies chest pain on exertion  GI: denies abdominal pain, denies heartburn  : denies dysuria, vaginal discharge or itching, no complaint of urinary incontinence   MUSCULOSKELETAL: denies back pain and as in HPI  N strength compared to the right. Straight leg raise negative.        Vaccination History     Immunization History   Administered Date(s) Administered   • Covid-19 Vaccine Moderna 100 mcg/0.5 ml 03/16/2021, 04/03/2021   • Depo-Medrol 40mg Inj 05/08/2019   • 295 mOsm/kg  290    eGFR NON-AFR.  AMERICAN      >=60  85    eGFR       >=60  98    AST (SGOT)      15 - 37 U/L  17    ALT (SGPT)      13 - 56 U/L  24    ALKALINE PHOSPHATASE      55 - 142 U/L  63    Total Bilirubin      0.1 - 2.0 mg/dL  0.5 visit, subsequent  Patient provided info on prevention, healthcare power of , and living will. 2. Anemia, unspecified type  Iron levels in the past have been in normal range. Recommend check folic acid level and B44 level.     - FOLIC ACID SERUM General [facility]     In the past six months, have you lost more than 10 pounds without trying?: 2 - No  Has your appetite been poor?: No  How does the patient maintain a good energy level?: Other; Appropriate Exercise  How would you describe your daily ph risk or previous colonoscopy was abnormal -    No recommendations at this time    Flexible Sigmoidoscopy   Covered every 4 years -    Fecal Occult Blood Test Covered annually -   Bone Density Screening    Bone density screening    Covered every 2 years aft recommendations at this time    Creat/alb ratio Annually Lab Results   Component Value Date    MICROALBCREA 154.4 (H) 08/19/2021       LDL Annually Lab Results   Component Value Date    LDL 94 08/19/2021       Dilated Eye Exam Annually 01/19/2021       Mallory Canas

## 2021-09-14 ENCOUNTER — HOSPITAL ENCOUNTER (OUTPATIENT)
Dept: GENERAL RADIOLOGY | Age: 81
Discharge: HOME | End: 2021-09-14
Attending: INTERNAL MEDICINE
Payer: MEDICARE

## 2021-09-14 DIAGNOSIS — G89.29 CHRONIC PAIN OF RIGHT KNEE: ICD-10-CM

## 2021-09-14 DIAGNOSIS — M25.561 CHRONIC PAIN OF RIGHT KNEE: ICD-10-CM

## 2021-09-14 PROCEDURE — 73560 X-RAY EXAM OF KNEE 1 OR 2: CPT | Performed by: INTERNAL MEDICINE

## 2021-09-16 ENCOUNTER — HOSPITAL ENCOUNTER (EMERGENCY)
Age: 81
Discharge: HOME | End: 2021-09-17
Attending: EMERGENCY MEDICINE
Payer: MEDICARE

## 2021-09-16 DIAGNOSIS — R73.9 HYPERGLYCEMIA: Primary | ICD-10-CM

## 2021-09-16 LAB — GLUCOSE BLD-MCNC: 217 MG/DL (ref 70–99)

## 2021-09-16 PROCEDURE — 82962 GLUCOSE BLOOD TEST: CPT

## 2021-09-16 PROCEDURE — 99283 EMERGENCY DEPT VISIT LOW MDM: CPT

## 2021-09-17 VITALS
WEIGHT: 120 LBS | OXYGEN SATURATION: 99 % | HEIGHT: 59 IN | RESPIRATION RATE: 16 BRPM | TEMPERATURE: 98 F | HEART RATE: 69 BPM | DIASTOLIC BLOOD PRESSURE: 77 MMHG | BODY MASS INDEX: 24.19 KG/M2 | SYSTOLIC BLOOD PRESSURE: 176 MMHG

## 2021-09-17 NOTE — ED PROVIDER NOTES
Patient Seen in: THE [facility] Emergency Department In [place]      History   Patient presents with:  Medication Reaction    Stated Complaint: medication reaction and elevated blood sugar    Subjective:   HPI    81-year-old female presents reporting elevated 03/23/2017    Dr. Lubna Junior    • ORAL SURGERY PROCEDURE                  Social History    Tobacco Use      Smoking status: Never Smoker      Smokeless tobacco: Never Used    Vaping Use      Vaping Use: Never used    Alcohol use: No    Drug use:  No injection she had a day ago causing an increase. I told her to stop taking the diclofenac topical as it sounds okay could be giving her an allergic reaction causing her to feel flushed and hot.   Her pain to her knee is gone so I do not think it is necessa

## 2021-09-17 NOTE — ED INITIAL ASSESSMENT (HPI)
Pt states she got cortisone shot yesterday and states the shot elevated her blood sugar.  Pt has no complaints at this time

## 2021-09-17 NOTE — PROGRESS NOTES
Noted  Closing encounter    Future Appointments  9/22/2021  11:30 AM   Shonda Montgomery MD       DAY IM              [facility]  9/28/2021  3:00 PM    JACINDA SANTIAGO 1                PF JACK             [place]  9/28/2021  3:20 PM    PF JIMI 1

## 2021-09-20 ENCOUNTER — TELEPHONE (OUTPATIENT)
Dept: FAMILY MEDICINE CLINIC | Facility: CLINIC | Age: 81
End: 2021-09-20

## 2021-09-20 NOTE — TELEPHONE ENCOUNTER
Wagner is calling to get a Hospital follow up visit with Dr Mandie Watt she went to the [facility] on Friday and she needs to follow up with Dr Mandie Watt ASAP due to a medication reaction.  Please call Wagner at [phone]

## 2021-09-20 NOTE — TELEPHONE ENCOUNTER
Spoke to patient. States her BG went up to 200. Advised that this would happen with steroid. No longer has flush face or warm feeling. Appointment made for f/u in October.

## 2021-09-28 ENCOUNTER — HOSPITAL ENCOUNTER (OUTPATIENT)
Dept: MAMMOGRAPHY | Age: 81
Discharge: HOME | End: 2021-09-28
Attending: FAMILY MEDICINE
Payer: MEDICARE

## 2021-09-28 ENCOUNTER — HOSPITAL ENCOUNTER (OUTPATIENT)
Dept: BONE DENSITY | Age: 81
Discharge: HOME | End: 2021-09-28
Attending: FAMILY MEDICINE
Payer: MEDICARE

## 2021-09-28 DIAGNOSIS — Z12.31 ENCOUNTER FOR SCREENING MAMMOGRAM FOR MALIGNANT NEOPLASM OF BREAST: ICD-10-CM

## 2021-09-28 DIAGNOSIS — Z78.0 POSTMENOPAUSAL: ICD-10-CM

## 2021-09-28 PROCEDURE — 77067 SCR MAMMO BI INCL CAD: CPT | Performed by: FAMILY MEDICINE

## 2021-09-28 PROCEDURE — 77080 DXA BONE DENSITY AXIAL: CPT | Performed by: FAMILY MEDICINE

## 2021-09-28 PROCEDURE — 77063 BREAST TOMOSYNTHESIS BI: CPT | Performed by: FAMILY MEDICINE

## 2021-10-11 DIAGNOSIS — E11.9 TYPE 2 DIABETES MELLITUS WITHOUT COMPLICATION, WITHOUT LONG-TERM CURRENT USE OF INSULIN (HCC): Primary | ICD-10-CM

## 2021-10-12 RX ORDER — LANCETS
EACH MISCELLANEOUS
Qty: 102 EACH | Refills: 2 | Status: SHIPPED | OUTPATIENT
Start: 2021-10-12

## 2021-10-12 NOTE — TELEPHONE ENCOUNTER
Refill Passed Protocol:     Pt requesting refill of lancets    Refill was approved and sent to pharmacy:   Last Office Visit with Provider: 8/23/21    Appt. scheduled on 10/21/21

## 2021-10-18 ENCOUNTER — LAB ENCOUNTER (OUTPATIENT)
Dept: LAB | Age: 81
End: 2021-10-18
Attending: FAMILY MEDICINE
Payer: MEDICARE

## 2021-10-18 DIAGNOSIS — Z79.899 HIGH RISK MEDICATION USE: ICD-10-CM

## 2021-10-18 DIAGNOSIS — E55.9 VITAMIN D DEFICIENCY: ICD-10-CM

## 2021-10-18 DIAGNOSIS — D64.9 ANEMIA, UNSPECIFIED TYPE: ICD-10-CM

## 2021-10-18 DIAGNOSIS — D72.818 OTHER DECREASED WHITE BLOOD CELL (WBC) COUNT: ICD-10-CM

## 2021-10-18 DIAGNOSIS — M06.00 RHEUMATOID ARTHRITIS WITH NEGATIVE RHEUMATOID FACTOR, INVOLVING UNSPECIFIED SITE (HCC): ICD-10-CM

## 2021-10-18 PROCEDURE — 82306 VITAMIN D 25 HYDROXY: CPT

## 2021-10-18 PROCEDURE — 86140 C-REACTIVE PROTEIN: CPT

## 2021-10-18 PROCEDURE — 36415 COLL VENOUS BLD VENIPUNCTURE: CPT

## 2021-10-18 PROCEDURE — 82746 ASSAY OF FOLIC ACID SERUM: CPT

## 2021-10-18 PROCEDURE — 85025 COMPLETE CBC W/AUTO DIFF WBC: CPT

## 2021-10-18 PROCEDURE — 85652 RBC SED RATE AUTOMATED: CPT

## 2021-10-18 PROCEDURE — 80053 COMPREHEN METABOLIC PANEL: CPT

## 2021-10-18 PROCEDURE — 82607 VITAMIN B-12: CPT

## 2021-10-21 ENCOUNTER — OFFICE VISIT (OUTPATIENT)
Dept: FAMILY MEDICINE CLINIC | Facility: CLINIC | Age: 81
End: 2021-10-21
Payer: MEDICARE

## 2021-10-21 VITALS
OXYGEN SATURATION: 98 % | DIASTOLIC BLOOD PRESSURE: 70 MMHG | SYSTOLIC BLOOD PRESSURE: 124 MMHG | BODY MASS INDEX: 24.8 KG/M2 | HEART RATE: 74 BPM | WEIGHT: 123 LBS | TEMPERATURE: 99 F | HEIGHT: 59 IN

## 2021-10-21 DIAGNOSIS — L25.1 CONTACT DERMATITIS DUE TO DRUGS IN CONTACT WITH SKIN, UNSPECIFIED CONTACT DERMATITIS TYPE: ICD-10-CM

## 2021-10-21 DIAGNOSIS — E11.65 TYPE 2 DIABETES MELLITUS WITH HYPERGLYCEMIA, WITHOUT LONG-TERM CURRENT USE OF INSULIN (HCC): Primary | ICD-10-CM

## 2021-10-21 PROCEDURE — 99214 OFFICE O/P EST MOD 30 MIN: CPT | Performed by: FAMILY MEDICINE

## 2021-10-21 RX ORDER — TRIAMCINOLONE ACETONIDE 5 MG/G
1 CREAM TOPICAL 2 TIMES DAILY
Qty: 15 G | Refills: 0 | Status: SHIPPED | OUTPATIENT
Start: 2021-10-21

## 2021-10-21 NOTE — PATIENT INSTRUCTIONS
-Okay to take over-the-counter cetirizine until seen for follow-up. Understanding Contact Dermatitis     A cool, moist compress can help reduce itching. Contact dermatitis is a common type of skin rash.  It’s cau few weeks. Treatments include:   · Cool, moist compress. Use a clean damp cloth. Put it on the area for 20 to 30 minutes, 5 to 6 times a day for the first 3 days. · Steroid cream or ointment. You can apply this medicine several times a day on clean skin. as a substitute for professional medical care. Always follow your healthcare professional's instructions. Contact Dermatitis  Contact dermatitis is a skin rash caused by something that touches the skin and makes it irritated and inflamed.  Your skin to wear a wet piece of clothing under a dry one. Wear a damp shirt under a dry shirt if your upper body is affected. This can relieve itching and prevent you from scratching the affected area.   · You can also help ease large areas of itching by taking a aaron reserved. This information is not intended as a substitute for professional medical care. Always follow your healthcare professional's instructions.

## 2021-10-21 NOTE — PROGRESS NOTES
Luis Manuel Calvillo is a [de-identified]year old female. HPI:     Type 2 diabetes:  Patient noted hyperglycemia 1 day after corticosteroid injection to the right knee, patient went to the emergency department for this because she was worried about it.       After applyin 5/16/2018   • Mixed hyperlipidemia 5/16/2018   • Postmenopausal 5/16/2018   • Prediabetes 5/16/2018   • Ptosis of both eyelids 7/9/2021   • Rheumatoid arthritis involving both hands ([facility] 2/11/2019   • Rheumatoid arthritis involving multiple sites ([facility] 5/ 4.3 - 5.6 %  6.8 (A)    Cartridge Lot#      Numeric  [id number]    Cartridge Expiration Date      Date  11/30/2022    HEMOGLOBIN A1c      <5.7 % 7.3 (H)  6.8 (H)   ESTIMATED AVERAGE GLUCOSE      68 - 126 mg/dL 163 (H)  148 (H)               ASSESSMENT AND PLAN:

## 2021-10-29 ENCOUNTER — OFFICE VISIT (OUTPATIENT)
Dept: FAMILY MEDICINE CLINIC | Facility: CLINIC | Age: 81
End: 2021-10-29
Payer: MEDICARE

## 2021-10-29 VITALS
TEMPERATURE: 100 F | HEART RATE: 93 BPM | HEIGHT: 59 IN | SYSTOLIC BLOOD PRESSURE: 127 MMHG | BODY MASS INDEX: 24.8 KG/M2 | WEIGHT: 123 LBS | DIASTOLIC BLOOD PRESSURE: 68 MMHG | OXYGEN SATURATION: 96 %

## 2021-10-29 DIAGNOSIS — E11.65 TYPE 2 DIABETES MELLITUS WITH HYPERGLYCEMIA, WITHOUT LONG-TERM CURRENT USE OF INSULIN (HCC): ICD-10-CM

## 2021-10-29 DIAGNOSIS — R21 RASH AND NONSPECIFIC SKIN ERUPTION: Primary | ICD-10-CM

## 2021-10-29 DIAGNOSIS — L25.1 CONTACT DERMATITIS DUE TO DRUGS IN CONTACT WITH SKIN, UNSPECIFIED CONTACT DERMATITIS TYPE: ICD-10-CM

## 2021-10-29 PROCEDURE — 99214 OFFICE O/P EST MOD 30 MIN: CPT | Performed by: FAMILY MEDICINE

## 2021-10-29 NOTE — PROGRESS NOTES
Jun Hackett is a [de-identified]year old female. HPI:       Rash:  Bilateral dorsal wrists. Overall has improved with using prescription corticosteroid. She has been using the triamcinolone for the last 8 days.         Wt Readings from Last 6 Encounters:  10/29 Past Surgical History:   Procedure Laterality Date   •      • COLONOSCOPY  2017    Dr Perfecto ibarra    • EGD  2017    Dr. Nirali Montemayor    • ORAL SURGERY PROCEDURE        Social History:    Social History    Tobacco Use      Smoking 10.0 - 20.0 31.7 (H)    CALCIUM      8.5 - 10.1 mg/dL 9.2    CALCULATED OSMOLALITY      275 - 295 mOsm/kg 294    eGFR NON-AFR.  AMERICAN      >=60 85    eGFR       >=60 98    AST (SGOT)      15 - 37 U/L 25    ALT (SGPT)      13 - 56 U/L 30 Cholesterol, Total      <200 mg/dL 191     HDL Cholesterol      40 - 59 mg/dL 80 (H)     Triglycerides      30 - 149 mg/dL 93     LDL Cholesterol Calc      <100 mg/dL 94     VLDL      0 - 30 mg/dL 15     NON HDL CHOL      <130 mg/dL 111     HEMOGLOBIN A1

## 2021-11-01 ENCOUNTER — TELEPHONE (OUTPATIENT)
Dept: CARDIOLOGY | Age: 81
End: 2021-11-01

## 2021-11-01 DIAGNOSIS — E78.00 PURE HYPERCHOLESTEROLEMIA: Primary | ICD-10-CM

## 2021-11-10 ENCOUNTER — TELEPHONE (OUTPATIENT)
Dept: FAMILY MEDICINE CLINIC | Facility: CLINIC | Age: 81
End: 2021-11-10

## 2021-11-10 NOTE — TELEPHONE ENCOUNTER
Wagner is calling to give Dr Vera Horton a update on the tropical cream she is using, it is healing well, no ruff skin Please call Wagner with any questions or concerns at [phone]

## 2021-11-10 NOTE — TELEPHONE ENCOUNTER
Called patient. She states she is doing a lot better. Area is just dry now. Denies redness and itching.  Will follow up as directed and will continue to monitor symptoms

## 2021-11-11 ENCOUNTER — LAB ENCOUNTER (OUTPATIENT)
Dept: LAB | Age: 81
End: 2021-11-11
Payer: MEDICARE

## 2021-11-11 DIAGNOSIS — E78.00 PURE HYPERCHOLESTEROLEMIA: Primary | ICD-10-CM

## 2021-11-11 LAB
ALBUMIN SERPL-MCNC: 4.2 G/DL (ref 3.4–5)
ALBUMIN/GLOB SERPL: 1.1 {RATIO} (ref 1–2)
ALP SERPL-CCNC: 64 U/L (ref 55–142)
ALT SERPL-CCNC: 22 U/L (ref 13–56)
ANION GAP SERPL CALC-SCNC: 6 MMOL/L (ref 0–18)
AST SERPL-CCNC: 17 U/L (ref 15–37)
BILIRUB SERPL-MCNC: 0.5 MG/DL (ref 0.1–2)
BUN SERPL-MCNC: 15 MG/DL (ref 7–18)
CALCIUM SERPL-MCNC: 9.3 MG/DL (ref 8.5–10.1)
CHLORIDE SERPL-SCNC: 106 MMOL/L (ref 98–112)
CHOLEST SERPL-MCNC: 183 MG/DL
CO2 SERPL-SCNC: 28 MMOL/L (ref 21–32)
CREAT SERPL-MCNC: 0.64 MG/DL (ref 0.55–1.02)
GLOBULIN SER-MCNC: 4 G/DL (ref 2.8–4.4)
GLUCOSE SERPL-MCNC: 157 MG/DL (ref 70–99)
HDLC SERPL-MCNC: 68 MG/DL (ref 40–59)
LDLC SERPL CALC-MCNC: 94 MG/DL
LENGTH OF FAST TIME PATIENT: YES H
LENGTH OF FAST TIME PATIENT: YES H
NONHDLC SERPL-MCNC: 115 MG/DL
POTASSIUM SERPL-SCNC: 4.2 MMOL/L (ref 3.5–5.1)
PROT SERPL-MCNC: 8.2 G/DL (ref 6.4–8.2)
SODIUM SERPL-SCNC: 140 MMOL/L (ref 136–145)
TRIGL SERPL-MCNC: 121 MG/DL (ref 30–149)
VLDLC SERPL CALC-MCNC: 20 MG/DL (ref 0–30)

## 2021-11-11 PROCEDURE — 36415 COLL VENOUS BLD VENIPUNCTURE: CPT

## 2021-11-11 PROCEDURE — 80053 COMPREHEN METABOLIC PANEL: CPT

## 2021-11-11 PROCEDURE — 80061 LIPID PANEL: CPT

## 2021-11-15 ENCOUNTER — OFFICE VISIT (OUTPATIENT)
Dept: CARDIOLOGY | Age: 81
End: 2021-11-15

## 2021-11-15 VITALS
BODY MASS INDEX: 23.44 KG/M2 | HEART RATE: 86 BPM | SYSTOLIC BLOOD PRESSURE: 181 MMHG | WEIGHT: 120 LBS | DIASTOLIC BLOOD PRESSURE: 80 MMHG

## 2021-11-15 DIAGNOSIS — E11.9 DIABETES MELLITUS TYPE 2 IN NONOBESE (CMD): ICD-10-CM

## 2021-11-15 DIAGNOSIS — I25.10 CORONARY ARTERY CALCIFICATION SEEN ON CT SCAN: ICD-10-CM

## 2021-11-15 DIAGNOSIS — I25.10 CORONARY ARTERY DISEASE INVOLVING NATIVE CORONARY ARTERY OF NATIVE HEART WITHOUT ANGINA PECTORIS: ICD-10-CM

## 2021-11-15 DIAGNOSIS — E78.00 PURE HYPERCHOLESTEROLEMIA: ICD-10-CM

## 2021-11-15 DIAGNOSIS — E78.5 DYSLIPIDEMIA: Primary | ICD-10-CM

## 2021-11-15 PROCEDURE — 99214 OFFICE O/P EST MOD 30 MIN: CPT | Performed by: INTERNAL MEDICINE

## 2021-11-15 RX ORDER — EZETIMIBE 10 MG/1
10 TABLET ORAL DAILY
Qty: 90 TABLET | Refills: 3 | Status: SHIPPED | OUTPATIENT
Start: 2021-11-15 | End: 2022-03-07

## 2021-11-15 ASSESSMENT — ENCOUNTER SYMPTOMS
COUGH: 0
ALLERGIC/IMMUNOLOGIC COMMENTS: NO NEW FOOD ALLERGIES
WEIGHT GAIN: 0
CHILLS: 0
FEVER: 0
WEIGHT LOSS: 0
SUSPICIOUS LESIONS: 0
HEMATOCHEZIA: 0
HEMOPTYSIS: 0
BRUISES/BLEEDS EASILY: 0

## 2021-11-17 ENCOUNTER — TELEPHONE (OUTPATIENT)
Dept: RHEUMATOLOGY | Facility: CLINIC | Age: 81
End: 2021-11-17

## 2021-11-17 NOTE — TELEPHONE ENCOUNTER
Returned pt's call; hands are really bothering her. She is doing Acupuncture and reports it is helping a little. Pt wondering if Dr Suellen Bhagat would recommend anything else?     Pt reports taking OTC Tylenol Arthritis #1, maybe 3 times per week    LOV 3-18-21

## 2021-11-17 NOTE — TELEPHONE ENCOUNTER
Called pt to update her per notes from Dr NOEL:  Please let pt know that I unfortunately haven't seen her so hard to make recommendations. I know she was previously on [place]. But stopped due to vaccination and covid. Missed her appt in October.  Let her kno

## 2021-11-18 ENCOUNTER — TELEPHONE (OUTPATIENT)
Dept: FAMILY MEDICINE CLINIC | Facility: CLINIC | Age: 81
End: 2021-11-18

## 2021-11-18 ENCOUNTER — TELEPHONE (OUTPATIENT)
Dept: CARDIOLOGY | Age: 81
End: 2021-11-18

## 2021-11-18 DIAGNOSIS — Z91.81 AT RISK FOR FALLS: ICD-10-CM

## 2021-11-18 DIAGNOSIS — R29.898 WEAKNESS OF BOTH LOWER EXTREMITIES: ICD-10-CM

## 2021-11-18 DIAGNOSIS — M15.9 PRIMARY OSTEOARTHRITIS INVOLVING MULTIPLE JOINTS: Primary | ICD-10-CM

## 2021-11-18 NOTE — TELEPHONE ENCOUNTER
Wagner is calling to see if Dr Felisha Barksdale would approve of her going to physical therapy for her arthiritis it has been really bothering her lately, Please call Wagner at [phone]

## 2021-11-18 NOTE — TELEPHONE ENCOUNTER
LOV 10/29/21  No FOV     Patient requesting order for PT at [facility] near home ([address] [place]). States she has been struggling with arthritis for many years but recently her right wrist and right knee have been flaring up.  She said it make

## 2021-11-19 RX ORDER — LOSARTAN POTASSIUM 50 MG/1
50 TABLET ORAL DAILY
Qty: 90 TABLET | Refills: 0 | Status: SHIPPED | OUTPATIENT
Start: 2021-11-19

## 2021-11-19 RX ORDER — LOSARTAN POTASSIUM 25 MG/1
25 TABLET ORAL 2 TIMES DAILY
Qty: 90 TABLET | Refills: 0 | Status: SHIPPED | OUTPATIENT
Start: 2021-11-19 | End: 2021-11-19 | Stop reason: DRUGHIGH

## 2021-11-30 ENCOUNTER — TELEPHONE (OUTPATIENT)
Dept: CARDIOLOGY | Age: 81
End: 2021-11-30

## 2021-12-01 ENCOUNTER — TELEPHONE (OUTPATIENT)
Dept: FAMILY MEDICINE CLINIC | Facility: CLINIC | Age: 81
End: 2021-12-01

## 2021-12-01 NOTE — TELEPHONE ENCOUNTER
Wagner would like a call today regarding a medication that she is on it is too much for her, Please call Wagner at [phone]

## 2021-12-01 NOTE — TELEPHONE ENCOUNTER
FYI Patient wanted to inform Dr. Platt [facility] office will forward lab results to Dr. Marylee Collie

## 2021-12-08 ENCOUNTER — TELEPHONE (OUTPATIENT)
Dept: FAMILY MEDICINE CLINIC | Facility: CLINIC | Age: 81
End: 2021-12-08

## 2021-12-08 NOTE — TELEPHONE ENCOUNTER
One year script sent 7/7/21. No refills needed at this time  losartan Potassium 50 MG Oral Tab 90 tablet 3 7/7/2021     Sig - Route: Take 1 tablet (50 mg total) by mouth nightly.  - Oral        Left VM for patient to call pharmacy

## 2021-12-08 NOTE — TELEPHONE ENCOUNTER
Wagner is calling to get a refill on her losartan Potassium 50 MG Oral Tab sent to her [address] on [address] questions or concerns call Wagner at [phone]

## 2021-12-15 ENCOUNTER — TELEPHONE (OUTPATIENT)
Dept: CARDIOLOGY | Age: 81
End: 2021-12-15

## 2021-12-28 ENCOUNTER — TELEPHONE (OUTPATIENT)
Dept: FAMILY MEDICINE CLINIC | Facility: CLINIC | Age: 81
End: 2021-12-28

## 2021-12-29 NOTE — TELEPHONE ENCOUNTER
Patient requesting ortho referral for her osteoarthritis     [facility] referral information given.  She will call to make appt

## 2021-12-30 ENCOUNTER — HOSPITAL ENCOUNTER (OUTPATIENT)
Dept: GENERAL RADIOLOGY | Age: 81
Discharge: HOME | End: 2021-12-30
Attending: INTERNAL MEDICINE
Payer: MEDICARE

## 2021-12-30 DIAGNOSIS — M19.90 OSTEOARTHRITIS, UNSPECIFIED OSTEOARTHRITIS TYPE, UNSPECIFIED SITE: ICD-10-CM

## 2021-12-30 DIAGNOSIS — G89.29 CHRONIC PAIN OF RIGHT KNEE: ICD-10-CM

## 2021-12-30 DIAGNOSIS — M25.561 CHRONIC PAIN OF RIGHT KNEE: ICD-10-CM

## 2021-12-30 DIAGNOSIS — Z87.39 HX OF RHEUMATOID ARTHRITIS: ICD-10-CM

## 2021-12-30 PROCEDURE — 73562 X-RAY EXAM OF KNEE 3: CPT | Performed by: INTERNAL MEDICINE

## 2021-12-30 PROCEDURE — 73130 X-RAY EXAM OF HAND: CPT | Performed by: INTERNAL MEDICINE

## 2021-12-30 PROCEDURE — 72190 X-RAY EXAM OF PELVIS: CPT | Performed by: INTERNAL MEDICINE

## 2021-12-30 PROCEDURE — 73630 X-RAY EXAM OF FOOT: CPT | Performed by: INTERNAL MEDICINE

## 2022-02-28 ENCOUNTER — LAB ENCOUNTER (OUTPATIENT)
Dept: LAB | Age: 82
End: 2022-02-28
Attending: FAMILY MEDICINE
Payer: MEDICARE

## 2022-02-28 DIAGNOSIS — E11.65 TYPE 2 DIABETES MELLITUS WITH HYPERGLYCEMIA, WITHOUT LONG-TERM CURRENT USE OF INSULIN (HCC): ICD-10-CM

## 2022-02-28 LAB
ALBUMIN SERPL-MCNC: 4.2 G/DL (ref 3.4–5)
ALBUMIN/GLOB SERPL: 1.3 {RATIO} (ref 1–2)
ALP LIVER SERPL-CCNC: 45 U/L
ALT SERPL-CCNC: 23 U/L
AST SERPL-CCNC: 16 U/L (ref 15–37)
BASOPHILS # BLD AUTO: 0.02 X10(3) UL (ref 0–0.2)
BASOPHILS NFR BLD AUTO: 0.3 %
BILIRUB SERPL-MCNC: 0.5 MG/DL (ref 0.1–2)
BUN BLD-MCNC: 17 MG/DL (ref 7–18)
CALCIUM BLD-MCNC: 9 MG/DL (ref 8.5–10.1)
CHLORIDE SERPL-SCNC: 105 MMOL/L (ref 98–112)
CHOLEST SERPL-MCNC: 268 MG/DL (ref ?–200)
CO2 SERPL-SCNC: 30 MMOL/L (ref 21–32)
CREAT BLD-MCNC: 0.66 MG/DL
CREAT UR-SCNC: 65.4 MG/DL
EOSINOPHIL # BLD AUTO: 0.05 X10(3) UL (ref 0–0.7)
EOSINOPHIL NFR BLD AUTO: 0.9 %
ERYTHROCYTE [DISTWIDTH] IN BLOOD BY AUTOMATED COUNT: 14.6 %
EST. AVERAGE GLUCOSE BLD GHB EST-MCNC: 154 MG/DL (ref 68–126)
FASTING PATIENT LIPID ANSWER: YES
FASTING STATUS PATIENT QL REPORTED: YES
GLOBULIN PLAS-MCNC: 3.2 G/DL (ref 2.8–4.4)
GLUCOSE BLD-MCNC: 103 MG/DL (ref 70–99)
HBA1C MFR BLD: 7 % (ref ?–5.7)
HCT VFR BLD AUTO: 37.3 %
HDLC SERPL-MCNC: 118 MG/DL (ref 40–59)
HGB BLD-MCNC: 12.3 G/DL
IMM GRANULOCYTES # BLD AUTO: 0.02 X10(3) UL (ref 0–1)
IMM GRANULOCYTES NFR BLD: 0.3 %
LDLC SERPL CALC-MCNC: 130 MG/DL (ref ?–100)
LYMPHOCYTES NFR BLD AUTO: 49.6 %
MCH RBC QN AUTO: 32 PG (ref 26–34)
MCHC RBC AUTO-ENTMCNC: 33 G/DL (ref 31–37)
MCV RBC AUTO: 97.1 FL
MICROALBUMIN UR-MCNC: 2.83 MG/DL
MICROALBUMIN/CREAT 24H UR-RTO: 43.3 UG/MG (ref ?–30)
MONOCYTES # BLD AUTO: 0.3 X10(3) UL (ref 0.1–1)
MONOCYTES NFR BLD AUTO: 5.2 %
NEUTROPHILS # BLD AUTO: 2.53 X10 (3) UL (ref 1.5–7.7)
NEUTROPHILS # BLD AUTO: 2.53 X10(3) UL (ref 1.5–7.7)
NEUTROPHILS NFR BLD AUTO: 43.7 %
NONHDLC SERPL-MCNC: 150 MG/DL (ref ?–130)
OSMOLALITY SERPL CALC.SUM OF ELEC: 290 MOSM/KG (ref 275–295)
PLATELET # BLD AUTO: 235 10(3)UL (ref 150–450)
POTASSIUM SERPL-SCNC: 4.5 MMOL/L (ref 3.5–5.1)
PROT SERPL-MCNC: 7.4 G/DL (ref 6.4–8.2)
RBC # BLD AUTO: 3.84 X10(6)UL
SODIUM SERPL-SCNC: 139 MMOL/L (ref 136–145)
T4 FREE SERPL-MCNC: 1 NG/DL (ref 0.8–1.7)
TRIGL SERPL-MCNC: 119 MG/DL (ref 30–149)
TSI SER-ACNC: 2.48 MIU/ML (ref 0.36–3.74)
VLDLC SERPL CALC-MCNC: 21 MG/DL (ref 0–30)
WBC # BLD AUTO: 5.8 X10(3) UL (ref 4–11)

## 2022-02-28 PROCEDURE — 83036 HEMOGLOBIN GLYCOSYLATED A1C: CPT

## 2022-02-28 PROCEDURE — 84439 ASSAY OF FREE THYROXINE: CPT

## 2022-02-28 PROCEDURE — 84443 ASSAY THYROID STIM HORMONE: CPT

## 2022-02-28 PROCEDURE — 85025 COMPLETE CBC W/AUTO DIFF WBC: CPT

## 2022-02-28 PROCEDURE — 82570 ASSAY OF URINE CREATININE: CPT

## 2022-02-28 PROCEDURE — 82043 UR ALBUMIN QUANTITATIVE: CPT

## 2022-02-28 PROCEDURE — 80053 COMPREHEN METABOLIC PANEL: CPT

## 2022-02-28 PROCEDURE — 36415 COLL VENOUS BLD VENIPUNCTURE: CPT

## 2022-02-28 PROCEDURE — 80061 LIPID PANEL: CPT

## 2022-03-02 ENCOUNTER — TELEPHONE (OUTPATIENT)
Dept: FAMILY MEDICINE CLINIC | Facility: CLINIC | Age: 82
End: 2022-03-02

## 2022-03-02 ENCOUNTER — OFFICE VISIT (OUTPATIENT)
Dept: FAMILY MEDICINE CLINIC | Facility: CLINIC | Age: 82
End: 2022-03-02
Payer: MEDICARE

## 2022-03-02 VITALS
BODY MASS INDEX: 24.19 KG/M2 | OXYGEN SATURATION: 97 % | DIASTOLIC BLOOD PRESSURE: 88 MMHG | WEIGHT: 120 LBS | TEMPERATURE: 98 F | HEART RATE: 71 BPM | HEIGHT: 59 IN | SYSTOLIC BLOOD PRESSURE: 158 MMHG

## 2022-03-02 DIAGNOSIS — M06.00 RHEUMATOID ARTHRITIS WITH NEGATIVE RHEUMATOID FACTOR, INVOLVING UNSPECIFIED SITE (HCC): ICD-10-CM

## 2022-03-02 DIAGNOSIS — E78.2 MIXED HYPERLIPIDEMIA: ICD-10-CM

## 2022-03-02 DIAGNOSIS — I25.10 CORONARY ARTERY CALCIFICATION SEEN ON CT SCAN: ICD-10-CM

## 2022-03-02 DIAGNOSIS — I77.9 ARTERIAL DISEASE (HCC): ICD-10-CM

## 2022-03-02 DIAGNOSIS — I10 ESSENTIAL HYPERTENSION: ICD-10-CM

## 2022-03-02 DIAGNOSIS — E11.65 TYPE 2 DIABETES MELLITUS WITH HYPERGLYCEMIA, WITHOUT LONG-TERM CURRENT USE OF INSULIN (HCC): Primary | ICD-10-CM

## 2022-03-02 DIAGNOSIS — I25.10 CORONARY ARTERY DISEASE INVOLVING NATIVE CORONARY ARTERY OF NATIVE HEART WITHOUT ANGINA PECTORIS: ICD-10-CM

## 2022-03-02 PROCEDURE — 99214 OFFICE O/P EST MOD 30 MIN: CPT | Performed by: FAMILY MEDICINE

## 2022-03-02 RX ORDER — PREDNISONE 1 MG/1
5 TABLET ORAL
COMMUNITY
Start: 2022-01-12

## 2022-03-02 RX ORDER — EZETIMIBE 10 MG/1
10 TABLET ORAL DAILY
COMMUNITY
Start: 2021-11-15 | End: 2022-03-02

## 2022-03-02 NOTE — TELEPHONE ENCOUNTER
Dann said her cardiologist told her to stop taking her cholesterol meds. She also said her B/P has been a little elevated.

## 2022-03-02 NOTE — PATIENT INSTRUCTIONS
-Follow-up on hypertension in 4 weeks.    -Follow-up on uncontrolled diabetes in 3 months, we will recheck your A1c in the office.

## 2022-03-02 NOTE — TELEPHONE ENCOUNTER
Patient says her b/p has been running high in the morning. 158/80's and up to 170/80's. She takes her medication at night. Appointment made.

## 2022-03-03 ENCOUNTER — TELEPHONE (OUTPATIENT)
Dept: FAMILY MEDICINE CLINIC | Facility: CLINIC | Age: 82
End: 2022-03-03

## 2022-03-03 ENCOUNTER — TELEPHONE (OUTPATIENT)
Dept: CARDIOLOGY | Age: 82
End: 2022-03-03

## 2022-03-03 NOTE — TELEPHONE ENCOUNTER
Patient calling requesting lab results and UA results to be printed please call patient will come to office to       Also patient scheduled with cardio doctor

## 2022-03-07 ENCOUNTER — OFFICE VISIT (OUTPATIENT)
Dept: CARDIOLOGY | Age: 82
End: 2022-03-07

## 2022-03-07 VITALS
SYSTOLIC BLOOD PRESSURE: 148 MMHG | BODY MASS INDEX: 23.44 KG/M2 | DIASTOLIC BLOOD PRESSURE: 80 MMHG | HEART RATE: 92 BPM | WEIGHT: 120 LBS

## 2022-03-07 DIAGNOSIS — E78.00 PURE HYPERCHOLESTEROLEMIA: ICD-10-CM

## 2022-03-07 DIAGNOSIS — E11.9 DIABETES MELLITUS TYPE 2 IN NONOBESE (CMD): ICD-10-CM

## 2022-03-07 DIAGNOSIS — I25.10 CORONARY ARTERY DISEASE INVOLVING NATIVE CORONARY ARTERY OF NATIVE HEART WITHOUT ANGINA PECTORIS: ICD-10-CM

## 2022-03-07 DIAGNOSIS — I25.10 CORONARY ARTERY CALCIFICATION SEEN ON CT SCAN: Primary | ICD-10-CM

## 2022-03-07 PROCEDURE — 99214 OFFICE O/P EST MOD 30 MIN: CPT | Performed by: INTERNAL MEDICINE

## 2022-03-07 SDOH — HEALTH STABILITY: PHYSICAL HEALTH: ON AVERAGE, HOW MANY DAYS PER WEEK DO YOU ENGAGE IN MODERATE TO STRENUOUS EXERCISE (LIKE A BRISK WALK)?: 0 DAYS

## 2022-03-07 ASSESSMENT — PATIENT HEALTH QUESTIONNAIRE - PHQ9
1. LITTLE INTEREST OR PLEASURE IN DOING THINGS: NOT AT ALL
SUM OF ALL RESPONSES TO PHQ9 QUESTIONS 1 AND 2: 0
2. FEELING DOWN, DEPRESSED OR HOPELESS: NOT AT ALL
SUM OF ALL RESPONSES TO PHQ9 QUESTIONS 1 AND 2: 0
CLINICAL INTERPRETATION OF PHQ2 SCORE: NO FURTHER SCREENING NEEDED

## 2022-03-07 ASSESSMENT — ENCOUNTER SYMPTOMS
BRUISES/BLEEDS EASILY: 0
FEVER: 0
SUSPICIOUS LESIONS: 0
HEMATOCHEZIA: 0
HEMOPTYSIS: 0
COUGH: 0
CHILLS: 0
WEIGHT GAIN: 0
ALLERGIC/IMMUNOLOGIC COMMENTS: NO NEW FOOD ALLERGIES
WEIGHT LOSS: 0

## 2022-03-08 ENCOUNTER — TELEPHONE (OUTPATIENT)
Dept: RHEUMATOLOGY | Facility: CLINIC | Age: 82
End: 2022-03-08

## 2022-04-18 ENCOUNTER — TELEPHONE (OUTPATIENT)
Dept: FAMILY MEDICINE CLINIC | Facility: CLINIC | Age: 82
End: 2022-04-18

## 2022-04-18 RX ORDER — BLOOD SUGAR DIAGNOSTIC
STRIP MISCELLANEOUS
Qty: 100 STRIP | Refills: 0 | Status: SHIPPED | OUTPATIENT
Start: 2022-04-18

## 2022-04-18 NOTE — PROGRESS NOTES
Eye exam from Dr Helena Shelton received. Patient was seen 4/8/22 at [facility]. Chart was updated.

## 2022-04-18 NOTE — TELEPHONE ENCOUNTER
Eye exam from Dr Cherise Pathak received. Patient was seen 4/8/22 at [facility]. Chart was updated.

## 2022-04-18 NOTE — TELEPHONE ENCOUNTER
Pt requesting refill of Accu-Chek Guide In Vitro Strip    Last Time Medication was Prescribed :  06/01/2021 qty #100 with 1 ordered   Last Office Visit with Provider: 03/02/2022    Recommended to return by Provider: 03/30/20222    No future appointments.     Diabetic Supplies Protocol Passed 04/18/2022 11:24 AM    Appointment in the past 12 or next 3 months

## 2022-04-26 ENCOUNTER — TELEPHONE (OUTPATIENT)
Dept: FAMILY MEDICINE CLINIC | Facility: CLINIC | Age: 82
End: 2022-04-26

## 2022-04-26 NOTE — TELEPHONE ENCOUNTER
Received a fax from [facility] requesting records for medical necessity for diabetes testing supplies. Office notes faxed per their request.  Светлана Check to [phone].

## 2022-04-27 ENCOUNTER — TELEPHONE (OUTPATIENT)
Dept: CARDIOLOGY | Age: 82
End: 2022-04-27

## 2022-04-27 DIAGNOSIS — Z79.899 ON STATIN THERAPY: Primary | ICD-10-CM

## 2022-05-05 ENCOUNTER — TELEPHONE (OUTPATIENT)
Dept: FAMILY MEDICINE CLINIC | Facility: CLINIC | Age: 82
End: 2022-05-05

## 2022-05-05 NOTE — TELEPHONE ENCOUNTER
Patient states Dr. Eduin Valencia want to start her on LEQVIO for her cholesterol and she wants your opinion on that. She says that office told her her calcium scoring was high but she does not know how they have that information. She also would like her lipids checked one more time. Please advise.

## 2022-05-05 NOTE — TELEPHONE ENCOUNTER
Wagner called and said she would like to speak to a nurse. She has questions regarding medication and a few other things.

## 2022-05-07 ENCOUNTER — HOSPITAL ENCOUNTER (OUTPATIENT)
Dept: GENERAL RADIOLOGY | Age: 82
Discharge: HOME | End: 2022-05-07
Attending: INTERNAL MEDICINE
Payer: MEDICARE

## 2022-05-07 DIAGNOSIS — M25.552 ACUTE PAIN OF LEFT HIP: ICD-10-CM

## 2022-05-07 DIAGNOSIS — Z87.39 HX OF RHEUMATOID ARTHRITIS: ICD-10-CM

## 2022-05-07 PROCEDURE — 73502 X-RAY EXAM HIP UNI 2-3 VIEWS: CPT | Performed by: INTERNAL MEDICINE

## 2022-05-08 NOTE — TELEPHONE ENCOUNTER
Please inform patient that I know Dr. Pa Aguayo very well and he is an expert in his field and that I agree and recommend treatment with the [address] [place] [address]. I recommend patient patient follow-up with Dr. Pa Aguayo for recheck of her lipid panel when ordered by Dr. Pa Aguayo at the appropriate interval after she has been on the [address] [place] [address]. Dr. Pa Aguayo will determine when patient should have her lipid panel rechecked.

## 2022-05-18 ENCOUNTER — TELEPHONE (OUTPATIENT)
Dept: CARDIOLOGY | Age: 82
End: 2022-05-18

## 2022-06-06 RX ORDER — ROSUVASTATIN CALCIUM 10 MG/1
20 TABLET, COATED ORAL
Qty: 18 TABLET | Refills: 1 | Status: SHIPPED | OUTPATIENT
Start: 2022-06-06

## 2022-08-22 ENCOUNTER — TELEPHONE (OUTPATIENT)
Dept: FAMILY MEDICINE CLINIC | Facility: CLINIC | Age: 82
End: 2022-08-22

## 2022-08-22 NOTE — TELEPHONE ENCOUNTER
Patient received reminder to make appt. She states blood work done for cardiology. States she spoke with Patricia Barajas about this.  She states she needs to know if she needs to make an appt or not   She states she no longer sees Dr. Leroy Casey' know why she is calling

## 2022-09-09 ENCOUNTER — APPOINTMENT (OUTPATIENT)
Dept: CARDIOLOGY | Age: 82
End: 2022-09-09

## 2022-09-09 DIAGNOSIS — E11.9 TYPE 2 DIABETES MELLITUS WITHOUT COMPLICATION, WITHOUT LONG-TERM CURRENT USE OF INSULIN (HCC): ICD-10-CM

## 2022-09-12 DIAGNOSIS — E11.9 TYPE 2 DIABETES MELLITUS WITHOUT COMPLICATION, WITHOUT LONG-TERM CURRENT USE OF INSULIN (HCC): ICD-10-CM

## 2022-09-12 RX ORDER — BLOOD SUGAR DIAGNOSTIC
STRIP MISCELLANEOUS
Refills: 0 | OUTPATIENT
Start: 2022-09-12

## 2022-09-13 RX ORDER — BLOOD SUGAR DIAGNOSTIC
STRIP MISCELLANEOUS
Qty: 100 STRIP | Refills: 0 | Status: SHIPPED | OUTPATIENT
Start: 2022-09-13

## 2022-10-10 ENCOUNTER — TELEPHONE (OUTPATIENT)
Dept: FAMILY MEDICINE CLINIC | Facility: CLINIC | Age: 82
End: 2022-10-10

## 2022-10-10 NOTE — TELEPHONE ENCOUNTER
I believe patient has a new PCP. Please call her to verify and remove her from our list if that is the case.

## 2022-10-10 NOTE — TELEPHONE ENCOUNTER
Spoke to patient she is seeing a new primary and she will call pharmacy and tell them to contact her new pcp    pcp change form started and in yellow folder

## 2022-11-18 ENCOUNTER — APPOINTMENT (OUTPATIENT)
Dept: CARDIOLOGY | Age: 82
End: 2022-11-18

## 2022-12-13 NOTE — TELEPHONE ENCOUNTER
Fax from [place] that [place] required for [employer]. Phoned pt to clarify current dose. Pt states she will be transferring care to another rheumatologist. Upcoming appointment canceled. no

## 2023-03-11 NOTE — TELEPHONE ENCOUNTER
Called pharmacy and left message with [facility] [address]    Referral placed.

## 2023-09-03 DIAGNOSIS — E11.9 TYPE 2 DIABETES MELLITUS WITHOUT COMPLICATION, WITHOUT LONG-TERM CURRENT USE OF INSULIN (HCC): ICD-10-CM

## 2023-09-06 ENCOUNTER — OFFICE VISIT (OUTPATIENT)
Dept: RHEUMATOLOGY | Facility: CLINIC | Age: 83
End: 2023-09-06
Payer: MEDICARE

## 2023-09-06 VITALS
OXYGEN SATURATION: 98 % | BODY MASS INDEX: 24.27 KG/M2 | HEIGHT: 59.5 IN | RESPIRATION RATE: 16 BRPM | HEART RATE: 85 BPM | TEMPERATURE: 98 F | WEIGHT: 122 LBS

## 2023-09-06 DIAGNOSIS — M05.79 RHEUMATOID ARTHRITIS INVOLVING MULTIPLE SITES WITH POSITIVE RHEUMATOID FACTOR (HCC): Primary | ICD-10-CM

## 2023-09-06 PROCEDURE — 99215 OFFICE O/P EST HI 40 MIN: CPT | Performed by: INTERNAL MEDICINE

## 2023-09-06 RX ORDER — MULTIVIT WITH MINERALS/LUTEIN
1000 TABLET ORAL DAILY
COMMUNITY

## 2023-09-06 RX ORDER — TOFACITINIB 11 MG/1
1 TABLET, FILM COATED, EXTENDED RELEASE ORAL DAILY
COMMUNITY

## 2023-09-06 RX ORDER — ROSUVASTATIN CALCIUM 10 MG/1
10 TABLET, COATED ORAL NIGHTLY
COMMUNITY

## 2023-09-06 RX ORDER — MELATONIN
1000 DAILY
COMMUNITY

## 2023-09-07 RX ORDER — LANCETS
EACH MISCELLANEOUS
Qty: 102 EACH | Refills: 0 | OUTPATIENT
Start: 2023-09-07

## 2023-09-11 ENCOUNTER — LAB ENCOUNTER (OUTPATIENT)
Dept: LAB | Age: 83
End: 2023-09-11
Attending: INTERNAL MEDICINE
Payer: MEDICARE

## 2023-09-11 DIAGNOSIS — M05.79 RHEUMATOID ARTHRITIS INVOLVING MULTIPLE SITES WITH POSITIVE RHEUMATOID FACTOR (HCC): ICD-10-CM

## 2023-09-11 LAB
ALBUMIN SERPL-MCNC: 4.6 G/DL (ref 3.4–5)
ALBUMIN/GLOB SERPL: 1.1 {RATIO} (ref 1–2)
ALP LIVER SERPL-CCNC: 49 U/L
ALT SERPL-CCNC: 31 U/L
ANION GAP SERPL CALC-SCNC: 6 MMOL/L (ref 0–18)
AST SERPL-CCNC: 29 U/L (ref 15–37)
BASOPHILS # BLD AUTO: 0.01 X10(3) UL (ref 0–0.2)
BASOPHILS NFR BLD AUTO: 0.2 %
BILIRUB SERPL-MCNC: 0.5 MG/DL (ref 0.1–2)
BUN BLD-MCNC: 20 MG/DL (ref 7–18)
CALCIUM BLD-MCNC: 9.2 MG/DL (ref 8.5–10.1)
CHLORIDE SERPL-SCNC: 105 MMOL/L (ref 98–112)
CO2 SERPL-SCNC: 26 MMOL/L (ref 21–32)
CREAT BLD-MCNC: 0.62 MG/DL
EGFRCR SERPLBLD CKD-EPI 2021: 89 ML/MIN/1.73M2 (ref 60–?)
EOSINOPHIL # BLD AUTO: 0.04 X10(3) UL (ref 0–0.7)
EOSINOPHIL NFR BLD AUTO: 1 %
ERYTHROCYTE [DISTWIDTH] IN BLOOD BY AUTOMATED COUNT: 12.2 %
ERYTHROCYTE [SEDIMENTATION RATE] IN BLOOD: 13 MM/HR
FASTING STATUS PATIENT QL REPORTED: YES
GLOBULIN PLAS-MCNC: 4.2 G/DL (ref 2.8–4.4)
GLUCOSE BLD-MCNC: 137 MG/DL (ref 70–99)
HCT VFR BLD AUTO: 35.1 %
HGB BLD-MCNC: 11.8 G/DL
IMM GRANULOCYTES # BLD AUTO: 0.01 X10(3) UL (ref 0–1)
IMM GRANULOCYTES NFR BLD: 0.2 %
LYMPHOCYTES # BLD AUTO: 1.33 X10(3) UL (ref 1–4)
LYMPHOCYTES NFR BLD AUTO: 32.5 %
MCH RBC QN AUTO: 32.7 PG (ref 26–34)
MCHC RBC AUTO-ENTMCNC: 33.6 G/DL (ref 31–37)
MCV RBC AUTO: 97.2 FL
MONOCYTES # BLD AUTO: 0.21 X10(3) UL (ref 0.1–1)
MONOCYTES NFR BLD AUTO: 5.1 %
NEUTROPHILS # BLD AUTO: 2.49 X10 (3) UL (ref 1.5–7.7)
NEUTROPHILS # BLD AUTO: 2.49 X10(3) UL (ref 1.5–7.7)
NEUTROPHILS NFR BLD AUTO: 61 %
OSMOLALITY SERPL CALC.SUM OF ELEC: 289 MOSM/KG (ref 275–295)
PLATELET # BLD AUTO: 263 10(3)UL (ref 150–450)
POTASSIUM SERPL-SCNC: 3.9 MMOL/L (ref 3.5–5.1)
PROT SERPL-MCNC: 8.8 G/DL (ref 6.4–8.2)
RBC # BLD AUTO: 3.61 X10(6)UL
SODIUM SERPL-SCNC: 137 MMOL/L (ref 136–145)
WBC # BLD AUTO: 4.1 X10(3) UL (ref 4–11)

## 2023-09-11 PROCEDURE — 86480 TB TEST CELL IMMUN MEASURE: CPT

## 2023-09-11 PROCEDURE — 85025 COMPLETE CBC W/AUTO DIFF WBC: CPT

## 2023-09-11 PROCEDURE — 36415 COLL VENOUS BLD VENIPUNCTURE: CPT

## 2023-09-11 PROCEDURE — 85652 RBC SED RATE AUTOMATED: CPT

## 2023-09-11 PROCEDURE — 80053 COMPREHEN METABOLIC PANEL: CPT

## 2023-09-13 ENCOUNTER — TELEPHONE (OUTPATIENT)
Dept: RHEUMATOLOGY | Facility: CLINIC | Age: 83
End: 2023-09-13

## 2023-09-13 LAB
M TB IFN-G CD4+ T-CELLS BLD-ACNC: 0.23 IU/ML
M TB TUBERC IFN-G BLD QL: NEGATIVE
M TB TUBERC IGNF/MITOGEN IGNF CONTROL: >10 IU/ML
QFT TB1 AG MINUS NIL: -0.07 IU/ML
QFT TB2 AG MINUS NIL: 0 IU/ML

## 2023-09-15 ENCOUNTER — TELEPHONE (OUTPATIENT)
Dept: RHEUMATOLOGY | Facility: CLINIC | Age: 83
End: 2023-09-15

## 2023-09-21 NOTE — TELEPHONE ENCOUNTER
Called pt, explained the [facility] Patient Assistance to pt. Pt is currently in the program, explained that she will need to complete a new application for [id number] as year approaches. Pt voices understanding, and will reach out at the end of the year.

## 2023-10-03 ENCOUNTER — TELEPHONE (OUTPATIENT)
Dept: RHEUMATOLOGY | Facility: CLINIC | Age: 83
End: 2023-10-03

## 2023-10-03 DIAGNOSIS — M06.9 RHEUMATOID ARTHRITIS INVOLVING MULTIPLE SITES, UNSPECIFIED WHETHER RHEUMATOID FACTOR PRESENT (HCC): Primary | ICD-10-CM

## 2023-10-03 DIAGNOSIS — M06.9 RHEUMATOID ARTHRITIS INVOLVING BOTH HANDS, UNSPECIFIED WHETHER RHEUMATOID FACTOR PRESENT (HCC): ICD-10-CM

## 2023-10-03 RX ORDER — TOFACITINIB 11 MG/1
1 TABLET, FILM COATED, EXTENDED RELEASE ORAL DAILY
Qty: 30 TABLET | Refills: 2 | Status: SHIPPED | OUTPATIENT
Start: 2023-10-03

## 2023-10-03 NOTE — TELEPHONE ENCOUNTER
Spoke to patient regarding the below message:      Pt would like poonam to call her back re her Christ Bajwa confused on what they are telling her she would like you to explain to her)     Spoke to Maryam at [facility] and he stated that they need a new script sent over to them for patient's Mirta Degree. The script can be shipped once the script is received by them. Refill request sent to Dr Rocio Fontaine and pt notified.

## 2023-11-07 ENCOUNTER — TELEPHONE (OUTPATIENT)
Dept: RHEUMATOLOGY | Facility: CLINIC | Age: 83
End: 2023-11-07

## 2023-11-08 NOTE — TELEPHONE ENCOUNTER
Called pt, explained the [employer] Patient Assistance to pt. Pt is currently in the program, explained that she will need to complete a new application for 6440 as year approaches. Reviewed how to complete new forms and documentation required. Pt voices understanding, will complete and drop off to our office to complete provider portion. Will then fax to [employer].

## 2023-11-08 NOTE — TELEPHONE ENCOUNTER
Patient has questions regarding her co pay assistance paperwork that I cannot answer for her. She was asking if maybe someone can give her a call back and walk her through step by step what has to be filled out and what she needs to submit? Can you help her or does this need to be directed to someone else?

## 2023-11-13 ENCOUNTER — TELEPHONE (OUTPATIENT)
Dept: RHEUMATOLOGY | Facility: CLINIC | Age: 83
End: 2023-11-13

## 2023-11-13 NOTE — TELEPHONE ENCOUNTER
Patient came to the office and dropped off her [email or web address] patient assistance program paperwork. Patient's assistance paperwork was faxed to [place] to the Nurse.

## 2023-12-11 ENCOUNTER — MED REC SCAN ONLY (OUTPATIENT)
Dept: RHEUMATOLOGY | Facility: CLINIC | Age: 83
End: 2023-12-11

## 2023-12-11 ENCOUNTER — LAB ENCOUNTER (OUTPATIENT)
Dept: LAB | Age: 83
End: 2023-12-11
Attending: INTERNAL MEDICINE
Payer: MEDICARE

## 2023-12-11 DIAGNOSIS — M05.79 RHEUMATOID ARTHRITIS INVOLVING MULTIPLE SITES WITH POSITIVE RHEUMATOID FACTOR (HCC): ICD-10-CM

## 2023-12-11 LAB
ALBUMIN SERPL-MCNC: 4.5 G/DL (ref 3.4–5)
ALBUMIN/GLOB SERPL: 1.3 {RATIO} (ref 1–2)
ALP LIVER SERPL-CCNC: 45 U/L
ALT SERPL-CCNC: 30 U/L
ANION GAP SERPL CALC-SCNC: 3 MMOL/L (ref 0–18)
AST SERPL-CCNC: 28 U/L (ref 15–37)
BASOPHILS # BLD AUTO: 0.02 X10(3) UL (ref 0–0.2)
BASOPHILS NFR BLD AUTO: 0.4 %
BILIRUB SERPL-MCNC: 0.5 MG/DL (ref 0.1–2)
BUN BLD-MCNC: 17 MG/DL (ref 9–23)
CALCIUM BLD-MCNC: 8.9 MG/DL (ref 8.5–10.1)
CHLORIDE SERPL-SCNC: 108 MMOL/L (ref 98–112)
CO2 SERPL-SCNC: 29 MMOL/L (ref 21–32)
CREAT BLD-MCNC: 0.77 MG/DL
EGFRCR SERPLBLD CKD-EPI 2021: 77 ML/MIN/1.73M2 (ref 60–?)
EOSINOPHIL # BLD AUTO: 0.11 X10(3) UL (ref 0–0.7)
EOSINOPHIL NFR BLD AUTO: 2 %
ERYTHROCYTE [DISTWIDTH] IN BLOOD BY AUTOMATED COUNT: 12.5 %
ERYTHROCYTE [SEDIMENTATION RATE] IN BLOOD: 14 MM/HR
FASTING STATUS PATIENT QL REPORTED: YES
GLOBULIN PLAS-MCNC: 3.4 G/DL (ref 2.8–4.4)
GLUCOSE BLD-MCNC: 127 MG/DL (ref 70–99)
HCT VFR BLD AUTO: 34.2 %
HGB BLD-MCNC: 11.2 G/DL
IMM GRANULOCYTES # BLD AUTO: 0.01 X10(3) UL (ref 0–1)
IMM GRANULOCYTES NFR BLD: 0.2 %
LYMPHOCYTES # BLD AUTO: 1.29 X10(3) UL (ref 1–4)
LYMPHOCYTES NFR BLD AUTO: 23.7 %
MCH RBC QN AUTO: 32.6 PG (ref 26–34)
MCHC RBC AUTO-ENTMCNC: 32.7 G/DL (ref 31–37)
MCV RBC AUTO: 99.4 FL
MONOCYTES # BLD AUTO: 0.36 X10(3) UL (ref 0.1–1)
MONOCYTES NFR BLD AUTO: 6.6 %
NEUTROPHILS # BLD AUTO: 3.66 X10 (3) UL (ref 1.5–7.7)
NEUTROPHILS # BLD AUTO: 3.66 X10(3) UL (ref 1.5–7.7)
NEUTROPHILS NFR BLD AUTO: 67.1 %
OSMOLALITY SERPL CALC.SUM OF ELEC: 293 MOSM/KG (ref 275–295)
PLATELET # BLD AUTO: 265 10(3)UL (ref 150–450)
POTASSIUM SERPL-SCNC: 4 MMOL/L (ref 3.5–5.1)
PROT SERPL-MCNC: 7.9 G/DL (ref 6.4–8.2)
RBC # BLD AUTO: 3.44 X10(6)UL
SODIUM SERPL-SCNC: 140 MMOL/L (ref 136–145)
WBC # BLD AUTO: 5.5 X10(3) UL (ref 4–11)

## 2023-12-11 PROCEDURE — 36415 COLL VENOUS BLD VENIPUNCTURE: CPT

## 2023-12-11 PROCEDURE — 85025 COMPLETE CBC W/AUTO DIFF WBC: CPT

## 2023-12-11 PROCEDURE — 80053 COMPREHEN METABOLIC PANEL: CPT

## 2023-12-11 PROCEDURE — 85652 RBC SED RATE AUTOMATED: CPT

## 2023-12-12 ENCOUNTER — TELEPHONE (OUTPATIENT)
Dept: RHEUMATOLOGY | Facility: CLINIC | Age: 83
End: 2023-12-12

## 2023-12-13 NOTE — TELEPHONE ENCOUNTER
Spoke to pt, pt will refax her application for [employer]. They were not able to read fax- too blurry. [facility] Flaquito Solo to sign.

## 2024-01-11 ENCOUNTER — TELEPHONE (OUTPATIENT)
Facility: LOCATION | Age: 84
End: 2024-01-11

## 2024-01-12 NOTE — TELEPHONE ENCOUNTER
Spoke to the patient regarding the below message:    Nothing concerning with labs.  Hemoglobin is 11 mild anemia but otherwise everything is great normal range of 12       Patient verbalized understanding and denied any questions at this time.

## 2024-01-17 ENCOUNTER — TELEPHONE (OUTPATIENT)
Dept: RHEUMATOLOGY | Facility: CLINIC | Age: 84
End: 2024-01-17

## 2024-01-17 DIAGNOSIS — M06.9 RHEUMATOID ARTHRITIS INVOLVING MULTIPLE SITES, UNSPECIFIED WHETHER RHEUMATOID FACTOR PRESENT (HCC): ICD-10-CM

## 2024-01-17 DIAGNOSIS — M06.9 RHEUMATOID ARTHRITIS INVOLVING BOTH HANDS, UNSPECIFIED WHETHER RHEUMATOID FACTOR PRESENT (HCC): ICD-10-CM

## 2024-01-17 RX ORDER — TOFACITINIB 11 MG/1
1 TABLET, FILM COATED, EXTENDED RELEASE ORAL DAILY
Qty: 30 TABLET | Refills: 11 | Status: SHIPPED | OUTPATIENT
Start: 2024-01-17

## 2024-01-17 NOTE — TELEPHONE ENCOUNTER
Called patient. Pharmacy is out of refills. The did not receive the 1 year order we sent on 12/14/23 with the assistance form. Sent electronically.

## 2024-02-05 ENCOUNTER — OFFICE VISIT (OUTPATIENT)
Dept: FAMILY MEDICINE CLINIC | Facility: CLINIC | Age: 84
End: 2024-02-05
Payer: MEDICARE

## 2024-02-05 VITALS
TEMPERATURE: 98 F | WEIGHT: 123 LBS | SYSTOLIC BLOOD PRESSURE: 160 MMHG | BODY MASS INDEX: 24.47 KG/M2 | HEART RATE: 89 BPM | HEIGHT: 59.5 IN | RESPIRATION RATE: 16 BRPM | DIASTOLIC BLOOD PRESSURE: 96 MMHG | OXYGEN SATURATION: 98 %

## 2024-02-05 DIAGNOSIS — Z78.0 POSTMENOPAUSAL: ICD-10-CM

## 2024-02-05 DIAGNOSIS — Z12.31 BREAST CANCER SCREENING BY MAMMOGRAM: ICD-10-CM

## 2024-02-05 DIAGNOSIS — H91.93 HEARING PROBLEM OF BOTH EARS: ICD-10-CM

## 2024-02-05 DIAGNOSIS — E11.9 TYPE 2 DIABETES MELLITUS WITHOUT COMPLICATION, WITHOUT LONG-TERM CURRENT USE OF INSULIN (HCC): Primary | ICD-10-CM

## 2024-02-05 DIAGNOSIS — Z00.00 WELL ADULT EXAM: ICD-10-CM

## 2024-02-05 LAB
CARTRIDGE EXPIRATION DATE: ABNORMAL DATE
CARTRIDGE LOT#: ABNORMAL NUMERIC
HEMOGLOBIN A1C: 6.8 % (ref 4.3–5.6)

## 2024-02-05 PROCEDURE — 83036 HEMOGLOBIN GLYCOSYLATED A1C: CPT | Performed by: FAMILY MEDICINE

## 2024-02-05 PROCEDURE — 99213 OFFICE O/P EST LOW 20 MIN: CPT | Performed by: FAMILY MEDICINE

## 2024-02-05 NOTE — PROGRESS NOTES
Subjective:   Wagner Walsh is a 84 year old female who presents for diabetes.        History/Other:    Chief Complaint Reviewed and Verified  No Further Nursing Notes to   Review  Tobacco Reviewed  Allergies Reviewed  Medications Reviewed    Problem List Reviewed  Medical History Reviewed  Surgical History   Reviewed  Family History Reviewed  Social History Reviewed           Diabetes  She presents for her follow-up diabetic visit. She has type 2 diabetes mellitus. Pertinent negatives for diabetes include no chest pain and no fatigue. Pertinent negatives for diabetic complications include no retinopathy. Risk factors for coronary artery disease include diabetes mellitus, dyslipidemia, hypertension and post-menopausal. Current diabetic treatment includes diet. Her breakfast blood glucose is taken between 7-8 am. Her breakfast blood glucose range is generally 110-130 mg/dl.     Pt has RA- she is on Xeljanz.     Patient presents for follow-up of elevated blood pressure. She is exercising and is adherent to a low-salt diet. Blood pressure is well controlled at home 130s/80s. Today in the office BP is high- reports that she has whitecoat.  Cardiac symptoms: none. Patient denies: chest pain, dyspnea, exertional chest pressure/discomfort, orthopnea and paroxysmal nocturnal dyspnea. Use of agents associated with hypertension: none. Patient is on losartan.     Patient has HLD- she takes crestor 4 times per week.     A1c today is 6.8 - diet controlled.       Tobacco:  She has never smoked tobacco.    Current Outpatient Medications   Medication Sig Dispense Refill    Tofacitinib Citrate ER (XELJANZ XR) 11 MG Oral Tablet 24 Hr Take 1 tablet by mouth daily. 30 tablet 11    rosuvastatin 10 MG Oral Tab Take 1 tablet (10 mg total) by mouth nightly. Pt is taking this 3 times a week only      Ascorbic Acid (VITAMIN C) 1000 MG Oral Tab Take 1 tablet (1,000 mg total) by mouth daily.      cyanocobalamin 1000 MCG Oral Tab Take  1 tablet (1,000 mcg total) by mouth daily.      ACCU-CHEK GUIDE In Vitro Strip USE 1 STRIP TO CHECK GLUCOSE ONCE DAILY 100 strip 0    ACCU-CHEK FASTCLIX LANCETS Does not apply Misc USE TO CHECK GLUCOSE ONCE DAILY AS DIRECTED 102 each 2    losartan Potassium 50 MG Oral Tab Take 1 tablet (50 mg total) by mouth nightly. 90 tablet 3    TURMERIC OR Take by mouth.      Vitamin D3 2000 units Oral Cap Take 1 capsule (2,000 Units total) by mouth daily.           Review of Systems:  Review of Systems   Constitutional:  Negative for activity change, appetite change, fatigue, fever and unexpected weight change.   HENT: Negative.     Respiratory: Negative.  Negative for cough and shortness of breath.    Cardiovascular: Negative.  Negative for chest pain.   Gastrointestinal: Negative.    Endocrine: Negative for heat intolerance.   Genitourinary: Negative.    Musculoskeletal: Negative.    Skin: Negative.    Neurological: Negative.    Psychiatric/Behavioral: Negative.            Objective:   BP (!) 160/96   Pulse 89   Temp 98 °F (36.7 °C) (Temporal)   Resp 16   Ht 4' 11.5\" (1.511 m)   Wt 123 lb (55.8 kg)   SpO2 98%   BMI 24.43 kg/m²  Estimated body mass index is 24.43 kg/m² as calculated from the following:    Height as of this encounter: 4' 11.5\" (1.511 m).    Weight as of this encounter: 123 lb (55.8 kg).  Physical Exam  Constitutional:       Appearance: She is well-developed.   HENT:      Head: Normocephalic and atraumatic.      Right Ear: Tympanic membrane and external ear normal.      Left Ear: Tympanic membrane and external ear normal.      Nose: Nose normal.      Mouth/Throat:      Pharynx: No oropharyngeal exudate.   Eyes:      Pupils: Pupils are equal, round, and reactive to light.   Neck:      Thyroid: No thyromegaly.   Cardiovascular:      Rate and Rhythm: Normal rate and regular rhythm.      Heart sounds: Normal heart sounds. No murmur heard.     No friction rub. No gallop.   Pulmonary:      Effort: Pulmonary  effort is normal. No respiratory distress.      Breath sounds: Normal breath sounds. No wheezing or rales.   Abdominal:      General: Bowel sounds are normal. There is no distension.      Palpations: Abdomen is soft.      Tenderness: There is no abdominal tenderness. There is no guarding or rebound.   Musculoskeletal:         General: Normal range of motion.      Cervical back: Neck supple.   Skin:     General: Skin is warm and dry.   Neurological:      General: No focal deficit present.      Mental Status: She is alert and oriented to person, place, and time.      Comments: Using can to ambulate     Psychiatric:         Behavior: Behavior normal.         Thought Content: Thought content normal.         Judgment: Judgment normal.          Bilateral barefoot skin diabetic exam is normal, visualized feet and the appearance is normal.  Bilateral monofilament/sensation of both feet is abnormal in few areas  Pulsation pedal pulse exam of both lower legs/feet is normal as well.  Bilateral toenails with fungus in all the nails.     Assessment & Plan:   1. Type 2 diabetes mellitus without complication, without long-term current use of insulin ([facility]) (Primary)  Last A1c value was 6.8% done 2/5/2024.  Continue with diet control. Will repeat a1c in 6 months.   -     Hemoglobin A1C; Future; Expected date: 02/05/2024  -     Microalb/Creat Ratio, Random Urine; Future; Expected date: 02/05/2024  -     POC Hgb A1C  2. Breast cancer screening by mammogram  -     [facility] DIAMOND 2D+3D SCREENING BILAT (CPT=77067/75621); Future; Expected date: 03/05/2024  3. Well adult exam  Patient to f/u for medicare visit.      5. Postmenopausal  -     XR DEXA BONE DENSITOMETRY (CPT=77080); Future; Expected date: 02/05/2024  6. Hearing problem of both ears  -     Audiology Referral - In Network  -     Audiology Referral - In Network    Patinet seeing podiatry for toenail clippings and fungus.     Return in about 3 months (around 5/5/2024) for medication  refills, physical.    Huey Madrigal DO, 2/5/2024, 1:36 PM

## 2024-02-12 ENCOUNTER — TELEPHONE (OUTPATIENT)
Dept: FAMILY MEDICINE CLINIC | Facility: CLINIC | Age: 84
End: 2024-02-12

## 2024-02-12 NOTE — TELEPHONE ENCOUNTER
Patient wants to talk to a nurse re: hearing test. Asked if she needed an appt with Dr. Madrigal and she said no. Didn't have a name of anyone to do the test.

## 2024-02-15 ENCOUNTER — TELEPHONE (OUTPATIENT)
Dept: FAMILY MEDICINE CLINIC | Facility: CLINIC | Age: 84
End: 2024-02-15

## 2024-02-15 NOTE — TELEPHONE ENCOUNTER
Patient is not going to the audiologist recommended, she has someone in [place] she's going to see. No need for a referral.

## 2024-02-22 ENCOUNTER — LAB ENCOUNTER (OUTPATIENT)
Dept: LAB | Age: 84
End: 2024-02-22
Attending: FAMILY MEDICINE
Payer: MEDICARE

## 2024-02-22 DIAGNOSIS — E11.9 TYPE 2 DIABETES MELLITUS WITHOUT COMPLICATION, WITHOUT LONG-TERM CURRENT USE OF INSULIN (HCC): ICD-10-CM

## 2024-02-22 DIAGNOSIS — M05.79 RHEUMATOID ARTHRITIS INVOLVING MULTIPLE SITES WITH POSITIVE RHEUMATOID FACTOR (HCC): ICD-10-CM

## 2024-02-22 LAB
ALBUMIN SERPL-MCNC: 4.9 G/DL (ref 3.4–5)
ALBUMIN/GLOB SERPL: 1.4 {RATIO} (ref 1–2)
ALP LIVER SERPL-CCNC: 49 U/L
ALT SERPL-CCNC: 24 U/L
ANION GAP SERPL CALC-SCNC: 4 MMOL/L (ref 0–18)
AST SERPL-CCNC: 22 U/L (ref 15–37)
BASOPHILS # BLD AUTO: 0.02 X10(3) UL (ref 0–0.2)
BASOPHILS NFR BLD AUTO: 0.4 %
BILIRUB SERPL-MCNC: 0.6 MG/DL (ref 0.1–2)
BUN BLD-MCNC: 21 MG/DL (ref 9–23)
CALCIUM BLD-MCNC: 9.3 MG/DL (ref 8.5–10.1)
CHLORIDE SERPL-SCNC: 108 MMOL/L (ref 98–112)
CHOLEST SERPL-MCNC: 192 MG/DL (ref ?–200)
CO2 SERPL-SCNC: 25 MMOL/L (ref 21–32)
CREAT BLD-MCNC: 0.69 MG/DL
CREAT UR-SCNC: 44.2 MG/DL
EGFRCR SERPLBLD CKD-EPI 2021: 86 ML/MIN/1.73M2 (ref 60–?)
EOSINOPHIL # BLD AUTO: 0.02 X10(3) UL (ref 0–0.7)
EOSINOPHIL NFR BLD AUTO: 0.4 %
ERYTHROCYTE [DISTWIDTH] IN BLOOD BY AUTOMATED COUNT: 12.4 %
ERYTHROCYTE [SEDIMENTATION RATE] IN BLOOD: 14 MM/HR
EST. AVERAGE GLUCOSE BLD GHB EST-MCNC: 151 MG/DL (ref 68–126)
FASTING PATIENT LIPID ANSWER: NO
FASTING STATUS PATIENT QL REPORTED: NO
GLOBULIN PLAS-MCNC: 3.5 G/DL (ref 2.8–4.4)
GLUCOSE BLD-MCNC: 125 MG/DL (ref 70–99)
HBA1C MFR BLD: 6.9 % (ref ?–5.7)
HCT VFR BLD AUTO: 32.4 %
HDLC SERPL-MCNC: 115 MG/DL (ref 40–59)
HGB BLD-MCNC: 11.4 G/DL
IMM GRANULOCYTES # BLD AUTO: 0.04 X10(3) UL (ref 0–1)
IMM GRANULOCYTES NFR BLD: 0.9 %
LDLC SERPL CALC-MCNC: 63 MG/DL (ref ?–100)
LYMPHOCYTES # BLD AUTO: 0.86 X10(3) UL (ref 1–4)
LYMPHOCYTES NFR BLD AUTO: 18.9 %
MCH RBC QN AUTO: 33.4 PG (ref 26–34)
MCHC RBC AUTO-ENTMCNC: 35.2 G/DL (ref 31–37)
MCV RBC AUTO: 95 FL
MICROALBUMIN UR-MCNC: 23.8 MG/DL
MICROALBUMIN/CREAT 24H UR-RTO: 538.5 UG/MG (ref ?–30)
MONOCYTES # BLD AUTO: 0.26 X10(3) UL (ref 0.1–1)
MONOCYTES NFR BLD AUTO: 5.7 %
NEUTROPHILS # BLD AUTO: 3.34 X10 (3) UL (ref 1.5–7.7)
NEUTROPHILS # BLD AUTO: 3.34 X10(3) UL (ref 1.5–7.7)
NEUTROPHILS NFR BLD AUTO: 73.7 %
NONHDLC SERPL-MCNC: 77 MG/DL (ref ?–130)
OSMOLALITY SERPL CALC.SUM OF ELEC: 288 MOSM/KG (ref 275–295)
PLATELET # BLD AUTO: 217 10(3)UL (ref 150–450)
POTASSIUM SERPL-SCNC: 3.9 MMOL/L (ref 3.5–5.1)
PROT SERPL-MCNC: 8.4 G/DL (ref 6.4–8.2)
RBC # BLD AUTO: 3.41 X10(6)UL
SODIUM SERPL-SCNC: 137 MMOL/L (ref 136–145)
TRIGL SERPL-MCNC: 79 MG/DL (ref 30–149)
VLDLC SERPL CALC-MCNC: 12 MG/DL (ref 0–30)
WBC # BLD AUTO: 4.5 X10(3) UL (ref 4–11)

## 2024-02-22 PROCEDURE — 85652 RBC SED RATE AUTOMATED: CPT

## 2024-02-22 PROCEDURE — 80053 COMPREHEN METABOLIC PANEL: CPT

## 2024-02-22 PROCEDURE — 82570 ASSAY OF URINE CREATININE: CPT

## 2024-02-22 PROCEDURE — 85025 COMPLETE CBC W/AUTO DIFF WBC: CPT

## 2024-02-22 PROCEDURE — 82043 UR ALBUMIN QUANTITATIVE: CPT

## 2024-02-22 PROCEDURE — 36415 COLL VENOUS BLD VENIPUNCTURE: CPT

## 2024-02-22 PROCEDURE — 80061 LIPID PANEL: CPT

## 2024-02-22 PROCEDURE — 83036 HEMOGLOBIN GLYCOSYLATED A1C: CPT

## 2024-03-04 ENCOUNTER — VIRTUAL PHONE E/M (OUTPATIENT)
Dept: FAMILY MEDICINE CLINIC | Facility: CLINIC | Age: 84
End: 2024-03-04
Payer: MEDICARE

## 2024-03-04 DIAGNOSIS — E11.9 TYPE 2 DIABETES MELLITUS WITHOUT COMPLICATION, WITHOUT LONG-TERM CURRENT USE OF INSULIN (HCC): Primary | ICD-10-CM

## 2024-03-04 DIAGNOSIS — D50.9 IRON DEFICIENCY ANEMIA, UNSPECIFIED IRON DEFICIENCY ANEMIA TYPE: ICD-10-CM

## 2024-03-04 PROCEDURE — 99442 PHONE E/M BY PHYS 11-20 MIN: CPT | Performed by: FAMILY MEDICINE

## 2024-03-04 NOTE — PROGRESS NOTES
VIRTUAL/TELEPHONE ENCOUNTER    Subjective    This visit is conducted using live telephone encounter with patient     Chief Complaint:  No chief complaint on file.        The patient confirmed knowledge of the limitations of the use of telemedicine were verbally confirmed by the provider.  Verification of patient identity was established.  Verbal consent was obtained for medical treatment     Patient complains of:    84-year-old female with a past medical history of type 2 diabetes, hypertension, hyperlipidemia, coronary artery disease presenting today for discussion of her labs.  Labs been reviewed with the patient she has a hemoglobin of 11.4 she has been anemic for over a year now.  Her last colonoscopy and endoscopy were in 2017 with no significant findings on the EGD.  The colonoscopy revealed hemorrhoids that were banded.  As for her blood work her A1c is 6.9-diet controlled.  She has moderate degree of microalbuminuria.  Reports excess urination which has been a chronic issue for her.  Advised patient to stop drinking fluids 2 hours prior to bedtime.  Most likely this is urinary incontinence related to age/weak pelvic floor muscles.  As for the anemia she is not currently taking any iron supplementation.  Will need to check iron labs.  Denies any blood in the stool.  Feels fine otherwise.    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      Last A1c value was 6.9% done 2/22/2024.      HISTORY:  Past Medical History:   Diagnosis Date    Arterial disease (HCC) 5/16/2018    Coronary artery calcification seen on CT scan 4/27/2018    Coronary artery disease     See Dr. Loki Ramires    Coronary artery disease involving native coronary artery of native heart without angina pectoris 5/16/2018    Dermatochalasis of both upper eyelids 7/9/2021    Essential hypertension 5/16/2018    Hyperglycemia 5/16/2018    Mixed hyperlipidemia 5/16/2018    Postmenopausal 5/16/2018    Prediabetes 5/16/2018    Ptosis of both eyelids  2021    Rheumatoid arthritis involving both hands (HCC) 2019    Rheumatoid arthritis involving multiple sites (HCC) 2018    Rheumatoid arthritis(714.0)     Type 2 diabetes mellitus without complication, without long-term current use of insulin ([facility]) 2018    Unspecified essential hypertension       Past Surgical History:   Procedure Laterality Date          CATARACT      COLONOSCOPY  2017    Dr Emeka ibarra     EGD  2017    Dr. Emeka Ibarra     ORAL SURGERY PROCEDURE        History reviewed. No pertinent family history.   Social History     Socioeconomic History    Marital status: Single   Tobacco Use    Smoking status: Never    Smokeless tobacco: Never   Vaping Use    Vaping Use: Never used   Substance and Sexual Activity    Alcohol use: No    Drug use: No   Other Topics Concern    Caffeine Concern Yes     Comment: herbal tea    Exercise No    Seat Belt Yes          ROS:   Negative.        Objective    Physical Exam:  alert and cooperative, Speaking in full sentences comfortably, and Normal work of breathing    Assessment/Plan:    1. Iron deficiency anemia, unspecified iron deficiency anemia type  Additional labs ordered as below.   Likely anemia of chronic diseas.e   - Iron And Tibc [E]; Future  - Ferritin [E]; Future    2. Type 2 diabetes mellitus without complication, without long-term current use of insulin (HCC)  Last A1c value was 6.9% done 2024.  Diet controlled  Given her age, medication not indicated.   Will recheck labs in 6 months       Diagnostic rationale, follow up instructions, and strict precautions/indications for emergent direct evaluation were discussed with the patient. The patient agrees with the plan, and understands to follow up with their primary care physician or other healthcare provider within 48-72 hours for reevaluation for persistent or worsening symptoms.    Patient understands phone evaluation is not a substitute for face-to-face  examination or emergency care. Patient advised to go to ER or call 911 for worsening symptoms or acute distress.           Virtual/Telephone Check-In    Patient  verbally consents to a Virtual/Telephone Check-In service on 3/4/24  Patient understands and accepts financial responsibility for any deductible, co-insurance and/or co-pays associated with this service.     TE done instead of ov      Duration of the service: 11 minutes were spent with the patient          Huey Madrigal DO

## 2024-03-05 ENCOUNTER — HOSPITAL ENCOUNTER (OUTPATIENT)
Dept: MAMMOGRAPHY | Age: 84
Discharge: HOME | End: 2024-03-05
Attending: FAMILY MEDICINE
Payer: MEDICARE

## 2024-03-05 ENCOUNTER — HOSPITAL ENCOUNTER (OUTPATIENT)
Dept: BONE DENSITY | Age: 84
Discharge: HOME | End: 2024-03-05
Attending: FAMILY MEDICINE
Payer: MEDICARE

## 2024-03-05 DIAGNOSIS — Z78.0 POSTMENOPAUSAL: ICD-10-CM

## 2024-03-05 DIAGNOSIS — Z12.31 BREAST CANCER SCREENING BY MAMMOGRAM: ICD-10-CM

## 2024-03-05 PROCEDURE — 77080 DXA BONE DENSITY AXIAL: CPT | Performed by: FAMILY MEDICINE

## 2024-03-05 PROCEDURE — 77067 SCR MAMMO BI INCL CAD: CPT | Performed by: FAMILY MEDICINE

## 2024-03-05 PROCEDURE — 77063 BREAST TOMOSYNTHESIS BI: CPT | Performed by: FAMILY MEDICINE

## 2024-03-07 ENCOUNTER — TELEPHONE (OUTPATIENT)
Dept: RHEUMATOLOGY | Facility: CLINIC | Age: 84
End: 2024-03-07

## 2024-03-07 NOTE — TELEPHONE ENCOUNTER
Pt called stated that she was having frequent urination and thought it was possibly a UTI. She had a phone visit with her after her urinalysis results came in, which she was told were normal and symptoms possibly age related. After the phone visit, started having rash, possible yeast/fungal infection. Wondering about medication.

## 2024-03-07 NOTE — TELEPHONE ENCOUNTER
Called pt and informed her to hold her xeljanz right now to let her immune system kick in and see urgent care or pcp for treatment. Pt verbalized understanding and will update next week so we can tell her when to go back on xeljanz

## 2024-03-11 ENCOUNTER — LAB ENCOUNTER (OUTPATIENT)
Dept: LAB | Age: 84
End: 2024-03-11
Attending: INTERNAL MEDICINE
Payer: MEDICARE

## 2024-03-11 DIAGNOSIS — D50.9 IRON DEFICIENCY ANEMIA, UNSPECIFIED IRON DEFICIENCY ANEMIA TYPE: ICD-10-CM

## 2024-03-11 DIAGNOSIS — M05.79 RHEUMATOID ARTHRITIS INVOLVING MULTIPLE SITES WITH POSITIVE RHEUMATOID FACTOR (HCC): ICD-10-CM

## 2024-03-11 LAB
ALBUMIN SERPL-MCNC: 4.5 G/DL (ref 3.4–5)
ALBUMIN/GLOB SERPL: 1.3 {RATIO} (ref 1–2)
ALP LIVER SERPL-CCNC: 44 U/L
ALT SERPL-CCNC: 25 U/L
ANION GAP SERPL CALC-SCNC: 6 MMOL/L (ref 0–18)
AST SERPL-CCNC: 23 U/L (ref 15–37)
BASOPHILS # BLD AUTO: 0.02 X10(3) UL (ref 0–0.2)
BASOPHILS NFR BLD AUTO: 0.3 %
BILIRUB SERPL-MCNC: 0.7 MG/DL (ref 0.1–2)
BUN BLD-MCNC: 19 MG/DL (ref 9–23)
CALCIUM BLD-MCNC: 9.3 MG/DL (ref 8.5–10.1)
CHLORIDE SERPL-SCNC: 106 MMOL/L (ref 98–112)
CO2 SERPL-SCNC: 27 MMOL/L (ref 21–32)
CREAT BLD-MCNC: 0.7 MG/DL
DEPRECATED HBV CORE AB SER IA-ACNC: 202.2 NG/ML
EGFRCR SERPLBLD CKD-EPI 2021: 86 ML/MIN/1.73M2 (ref 60–?)
EOSINOPHIL # BLD AUTO: 0.05 X10(3) UL (ref 0–0.7)
EOSINOPHIL NFR BLD AUTO: 0.8 %
ERYTHROCYTE [DISTWIDTH] IN BLOOD BY AUTOMATED COUNT: 12.6 %
ERYTHROCYTE [SEDIMENTATION RATE] IN BLOOD: 12 MM/HR
FASTING STATUS PATIENT QL REPORTED: YES
GLOBULIN PLAS-MCNC: 3.4 G/DL (ref 2.8–4.4)
GLUCOSE BLD-MCNC: 133 MG/DL (ref 70–99)
HCT VFR BLD AUTO: 34.3 %
HGB BLD-MCNC: 11.7 G/DL
IMM GRANULOCYTES # BLD AUTO: 0.01 X10(3) UL (ref 0–1)
IMM GRANULOCYTES NFR BLD: 0.2 %
IRON SATN MFR SERPL: 16 %
IRON SERPL-MCNC: 75 UG/DL
LYMPHOCYTES # BLD AUTO: 0.68 X10(3) UL (ref 1–4)
LYMPHOCYTES NFR BLD AUTO: 11 %
MCH RBC QN AUTO: 33.8 PG (ref 26–34)
MCHC RBC AUTO-ENTMCNC: 34.1 G/DL (ref 31–37)
MCV RBC AUTO: 99.1 FL
MONOCYTES # BLD AUTO: 0.37 X10(3) UL (ref 0.1–1)
MONOCYTES NFR BLD AUTO: 6 %
NEUTROPHILS # BLD AUTO: 5.04 X10 (3) UL (ref 1.5–7.7)
NEUTROPHILS # BLD AUTO: 5.04 X10(3) UL (ref 1.5–7.7)
NEUTROPHILS NFR BLD AUTO: 81.7 %
OSMOLALITY SERPL CALC.SUM OF ELEC: 292 MOSM/KG (ref 275–295)
PLATELET # BLD AUTO: 201 10(3)UL (ref 150–450)
POTASSIUM SERPL-SCNC: 4.1 MMOL/L (ref 3.5–5.1)
PROT SERPL-MCNC: 7.9 G/DL (ref 6.4–8.2)
RBC # BLD AUTO: 3.46 X10(6)UL
SODIUM SERPL-SCNC: 139 MMOL/L (ref 136–145)
TIBC SERPL-MCNC: 457 UG/DL (ref 240–450)
TRANSFERRIN SERPL-MCNC: 307 MG/DL (ref 200–360)
WBC # BLD AUTO: 6.2 X10(3) UL (ref 4–11)

## 2024-03-11 PROCEDURE — 85025 COMPLETE CBC W/AUTO DIFF WBC: CPT

## 2024-03-11 PROCEDURE — 82728 ASSAY OF FERRITIN: CPT

## 2024-03-11 PROCEDURE — 83550 IRON BINDING TEST: CPT

## 2024-03-11 PROCEDURE — 80053 COMPREHEN METABOLIC PANEL: CPT

## 2024-03-11 PROCEDURE — 85652 RBC SED RATE AUTOMATED: CPT

## 2024-03-11 PROCEDURE — 36415 COLL VENOUS BLD VENIPUNCTURE: CPT

## 2024-03-11 PROCEDURE — 83540 ASSAY OF IRON: CPT

## 2024-03-12 ENCOUNTER — OFFICE VISIT (OUTPATIENT)
Dept: FAMILY MEDICINE CLINIC | Facility: CLINIC | Age: 84
End: 2024-03-12
Payer: MEDICARE

## 2024-03-12 VITALS
RESPIRATION RATE: 16 BRPM | HEIGHT: 59.5 IN | TEMPERATURE: 98 F | WEIGHT: 123 LBS | SYSTOLIC BLOOD PRESSURE: 126 MMHG | DIASTOLIC BLOOD PRESSURE: 70 MMHG | HEART RATE: 80 BPM | BODY MASS INDEX: 24.47 KG/M2

## 2024-03-12 DIAGNOSIS — E78.2 MIXED HYPERLIPIDEMIA: ICD-10-CM

## 2024-03-12 DIAGNOSIS — I10 ESSENTIAL HYPERTENSION: ICD-10-CM

## 2024-03-12 DIAGNOSIS — Z00.00 ENCOUNTER FOR ANNUAL HEALTH EXAMINATION: Primary | ICD-10-CM

## 2024-03-12 DIAGNOSIS — M25.552 LEFT HIP PAIN: ICD-10-CM

## 2024-03-12 DIAGNOSIS — I25.10 CORONARY ARTERY DISEASE INVOLVING NATIVE CORONARY ARTERY OF NATIVE HEART WITHOUT ANGINA PECTORIS: ICD-10-CM

## 2024-03-12 DIAGNOSIS — M35.0C SJOGREN'S SYNDROME WITH DENTAL INVOLVEMENT (HCC): ICD-10-CM

## 2024-03-12 DIAGNOSIS — E11.9 TYPE 2 DIABETES MELLITUS WITHOUT COMPLICATION, WITHOUT LONG-TERM CURRENT USE OF INSULIN (HCC): ICD-10-CM

## 2024-03-12 DIAGNOSIS — D64.9 MILD ANEMIA: ICD-10-CM

## 2024-03-12 PROBLEM — E11.65 TYPE 2 DIABETES MELLITUS WITH HYPERGLYCEMIA, WITHOUT LONG-TERM CURRENT USE OF INSULIN (HCC): Status: RESOLVED | Noted: 2021-10-29 | Resolved: 2024-03-12

## 2024-03-12 PROBLEM — I77.9 ARTERIAL DISEASE (HCC): Status: RESOLVED | Noted: 2018-05-16 | Resolved: 2024-03-12

## 2024-03-12 PROBLEM — L25.1: Status: RESOLVED | Noted: 2021-10-29 | Resolved: 2024-03-12

## 2024-03-12 PROBLEM — M06.9 RHEUMATOID ARTHRITIS INVOLVING BOTH HANDS (HCC): Status: RESOLVED | Noted: 2019-02-11 | Resolved: 2024-03-12

## 2024-03-12 PROBLEM — M79.642 BILATERAL HAND PAIN: Status: RESOLVED | Noted: 2019-02-11 | Resolved: 2024-03-12

## 2024-03-12 PROBLEM — R29.898 WEAKNESS OF RIGHT LOWER EXTREMITY: Status: RESOLVED | Noted: 2021-08-23 | Resolved: 2024-03-12

## 2024-03-12 PROBLEM — M05.79 RHEUMATOID ARTHRITIS, SEROPOSITIVE, MULTIPLE SITES (HCC): Status: ACTIVE | Noted: 2018-05-16

## 2024-03-12 PROBLEM — M15.0 PRIMARY OSTEOARTHRITIS INVOLVING MULTIPLE JOINTS: Status: RESOLVED | Noted: 2019-02-11 | Resolved: 2024-03-12

## 2024-03-12 PROBLEM — M05.79 RHEUMATOID ARTHRITIS INVOLVING MULTIPLE SITES WITH POSITIVE RHEUMATOID FACTOR (HCC): Status: RESOLVED | Noted: 2023-09-06 | Resolved: 2024-03-12

## 2024-03-12 PROBLEM — G89.29 CHRONIC PAIN OF RIGHT LOWER EXTREMITY: Status: RESOLVED | Noted: 2021-08-23 | Resolved: 2024-03-12

## 2024-03-12 PROBLEM — M79.604 CHRONIC PAIN OF RIGHT LOWER EXTREMITY: Status: RESOLVED | Noted: 2021-08-23 | Resolved: 2024-03-12

## 2024-03-12 PROBLEM — Z78.0 POSTMENOPAUSAL: Status: RESOLVED | Noted: 2018-05-16 | Resolved: 2024-03-12

## 2024-03-12 PROBLEM — M15.9 PRIMARY OSTEOARTHRITIS INVOLVING MULTIPLE JOINTS: Status: RESOLVED | Noted: 2019-02-11 | Resolved: 2024-03-12

## 2024-03-12 PROBLEM — H52.223 REGULAR ASTIGMATISM, BILATERAL: Status: RESOLVED | Noted: 2019-10-16 | Resolved: 2024-03-12

## 2024-03-12 PROBLEM — Z28.09 INFLUENZA VACCINATION CONTRAINDICATED: Status: RESOLVED | Noted: 2019-10-16 | Resolved: 2024-03-12

## 2024-03-12 PROBLEM — R77.9 ELEVATED SERUM PROTEIN LEVEL: Status: RESOLVED | Noted: 2018-11-01 | Resolved: 2024-03-12

## 2024-03-12 PROBLEM — H25.9 AGE-RELATED CATARACT OF BOTH EYES: Status: RESOLVED | Noted: 2020-08-18 | Resolved: 2024-03-12

## 2024-03-12 PROBLEM — I77.9 ARTERIAL DISEASE: Status: RESOLVED | Noted: 2018-05-16 | Resolved: 2024-03-12

## 2024-03-12 PROBLEM — M79.641 BILATERAL HAND PAIN: Status: RESOLVED | Noted: 2019-02-11 | Resolved: 2024-03-12

## 2024-03-12 PROBLEM — K59.00 ACUTE CONSTIPATION: Status: RESOLVED | Noted: 2021-08-23 | Resolved: 2024-03-12

## 2024-03-12 NOTE — PROGRESS NOTES
Subjective:   Wagner Walsh is a 84 year old female who presents for a Medicare Subsequent Annual Wellness visit (Pt already had Initial Annual Wellness) and stable chronic illnesses (not addressed in visit).     Diabetes  She presents for her follow-up diabetic visit. She has type 2 diabetes mellitus.   Last A1c value was 6.9% done 2/22/2024.  Pertinent negatives for diabetes include no chest pain and no fatigue. no retinopathy Current diabetic treatment includes diet. Her breakfast blood glucose is taken between 7-8 am. Her breakfast blood glucose range is generally 110-130 mg/dl.  Her diabetes is diet controlled.  He is not taking any medications for this.    Pt has RA- she is on Xeljanz.     Patient presents for follow-up of elevated blood pressure. She is exercising and is adherent to a low-salt diet. Blood pressure is well controlled at home 130s/80s. Today in the office BP is high- reports that she has whitecoat.  Cardiac symptoms: none. Patient denies: chest pain, dyspnea, exertional chest pressure/discomfort, orthopnea and paroxysmal nocturnal dyspnea.  Patient is currently taking losartan 50 mg daily.    Patient has HLD- she takes crestor 4 times per week.     Patient is seeing cardiology.  She has a history of hyperlipidemia.  She had failed statins due to intolerance.  She had an allergic reaction to Zetia.  She takes Crestor currently 10 mg 4 times a week.  She denies any chest pain, palpitations, swelling, dizziness, shortness of breath.    Heart scan showed a score above 1200.  This was in 2018.    She had a Lexiscan done with her cardiologist and it was unremarkable.    A1c today is 6.8 - diet controlled.     Rheumatoid arthritis affecting multiple joints.  She is currently taking Xeljanz.  She complains of left  hip pain. Wants to travel to [place] in the summer but due to hip pain she has trouble walking for long periods. She is using cane to ambulate. Xr in 2022 revealed advanced arthritis.     Hgb  is 11.7- ferritin normal. Advised pt to consume iron rich foods. Repeat cbc in 3 months.     Mild microalbuminuria.       History/Other:   Fall Risk Assessment:   She has been screened for Falls and is High Risk. Fall Prevention information provided to patient in After Visit Summary.    Do you feel unsteady when standing or walking?: Yes  Do you worry about falling?: Yes  Have you fallen in the past year?: No     Cognitive Assessment:   Abnormal  What day of the week is this?: Correct  What month is it?: Correct  What year is it?: Correct  Recall \"Ball\": Correct  Recall \"Flag\": Incorrect  Recall \"Tree\": Correct      Functional Ability/Status:   Wagner Walsh has some abnormal functions as listed below:  She has difficulties Shopping for Groceries based on screening of functional status. She has Walking problems based on screening of functional status.       Depression Screening (PHQ-2/PHQ-9): PHQ-2 SCORE: 1  , done 3/12/2024   Feeling down, depressed, or hopeless: 1           Advanced Directives:   She does have a Living Will but we do NOT have it on file in Epic.    She has a Power of  for Health Care on file in [facility].  Discussed Advance Care Planning with patient (and family/surrogate if present). Standard forms made available to patient in After Visit Summary.      Patient Active Problem List   Diagnosis    Rheumatoid arthritis, seropositive, multiple sites (HCC)    Coronary artery disease involving native coronary artery of native heart without angina pectoris    Essential hypertension    Mixed hyperlipidemia    Type 2 diabetes mellitus without complication, without long-term current use of insulin (HCC)    Anemia    Cortical age-related cataract, bilateral    Lagophthalmos of left upper eyelid    Presbyopia    Dermatochalasis of both upper eyelids    Ptosis of both eyelids    Use of cane as ambulatory aid    Vitamin D deficiency    Sjogren's syndrome with dental involvement (HCC)     Allergies:  She is  allergic to diclofenac, penicillins, whey, statins, zetia [ezetimibe], zocor [simvastatin], colestipol, perfumes, and mold.    Current Medications:  Outpatient Medications Marked as Taking for the 3/12/24 encounter (Office Visit) with Huey Madrigal, DO   Medication Sig    Tofacitinib Citrate ER (XELJANZ XR) 11 MG Oral Tablet 24 Hr Take 1 tablet by mouth daily.    rosuvastatin 10 MG Oral Tab Take 1 tablet (10 mg total) by mouth nightly. Pt is taking this 3 times a week only    Ascorbic Acid (VITAMIN C) 1000 MG Oral Tab Take 1 tablet (1,000 mg total) by mouth daily.    cyanocobalamin 1000 MCG Oral Tab Take 1 tablet (1,000 mcg total) by mouth daily.    ACCU-CHEK GUIDE In Vitro Strip USE 1 STRIP TO CHECK GLUCOSE ONCE DAILY    ACCU-CHEK FASTCLIX LANCETS Does not apply Misc USE TO CHECK GLUCOSE ONCE DAILY AS DIRECTED    losartan Potassium 50 MG Oral Tab Take 1 tablet (50 mg total) by mouth nightly.    TURMERIC OR Take by mouth.    Vitamin D3 2000 units Oral Cap Take 1 capsule (2,000 Units total) by mouth daily.       Medical History:  She  has a past medical history of Arterial disease ([facility]) (05/16/2018), Coronary artery calcification seen on CT scan (04/27/2018), Coronary artery disease, Coronary artery disease involving native coronary artery of native heart without angina pectoris (05/16/2018), Dermatochalasis of both upper eyelids (07/09/2021), Essential hypertension (05/16/2018), Hyperglycemia (05/16/2018), Mixed hyperlipidemia (05/16/2018), Postmenopausal (05/16/2018), Prediabetes (05/16/2018), Ptosis of both eyelids (07/09/2021), Rheumatoid arthritis involving both hands ([facility]) (02/11/2019), Rheumatoid arthritis involving multiple sites ([facility]) (05/16/2018), Rheumatoid arthritis(714.0), Sjogren's syndrome with dental involvement ([facility]) (03/12/2024), Type 2 diabetes mellitus without complication, without long-term current use of insulin ([facility]) (11/01/2018), and Unspecified essential hypertension.  Surgical  History:  She  has a past surgical history that includes ; oral surgery procedure; egd (2017); colonoscopy (2017); and cataract.   Family History:  Her family history is not on file.  Social History:  She  reports that she has never smoked. She has never used smokeless tobacco. She reports that she does not drink alcohol and does not use drugs.    Tobacco:  She has never smoked tobacco.    CAGE Alcohol Screen:   CAGE screening score of 0 on 3/12/2024, showing low risk of alcohol abuse.      Patient Care Team:  Huey Madrigal DO as PCP - General (Family Medicine)  Loki Ramires MD (CARDIOLOGY)  Deana Tate DO (RHEUMATOLOGY)  Ivy Mccain PT (Physical Therapy)  Cathie Wooten MD as Referring Physician (OPHTHALMOLOGY)  Ana Peterson MD (RHEUMATOLOGY)    Review of Systems  GENERAL: feels well otherwise  SKIN: denies any unusual skin lesions  EYES: denies blurred vision or double vision  HEENT: denies nasal congestion, sinus pain or ST  LUNGS: denies shortness of breath with exertion  CARDIOVASCULAR: denies chest pain on exertion  GI: denies abdominal pain, denies heartburn  : denies dysuria, vaginal discharge or itching, no complaint of urinary incontinence        Objective:   Physical Exam  Constitutional:       Appearance: Normal appearance.   HENT:      Head: Normocephalic.      Right Ear: Tympanic membrane normal.      Left Ear: Tympanic membrane normal.   Eyes:      Conjunctiva/sclera: Conjunctivae normal.   Cardiovascular:      Rate and Rhythm: Normal rate and regular rhythm.      Heart sounds: Normal heart sounds. No murmur heard.  Pulmonary:      Effort: Pulmonary effort is normal.      Breath sounds: Normal breath sounds.   Abdominal:      General: Bowel sounds are normal.      Tenderness: There is no abdominal tenderness.   Musculoskeletal:         General: No swelling.      Cervical back: Normal range of motion.      Comments: Tender left hip with flexion and extension.     Neurological:      Mental Status: She is alert.            /70   Pulse 80   Temp 98 °F (36.7 °C) (Temporal)   Resp 16   Ht 4' 11.5\" (1.511 m)   Wt 123 lb (55.8 kg)   BMI 24.43 kg/m²  Estimated body mass index is 24.43 kg/m² as calculated from the following:    Height as of this encounter: 4' 11.5\" (1.511 m).    Weight as of this encounter: 123 lb (55.8 kg).    Medicare Hearing Assessment:   Hearing Screening    Time taken: 3/12/2024  1:50 PM  Entry User: Dariana Lay MA  Screening Method: Whisper Test  Whisper Test Result: Pass         Visual Acuity:   Right Eye Visual Acuity: Corrected Right Eye Chart Acuity: 20/20   Left Eye Visual Acuity: Corrected Left Eye Chart Acuity: 20/20   Both Eyes Visual Acuity: Corrected Both Eyes Chart Acuity: 20/20   Able To Tolerate Visual Acuity: Yes        Assessment & Plan:   Wagner Walsh is a 84 year old female who presents for a Medicare Assessment.     1. Encounter for annual health examination (Primary)  2. Mild anemia  - likely due to low iron intake. Will repeat cbc in 3 months. Advised on iron rich foods. No signs of acute bleed. No other complaints.   -     CBC With Differential With Platelet; Future; Expected date: 06/12/2024  3. Coronary artery disease involving native coronary artery of native heart without angina pectoris- continue on statin, asa  4. Essential hypertension  BP shows good control with last BP of 126/70. Continue lifestyle changes, diet, exercise and weight loss.   Last K was 4.1 done on 3/11/2024.  Last Cr was 0.7 done on 3/11/2024.  Last eGFR was 86 on 3/11/2024.  BP Meds: losartan Tabs - 50 MG     5. Sjogren's syndrome with dental involvement (HCC)- stable. Under care of Rheum.   6. Mixed hyperlipidemia- cont on statin   7. Type 2 diabetes mellitus without complication, without long-term current use of insulin (HCC)  As for her Diabetes, it is well controlled.     Recommendations are: see ophthalmology soon, check feet daily, and  repeat labs in 6 months. Currently diet controlled.  .      -     Comp Metabolic Panel (14); Future; Expected date: 09/08/2024  -     Lipid Panel; Future; Expected date: 09/08/2024  -     Hemoglobin A1C; Future; Expected date: 09/08/2024  8. Left hip pain  2/2 advanced OA   Referred to Ortho   Also recommended to start PT  -     Physical Therapy Referral - [place] Location  -     Ortho Referral - In Network  The patient indicates understanding of these issues and agrees to the plan.  Reinforced healthy diet, lifestyle, and exercise.      No follow-ups on file.     Huey Madrigal DO, 3/12/2024     Supplementary Documentation:   General Health:  In the past six months, have you lost more than 10 pounds without trying?: 2 - No  Has your appetite been poor?: No  Type of Diet: Balanced  How does the patient maintain a good energy level?: Daily Walks  How would you describe your daily physical activity?: Moderate  How would you describe your current health state?: Fair  How do you maintain positive mental well-being?: Social Interaction;Puzzles;Visiting Friends  On a scale of 0 to 10, with 0 being no pain and 10 being severe pain, what is your pain level?: 6 - (Moderate)  In the past six months, have you experienced urine leakage?: 0-No  At any time do you feel concerned for the safety/well-being of yourself and/or your children, in your home or elsewhere?: No  Have you had any immunizations at another office such as Influenza, Hepatitis B, Tetanus, or Pneumococcal?: No         [facility]'s SCREENING SCHEDULE   Tests on this list are recommended by your physician but may not be covered, or covered at this frequency, by your insurer.   Please check with your insurance carrier before scheduling to verify coverage.   PREVENTATIVE SERVICES FREQUENCY &  COVERAGE DETAILS LAST COMPLETION DATE   Diabetes Screening    Fasting Blood Sugar /  Glucose    One screening every 12 months if never tested or if previously tested but  not diagnosed with pre-diabetes   One screening every 6 months if diagnosed with pre-diabetes Lab Results   Component Value Date     (H) 03/11/2024        Cardiovascular Disease Screening    Lipid Panel  Cholesterol  Lipoprotein (HDL)  Triglycerides Covered every 5 years for all Medicare beneficiaries without apparent signs or symptoms of cardiovascular disease Lab Results   Component Value Date    CHOLEST 192 02/22/2024     (H) 02/22/2024    LDL 63 02/22/2024    LDLD 153 02/27/2018    TRIG 79 02/22/2024         Electrocardiogram (EKG)   Covered if needed at Welcome to Medicare, and non-screening if indicated for medical reasons 09/14/2014      Ultrasound Screening for Abdominal Aortic Aneurysm (AAA) Covered once in a lifetime for one of the following risk factors    Men who are 65-75 years old and have ever smoked    Anyone with a family history -     Colorectal Cancer Screening  Covered for ages 50-85; only need ONE of the following:    Colonoscopy   Covered every 10 years    Covered every 2 years if patient is at high risk or previous colonoscopy was abnormal -    No recommendations at this time    Flexible Sigmoidoscopy   Covered every 4 years -    Fecal Occult Blood Test Covered annually -   Bone Density Screening    Bone density screening    Covered every 2 years after age 65 if diagnosed with risk of osteoporosis or estrogen deficiency.    Covered yearly for long-term glucocorticoid medication use (Steroids) Last Dexa Scan:    XR DEXA BONE DENSITOMETRY (CPT=77080) 03/05/2024      No recommendations at this time   Pap and Pelvic    Pap   Covered every 2 years for women at normal risk; Annually if at high risk -  No recommendations at this time    Chlamydia Annually if high risk -  No recommendations at this time   Screening Mammogram    Mammogram     Recommend annually for all female patients aged 40 and older    One baseline mammogram covered for patients aged 35-39 03/05/2024    Health  Maintenance   Topic Date Due    Mammogram  03/05/2025       Immunizations    Influenza Covered once per flu season  Please get every year -  No recommendations at this time    Pneumococcal Each vaccine ([id number] & [id number]) covered once after 65 Prevnar 13: 05/16/2018    [id number]: 06/17/2019     No recommendations at this time    Hepatitis B One screening covered for patients with certain risk factors   -  No recommendations at this time    Tetanus Toxoid Not covered by Medicare Part B unless medically necessary (cut with metal); may be covered with your pharmacy prescription benefits -    Tetanus, Diptheria and Pertusis TD and TDaP Not covered by Medicare Part B -  No recommendations at this time    Zoster Not covered by Medicare Part B; may be covered with your pharmacy  prescription benefits -  Zoster Vaccines(1 of 2) Never done     Diabetes      Hemoglobin A1C Annually; if last result is elevated, may be asked to retest more frequently.    Medicare covers every 3 months Lab Results   Component Value Date     (H) 02/22/2024    A1C 6.9 (H) 02/22/2024       No recommendations at this time    Creat/alb ratio Annually Lab Results   Component Value Date    MICROALBCREA 538.5 (H) 02/22/2024       LDL Annually Lab Results   Component Value Date    LDL 63 02/22/2024       Dilated Eye Exam Annually Last Diabetic Eye Exam:  No data recorded  No data recorded       Annual Monitoring of Persistent Medications (ACE/ARB, digoxin diuretics, anticonvulsants)    Potassium Annually Lab Results   Component Value Date    K 4.1 03/11/2024         Creatinine   Annually Lab Results   Component Value Date    CREATSERUM 0.70 03/11/2024         BUN Annually Lab Results   Component Value Date    BUN 19 03/11/2024       Drug Serum Conc Annually No results found for: \"DIGOXIN\", \"DIG\", \"VALP\"

## 2024-03-12 NOTE — PATIENT INSTRUCTIONS
Wagner Walsh's SCREENING SCHEDULE   Tests on this list are recommended by your physician but may not be covered, or covered at this frequency, by your insurer.   Please check with your insurance carrier before scheduling to verify coverage.   PREVENTATIVE SERVICES FREQUENCY &  COVERAGE DETAILS LAST COMPLETION DATE   Diabetes Screening    Fasting Blood Sugar /  Glucose    One screening every 12 months if never tested or if previously tested but not diagnosed with pre-diabetes   One screening every 6 months if diagnosed with pre-diabetes Lab Results   Component Value Date     (H) 03/11/2024        Cardiovascular Disease Screening    Lipid Panel  Cholesterol  Lipoprotein (HDL)  Triglycerides Covered every 5 years for all Medicare beneficiaries without apparent signs or symptoms of cardiovascular disease Lab Results   Component Value Date    CHOLEST 192 02/22/2024     (H) 02/22/2024    LDL 63 02/22/2024    LDLD 153 02/27/2018    TRIG 79 02/22/2024         Electrocardiogram (EKG)   Covered if needed at Welcome to Medicare, and non-screening if indicated for medical reasons 09/14/2014      Ultrasound Screening for Abdominal Aortic Aneurysm (AAA) Covered once in a lifetime for one of the following risk factors   • Men who are 65-75 years old and have ever smoked   • Anyone with a family history -     Colorectal Cancer Screening  Covered for ages 50-85; only need ONE of the following:    Colonoscopy   Covered every 10 years    Covered every 2 years if patient is at high risk or previous colonoscopy was abnormal -    No recommendations at this time    Flexible Sigmoidoscopy   Covered every 4 years -    Fecal Occult Blood Test Covered annually -   Bone Density Screening    Bone density screening    Covered every 2 years after age 65 if diagnosed with risk of osteoporosis or estrogen deficiency.    Covered yearly for long-term glucocorticoid medication use (Steroids) Last Dexa Scan:    XR DEXA BONE DENSITOMETRY  (CPT=77080) 03/05/2024      No recommendations at this time   Pap and Pelvic    Pap   Covered every 2 years for women at normal risk; Annually if at high risk -  No recommendations at this time    Chlamydia Annually if high risk -  No recommendations at this time   Screening Mammogram    Mammogram     Recommend annually for all female patients aged 40 and older    One baseline mammogram covered for patients aged 35-39 03/05/2024    Health Maintenance   Topic Date Due   • Mammogram  03/05/2025       Immunizations    Influenza Covered once per flu season  Please get every year -  No recommendations at this time    Pneumococcal Each vaccine ([id number] & [id number]) covered once after 65 Prevnar 13: 05/16/2018    [id number]: 06/17/2019     No recommendations at this time    Hepatitis B One screening covered for patients with certain risk factors   -  No recommendations at this time    Tetanus Toxoid Not covered by Medicare Part B unless medically necessary (cut with metal); may be covered with your pharmacy prescription benefits -    Tetanus, Diptheria and Pertusis TD and TDaP Not covered by Medicare Part B -  No recommendations at this time    Zoster Not covered by Medicare Part B; may be covered with your pharmacy  prescription benefits -  Zoster Vaccines(1 of 2) Never done     Diabetes      Hemoglobin A1C Annually; if last result is elevated, may be asked to retest more frequently.    Medicare covers every 3 months Lab Results   Component Value Date     (H) 02/22/2024    A1C 6.9 (H) 02/22/2024       No recommendations at this time    Creat/alb ratio Annually Lab Results   Component Value Date    MICROALBCREA 538.5 (H) 02/22/2024       LDL Annually Lab Results   Component Value Date    LDL 63 02/22/2024       Dilated Eye Exam Annually [unfilled]     Annual Monitoring of Persistent Medications (ACE/ARB, digoxin diuretics, anticonvulsants)    Potassium Annually Lab Results   Component Value Date    K 4.1  03/11/2024         Creatinine   Annually Lab Results   Component Value Date    CREATSERUM 0.70 03/11/2024         BUN Annually Lab Results   Component Value Date    BUN 19 03/11/2024       Drug Serum Conc Annually No results found for: \"DIGOXIN\", \"DIG\", \"VALP\"

## 2024-03-13 NOTE — TELEPHONE ENCOUNTER
Patient called this evening.   Would like Dr. Peterson to know her urinary infection is much better.   Has had a lot of pain since being off Xeljanz so she will now restart.   She also saw PCP who gave her a referral for physical therapy.

## 2024-03-16 ENCOUNTER — TELEPHONE (OUTPATIENT)
Dept: FAMILY MEDICINE CLINIC | Facility: CLINIC | Age: 84
End: 2024-03-16

## 2024-04-08 ENCOUNTER — TELEPHONE (OUTPATIENT)
Dept: FAMILY MEDICINE CLINIC | Facility: CLINIC | Age: 84
End: 2024-04-08

## 2024-04-08 NOTE — TELEPHONE ENCOUNTER
Patient calling, has questions about medications. Patient was placed on medication from the dentist on gum issue. Patient wants to know if ok to take medication as directed.

## 2024-04-09 ENCOUNTER — OFFICE VISIT (OUTPATIENT)
Dept: PHYSICAL THERAPY | Age: 84
End: 2024-04-09
Attending: FAMILY MEDICINE
Payer: MEDICARE

## 2024-04-09 DIAGNOSIS — M25.552 LEFT HIP PAIN: Primary | ICD-10-CM

## 2024-04-09 PROCEDURE — 97161 PT EVAL LOW COMPLEX 20 MIN: CPT

## 2024-04-09 PROCEDURE — 97110 THERAPEUTIC EXERCISES: CPT

## 2024-04-09 NOTE — PROGRESS NOTES
LOWER EXTREMITY EVALUATION:   Referring Physician: Dr. Madrigal  Diagnosis: Left Hip OA     Date of Service: 4/9/2024     PATIENT SUMMARY   Wagner Walsh is a 84 year old y/o female who presents to therapy today with complaints of left sided hip pain originating over 5 years ago with a gradual onset and progression.  The patient's symptoms are increased with sustained stance and ambulation and lessened with rest.  She states that she has been ambulating with a single point cane since this time.  She notes increased pain with passive hip extension with gait and feels that her balance is challenged.  She states that she has not fallen.  She states that her pain is worst with quicker ambulation, transfering sit to stand, and with ascending stairs.    Pain: current 00/10, best 0/10, worst 8/10  Current functional limitations include increased pain with sustaiend stance and exercise.   Wagner describes prior level of function as unlimited despite pain. Pt goals include to have less pain and increased function through the bilateral hips..  Past medical history was reviewed with Wagner. Significant findings include  has a past medical history of Arterial disease ([facility]) (05/16/2018), Coronary artery calcification seen on CT scan (04/27/2018), Coronary artery disease, Coronary artery disease involving native coronary artery of native heart without angina pectoris (05/16/2018), Dermatochalasis of both upper eyelids (07/09/2021), Essential hypertension (05/16/2018), Hyperglycemia (05/16/2018), Mixed hyperlipidemia (05/16/2018), Postmenopausal (05/16/2018), Prediabetes (05/16/2018), Ptosis of both eyelids (07/09/2021), Rheumatoid arthritis involving both hands ([facility]) (02/11/2019), Rheumatoid arthritis involving multiple sites ([facility]) (05/16/2018), Rheumatoid arthritis(714.0), Sjogren's syndrome with dental involvement ([facility]) (03/12/2024), Type 2 diabetes mellitus without complication, without long-term current use of insulin ([facility])  (11/01/2018), and Unspecified essential hypertension.      ASSESSMENT  The patient presents with the signs and symptoms of the Rehab Diagnosis of right hip OA supported by the clinical findings of decreased ROM and pain at end range of motion, decreased MMT, decreased capsular mobility of the LE.   Wagner would benefit from skilled Physical Therapy to address the above impairments to increase functional mobility and lessen pain in general.     Precautions:  None  OBJECTIVE:   Observation: The patient presents with the structural assessment of mild forward lean through pelvis.    Gait: Decreased step and stride length bilaterally.    Palpation: Increased reactivity with palpation at the PSIS bilaterally      AROM/PROM: Hip ROM is limited with IR and Flexion bilaterally, but moreso on the right.  Presents with symmetrical ROM of the bilateral knees and ankles.    *denotes pain when testing    Accessory motion: The patient presents with moderate restrictions of the posterior and inferior glide at the hip joint(s).      Flexibility: The patient presents with moderate restrictions of bilateral iliopsoas and hamstrings.      Strength/MMT:   Hip Knee Foot/Ankle   Flexion: R 4/5; L 3/5  Extension: R 4/5; L 3/5  Abduction: R 4/5; L 4/5  ER: R 5/5; L 4/5  IR: R 5/5; L 4/5 Flexion: R 5/5; L 5/5  Extension: R 5/5; L 5/5    DF: R 5/5; L 5/5  PF: R 5/5; L 5/5  INV: R 5/5; L 5/5  EV: R 5/5; L 5/5     *denotes pain when testing    Special tests: Unable to assess due to pain.      Functional Screen: Unable to squat or stand on left side with single leg stance.      Today’s Treatment and Response:  Patient education provided on 4/9/2024 regarding the examination findings, stage of condition, and subjective/tissue reactivity.   Patient was instructed in and issued a HEP for LE flexibility to tolerance. .  The patient demonstrated safe technique with exercise completion and vocalized understanding of informational material provided.     Charges: PT Zarinaal Lo2 Complexity, TherEx 1      Total Timed Treatment: 40 min     Total Treatment Time: 40 min     In agreement with LEFS score and clinical rationale, this evaluation involved Low Complexity decision making due to 1-2 personal factors/comorbidities.    PLAN OF CARE:    Goals (to be met in 8 visits):    1. Improve upon LEFS assessment > 11% from INE to DC.  2. Patient will be aware of postural limitations and be able to correct them independently.    3. Patient will have an increase in hip mobility to equal the opposite side in order to return to sustained ambulation and exercise.    4. Patient will have an increase in hip strength to assist with returning to more normalized gait and exercise.    5. Patient will demonstrate an increase in MLT of the hip flexor and hamstring in order to return to more normalized gait and exercise.      Frequency / Duration: Patient will be seen for 2 x/week or a total of 12 visits over a 90 day period. Treatment will include: Manual Therapy; Therapeutic Exercises; Neuromuscular Re-education; Therapeutic Activity; Gait Training; Electrical Stim; Pt education; Home exercise program instructions.      Education or treatment limitation: None  Rehab Potential:good    LEFS Score  LEFS Score: 37.5 % (4/9/2024  2:12 PM)      Patient/Family/Caregiver was advised of these findings, precautions, and treatment options and has agreed to actively participate in planning and for this course of care.    Thank you for your referral. Please co-sign or sign and return this letter via fax as soon as possible to [phone]. If you have any questions, please contact me at Dept: [phone]    Sincerely,  Electronically signed by therapist: Vishal Andrews, PT, DPT, MTC, FAAOMPT    Physician's certification required: Yes  I certify the need for these services furnished under this plan of treatment and while under my care.    X___________________________________________________  Date____________________    Certification From: 4/9/2024  To:7/8/2024       8

## 2024-04-09 NOTE — TELEPHONE ENCOUNTER
Spoke to patient.   Patient went to dentist and diagnosed with gum infection.  Given sodium fluoride oral rinse which has been helping.   Advised to keep using and let dentist know if sx worsen again.     Patient asked for copy of lab results and imaging, Results placed in mail.    Advised patient on lab orders and gave reference lab info.  Scheduled 3 month f/u appt      Future Appointments   Date Time Provider Department Center   4/9/2024  1:30 PM Vishal Andrews, PT [facility] [place]   4/23/2024 11:15 AM Ivy Mccain, PT [facility] [place]   4/30/2024 12:00 PM Ivy Mccain, PT [facility] [place]   6/17/2024  1:00 PM Huey Madrigal DO [facility] [address] [facility] [address]   9/17/2024  2:00 PM Huey Madrigal DO [facility] [address] [facility] [address]

## 2024-04-10 ENCOUNTER — TELEPHONE (OUTPATIENT)
Dept: PHYSICAL THERAPY | Facility: HOSPITAL | Age: 84
End: 2024-04-10

## 2024-04-10 ENCOUNTER — TELEPHONE (OUTPATIENT)
Dept: FAMILY MEDICINE CLINIC | Facility: CLINIC | Age: 84
End: 2024-04-10

## 2024-04-10 DIAGNOSIS — M25.552 LEFT HIP PAIN: Primary | ICD-10-CM

## 2024-04-10 NOTE — TELEPHONE ENCOUNTER
Patient is having a hard time getting PT appts, with [facility]. She would like a new referral to [facility] in [place], wants to have consistent weekly appts and [facility] was not able to accommodate her.

## 2024-04-17 ENCOUNTER — TELEPHONE (OUTPATIENT)
Dept: PHYSICAL THERAPY | Facility: HOSPITAL | Age: 84
End: 2024-04-17

## 2024-04-17 ENCOUNTER — TELEPHONE (OUTPATIENT)
Dept: FAMILY MEDICINE CLINIC | Facility: CLINIC | Age: 84
End: 2024-04-17

## 2024-04-19 ENCOUNTER — APPOINTMENT (OUTPATIENT)
Dept: PHYSICAL THERAPY | Age: 84
End: 2024-04-19
Attending: FAMILY MEDICINE
Payer: MEDICARE

## 2024-04-22 ENCOUNTER — TELEPHONE (OUTPATIENT)
Dept: FAMILY MEDICINE CLINIC | Facility: CLINIC | Age: 84
End: 2024-04-22

## 2024-04-22 DIAGNOSIS — M25.512 LEFT SHOULDER PAIN, UNSPECIFIED CHRONICITY: Primary | ICD-10-CM

## 2024-04-22 NOTE — TELEPHONE ENCOUNTER
Patient requesting referral to [facility] for left shoulder, patient currently going for PT of hips. Patient will like to do additional therapy when she goes, she has appointment for tomorrow. Please advise

## 2024-04-23 ENCOUNTER — APPOINTMENT (OUTPATIENT)
Dept: PHYSICAL THERAPY | Age: 84
End: 2024-04-23
Attending: FAMILY MEDICINE
Payer: MEDICARE

## 2024-04-30 ENCOUNTER — APPOINTMENT (OUTPATIENT)
Dept: PHYSICAL THERAPY | Age: 84
End: 2024-04-30
Attending: FAMILY MEDICINE
Payer: MEDICARE

## 2024-06-12 ENCOUNTER — LAB ENCOUNTER (OUTPATIENT)
Dept: LAB | Age: 84
End: 2024-06-12
Attending: FAMILY MEDICINE
Payer: MEDICARE

## 2024-06-12 DIAGNOSIS — D64.9 MILD ANEMIA: ICD-10-CM

## 2024-06-12 LAB
BASOPHILS # BLD AUTO: 0.02 X10(3) UL (ref 0–0.2)
BASOPHILS NFR BLD AUTO: 0.4 %
EOSINOPHIL # BLD AUTO: 0.05 X10(3) UL (ref 0–0.7)
EOSINOPHIL NFR BLD AUTO: 0.9 %
ERYTHROCYTE [DISTWIDTH] IN BLOOD BY AUTOMATED COUNT: 12.9 %
HCT VFR BLD AUTO: 36.2 %
HGB BLD-MCNC: 12 G/DL
IMM GRANULOCYTES # BLD AUTO: 0.02 X10(3) UL (ref 0–1)
IMM GRANULOCYTES NFR BLD: 0.4 %
LYMPHOCYTES # BLD AUTO: 1.35 X10(3) UL (ref 1–4)
LYMPHOCYTES NFR BLD AUTO: 23.8 %
MCH RBC QN AUTO: 32.8 PG (ref 26–34)
MCHC RBC AUTO-ENTMCNC: 33.1 G/DL (ref 31–37)
MCV RBC AUTO: 98.9 FL
MONOCYTES # BLD AUTO: 0.42 X10(3) UL (ref 0.1–1)
MONOCYTES NFR BLD AUTO: 7.4 %
NEUTROPHILS # BLD AUTO: 3.82 X10 (3) UL (ref 1.5–7.7)
NEUTROPHILS # BLD AUTO: 3.82 X10(3) UL (ref 1.5–7.7)
NEUTROPHILS NFR BLD AUTO: 67.1 %
PLATELET # BLD AUTO: 261 10(3)UL (ref 150–450)
RBC # BLD AUTO: 3.66 X10(6)UL
WBC # BLD AUTO: 5.7 X10(3) UL (ref 4–11)

## 2024-06-12 PROCEDURE — 36415 COLL VENOUS BLD VENIPUNCTURE: CPT

## 2024-06-12 PROCEDURE — 85025 COMPLETE CBC W/AUTO DIFF WBC: CPT

## 2024-06-17 ENCOUNTER — OFFICE VISIT (OUTPATIENT)
Dept: FAMILY MEDICINE CLINIC | Facility: CLINIC | Age: 84
End: 2024-06-17
Payer: MEDICARE

## 2024-06-17 VITALS
BODY MASS INDEX: 24.27 KG/M2 | WEIGHT: 122 LBS | RESPIRATION RATE: 16 BRPM | SYSTOLIC BLOOD PRESSURE: 170 MMHG | HEART RATE: 92 BPM | HEIGHT: 59.5 IN | TEMPERATURE: 98 F | DIASTOLIC BLOOD PRESSURE: 78 MMHG

## 2024-06-17 DIAGNOSIS — I10 ESSENTIAL HYPERTENSION: Primary | ICD-10-CM

## 2024-06-17 DIAGNOSIS — M16.10 HIP ARTHRITIS: ICD-10-CM

## 2024-06-17 DIAGNOSIS — E11.9 TYPE 2 DIABETES MELLITUS WITHOUT COMPLICATION, WITHOUT LONG-TERM CURRENT USE OF INSULIN (HCC): ICD-10-CM

## 2024-06-17 PROCEDURE — 99213 OFFICE O/P EST LOW 20 MIN: CPT | Performed by: FAMILY MEDICINE

## 2024-06-17 PROCEDURE — G2211 COMPLEX E/M VISIT ADD ON: HCPCS | Performed by: FAMILY MEDICINE

## 2024-06-17 NOTE — PATIENT INSTRUCTIONS
Monitor your blood pressure once daily at the same time every morning.   Follow up in 1 week via phone visit.

## 2024-06-17 NOTE — PROGRESS NOTES
HPI:   Subjective   Chief Complaint   Patient presents with    Follow - Up      Wagner Walsh is a 84 year old female who presents for follow-up of elevated blood pressure. Blood pressure is well controlled at home rpeorts that her BP at home is 120-130/80s. Cardiac symptoms: none. Patient denies: chest pain, dyspnea, exertional chest pressure/discomfort, headache, vision change, orthopnea, and paroxysmal nocturnal dyspnea. She is on losartan 50mg daily.  SHe reports eating unhealthy last couple days and traveled to city recently. Also reports being hot and tired today.     Patient completed 8 sessions of PT for hip pain. She is is using cane for ambulation. She has hx of RA and has occasional flares in her left hip. She is also seeing chiropractor. She would like to see Pain management.      Hypertension Labs   Lab Results   Component Value Date    CREATSERUM 0.70 03/11/2024     09/12/2014    K 4.1 03/11/2024    CHOLEST 192 02/22/2024    CHOLEST 268 (H) 02/28/2022    CHOLEST 183 11/11/2021     (H) 02/22/2024     (H) 02/28/2022    HDL 68 (H) 11/11/2021    LDL 63 02/22/2024     (H) 02/28/2022    LDL 94 11/11/2021    TRIG 79 02/22/2024    TRIG 119 02/28/2022    TRIG 121 11/11/2021        Wt Readings from Last 3 Encounters:   06/17/24 122 lb (55.3 kg)   03/12/24 123 lb (55.8 kg)   02/05/24 123 lb (55.8 kg)     BP Readings from Last 3 Encounters:   06/17/24 (!) 170/78   03/12/24 126/70   02/05/24 (!) 160/96          Past History:   The following portions of the patient's history were reviewed in this encounter and updated as appropriate:  Chief Complaint Reviewed and Verified  No Further Nursing Notes to   Review  Tobacco Reviewed  Allergies Reviewed  Medications Reviewed    Problem List Reviewed  Medical History Reviewed  Surgical History   Reviewed  Family History Reviewed          She  has a past medical history of Arterial disease (HCC) (05/16/2018), Coronary artery calcification  seen on CT scan (04/27/2018), Coronary artery disease, Coronary artery disease involving native coronary artery of native heart without angina pectoris (05/16/2018), Dermatochalasis of both upper eyelids (07/09/2021), Essential hypertension (05/16/2018), Hyperglycemia (05/16/2018), Mixed hyperlipidemia (05/16/2018), Postmenopausal (05/16/2018), Prediabetes (05/16/2018), Ptosis of both eyelids (07/09/2021), Rheumatoid arthritis involving both hands ([facility]) (02/11/2019), Rheumatoid arthritis involving multiple sites ([facility]) (05/16/2018), Rheumatoid arthritis(714.0), Sjogren's syndrome with dental involvement ([facility]) (03/12/2024), Type 2 diabetes mellitus without complication, without long-term current use of insulin ([facility]) (11/01/2018), and Unspecified essential hypertension.   Her family history is not on file.   She  reports that she has never smoked. She has never used smokeless tobacco. She reports that she does not drink alcohol and does not use drugs.     She is allergic to diclofenac, penicillins, whey, statins, zetia [ezetimibe], zocor [simvastatin], colestipol, perfumes, and mold.     Current Outpatient Medications on File Prior to Visit   Medication Sig    Tofacitinib Citrate ER (XELJANZ XR) 11 MG Oral Tablet 24 Hr Take 1 tablet by mouth daily.    rosuvastatin 10 MG Oral Tab Take 1 tablet (10 mg total) by mouth nightly. Pt is taking this 3 times a week only    Ascorbic Acid (VITAMIN C) 1000 MG Oral Tab Take 1 tablet (1,000 mg total) by mouth daily.    cyanocobalamin 1000 MCG Oral Tab Take 1 tablet (1,000 mcg total) by mouth daily.    ACCU-CHEK GUIDE In Vitro Strip USE 1 STRIP TO CHECK GLUCOSE ONCE DAILY    ACCU-CHEK FASTCLIX LANCETS Does not apply Misc USE TO CHECK GLUCOSE ONCE DAILY AS DIRECTED    losartan Potassium 50 MG Oral Tab Take 1 tablet (50 mg total) by mouth nightly.    TURMERIC OR Take by mouth.    Vitamin D3 2000 units Oral Cap Take 1 capsule (2,000 Units total) by mouth daily.     No current  facility-administered medications on file prior to visit.          REVIEW OF SYSTEMS:   GENERAL HEALTH: feels well otherwise    EXAM:   BP (!) 170/78   Pulse 92   Temp 98.1 °F (36.7 °C) (Temporal)   Resp 16   Ht 4' 11.5\" (1.511 m)   Wt 122 lb (55.3 kg)   BMI 24.23 kg/m²  Estimated body mass index is 24.23 kg/m² as calculated from the following:    Height as of this encounter: 4' 11.5\" (1.511 m).    Weight as of this encounter: 122 lb (55.3 kg).   GENERAL: well developed, well nourished,in no apparent distress  LUNGS: clear to auscultation  CARDIO: RRR without murmur  GI: good BS's,no masses, HSM or tenderness  EXTREMITIES: no edema  Antalgic gait due to left hip pain. Using walker.   NEURO: A&O to person place and time.   No anhedonia, nor loss of hope.    ASSESSMENT:   See encounter diagnosis  Discussion: Stage 2 (systolic =160 mmHg or diastolic =100 mmHg)  Cardiovascular risk factors: advanced age (older than 55 for men, 65 for women), dyslipidemia, and hypertension  Evidence of target organ damage: none.    PLAN:      1. Type 2 diabetes mellitus without complication, without long-term current use of insulin (HCC)  Last A1c value was 6.9% done 2/22/2024.  F/u in 3 months  Diet controlled.   - Microalb/Creat Ratio, Random Urine [E]; Future    2. Hip arthritis  Hx of RA.   Compelted 8 sessions f PT still has a lot of pain in the left hip that periodically fares.   - Pain Management Referral - In Network  - Walker ([facility]ator)    Elevated Blood pressure noted.  Goal BP is SBP<140, DBP<90      Patient monitors BP at home and readings have been normal   Patient to arrange follow up  within the next week - we can do virtual phone visit to check her home readings. She reports it is never up to 170 at home. This it may be due to the heat or her salty diet this past week.     Continue current treatment regimen- losartan 50 mg daily for now.   Dietary Sodium Restriction            Patient Education: Reviewed risks of  hypertension and principles of treatment.            Return in about 1 week (around 6/24/2024) for blood pressure.   Huey Madrigal DO, 6/17/2024, 1:17 PM

## 2024-06-20 ENCOUNTER — LAB ENCOUNTER (OUTPATIENT)
Dept: LAB | Age: 84
End: 2024-06-20
Attending: INTERNAL MEDICINE

## 2024-06-20 DIAGNOSIS — M05.79 RHEUMATOID ARTHRITIS INVOLVING MULTIPLE SITES WITH POSITIVE RHEUMATOID FACTOR (HCC): ICD-10-CM

## 2024-06-20 LAB
ALBUMIN SERPL-MCNC: 4.7 G/DL (ref 3.4–5)
ALBUMIN/GLOB SERPL: 1.4 {RATIO} (ref 1–2)
ALP LIVER SERPL-CCNC: 47 U/L
ALT SERPL-CCNC: 33 U/L
ANION GAP SERPL CALC-SCNC: 8 MMOL/L (ref 0–18)
AST SERPL-CCNC: 24 U/L (ref 15–37)
BASOPHILS # BLD AUTO: 0.02 X10(3) UL (ref 0–0.2)
BASOPHILS NFR BLD AUTO: 0.4 %
BILIRUB SERPL-MCNC: 0.6 MG/DL (ref 0.1–2)
BUN BLD-MCNC: 17 MG/DL (ref 9–23)
CALCIUM BLD-MCNC: 8.8 MG/DL (ref 8.5–10.1)
CHLORIDE SERPL-SCNC: 106 MMOL/L (ref 98–112)
CO2 SERPL-SCNC: 25 MMOL/L (ref 21–32)
CREAT BLD-MCNC: 0.81 MG/DL
EGFRCR SERPLBLD CKD-EPI 2021: 72 ML/MIN/1.73M2 (ref 60–?)
EOSINOPHIL # BLD AUTO: 0.03 X10(3) UL (ref 0–0.7)
EOSINOPHIL NFR BLD AUTO: 0.6 %
ERYTHROCYTE [DISTWIDTH] IN BLOOD BY AUTOMATED COUNT: 12.7 %
ERYTHROCYTE [SEDIMENTATION RATE] IN BLOOD: 10 MM/HR
FASTING STATUS PATIENT QL REPORTED: YES
GLOBULIN PLAS-MCNC: 3.4 G/DL (ref 2.8–4.4)
GLUCOSE BLD-MCNC: 138 MG/DL (ref 70–99)
HCT VFR BLD AUTO: 33.5 %
HGB BLD-MCNC: 11.9 G/DL
IMM GRANULOCYTES # BLD AUTO: 0.02 X10(3) UL (ref 0–1)
IMM GRANULOCYTES NFR BLD: 0.4 %
LYMPHOCYTES # BLD AUTO: 0.98 X10(3) UL (ref 1–4)
LYMPHOCYTES NFR BLD AUTO: 21.2 %
MCH RBC QN AUTO: 33.7 PG (ref 26–34)
MCHC RBC AUTO-ENTMCNC: 35.5 G/DL (ref 31–37)
MCV RBC AUTO: 94.9 FL
MONOCYTES # BLD AUTO: 0.25 X10(3) UL (ref 0.1–1)
MONOCYTES NFR BLD AUTO: 5.4 %
NEUTROPHILS # BLD AUTO: 3.33 X10 (3) UL (ref 1.5–7.7)
NEUTROPHILS # BLD AUTO: 3.33 X10(3) UL (ref 1.5–7.7)
NEUTROPHILS NFR BLD AUTO: 72 %
OSMOLALITY SERPL CALC.SUM OF ELEC: 292 MOSM/KG (ref 275–295)
PLATELET # BLD AUTO: 232 10(3)UL (ref 150–450)
POTASSIUM SERPL-SCNC: 4.2 MMOL/L (ref 3.5–5.1)
PROT SERPL-MCNC: 8.1 G/DL (ref 6.4–8.2)
RBC # BLD AUTO: 3.53 X10(6)UL
SODIUM SERPL-SCNC: 139 MMOL/L (ref 136–145)
WBC # BLD AUTO: 4.6 X10(3) UL (ref 4–11)

## 2024-06-20 PROCEDURE — 36415 COLL VENOUS BLD VENIPUNCTURE: CPT

## 2024-06-20 PROCEDURE — 85025 COMPLETE CBC W/AUTO DIFF WBC: CPT

## 2024-06-20 PROCEDURE — 85652 RBC SED RATE AUTOMATED: CPT

## 2024-06-20 PROCEDURE — 80053 COMPREHEN METABOLIC PANEL: CPT

## 2024-06-25 ENCOUNTER — TELEPHONE (OUTPATIENT)
Dept: RHEUMATOLOGY | Facility: CLINIC | Age: 84
End: 2024-06-25

## 2024-06-25 DIAGNOSIS — D64.9 ANEMIA, UNSPECIFIED TYPE: Primary | ICD-10-CM

## 2024-06-25 NOTE — TELEPHONE ENCOUNTER
Anemia noted- start her on ferrous sulfate 325mg daily for 6 weeks, recheck cbc ferritin iron/tibc in 6 weeks.

## 2024-06-25 NOTE — TELEPHONE ENCOUNTER
Spoke to patient regarding the below message:    Mild anemia hemoglobin 11 otherwise labs look great no concerning findings    Please fax to PCP office to follow-up    Patient verbalized understanding and denied any questions at this time. She would like a copy of her lab results mailed to her home. Copy mailed to patient's home. Patient's lab results sent to patient's PCP

## 2024-06-26 RX ORDER — FERROUS SULFATE 325(65) MG
325 TABLET ORAL
Qty: 90 TABLET | Refills: 0 | Status: SHIPPED | OUTPATIENT
Start: 2024-06-26

## 2024-07-03 ENCOUNTER — OFFICE VISIT (OUTPATIENT)
Dept: PAIN CLINIC | Facility: CLINIC | Age: 84
End: 2024-07-03
Payer: MEDICARE

## 2024-07-03 ENCOUNTER — TELEPHONE (OUTPATIENT)
Dept: PAIN CLINIC | Facility: CLINIC | Age: 84
End: 2024-07-03

## 2024-07-03 VITALS
OXYGEN SATURATION: 95 % | WEIGHT: 122 LBS | HEART RATE: 55 BPM | BODY MASS INDEX: 24 KG/M2 | SYSTOLIC BLOOD PRESSURE: 134 MMHG | DIASTOLIC BLOOD PRESSURE: 76 MMHG

## 2024-07-03 DIAGNOSIS — M16.12 PRIMARY OSTEOARTHRITIS OF LEFT HIP: Primary | ICD-10-CM

## 2024-07-03 DIAGNOSIS — M05.79 RHEUMATOID ARTHRITIS, SEROPOSITIVE, MULTIPLE SITES (HCC): ICD-10-CM

## 2024-07-03 PROCEDURE — 99204 OFFICE O/P NEW MOD 45 MIN: CPT | Performed by: ANESTHESIOLOGY

## 2024-07-03 RX ORDER — NEOMYCIN SULFATE, POLYMYXIN B SULFATE, AND DEXAMETHASONE 3.5; 10000; 1 MG/G; [USP'U]/G; MG/G
OINTMENT OPHTHALMIC
COMMUNITY
Start: 2024-06-19

## 2024-07-03 NOTE — H&P
Name: Wagner Walsh   : 1940   DOS: 7/3/2024     Chief complaint: Hip pain    History of present illness:  Wagner Walsh is a 84 year old female with a history of coronary artery disease, rheumatoid arthritis, Sjogren's, and diabetes who presents today for evaluation of left hip pain.  Patient has known end-stage osteoarthritis of the left hip based upon  imaging.  There is bone-on-bone appearance with cystic changes of the femoral head.  The patient complains of pain in the left hip which is described as throbbing and stabbing.  This is made worse by standing and changing position.  Rates the pain as 7 out of 10 with exacerbation and 9 out of 10.  Has been recommended for hip replacement but patient is apprehensive to undergo surgery.  Referred to discuss injection therapies..      Past Medical History:    Arterial disease (HCC)    Coronary artery calcification seen on CT scan    Coronary artery disease    See Dr. Loki Ramires    Coronary artery disease involving native coronary artery of native heart without angina pectoris    Dermatochalasis of both upper eyelids    Essential hypertension    Hyperglycemia    Mixed hyperlipidemia    Postmenopausal    Prediabetes    Ptosis of both eyelids    Rheumatoid arthritis involving both hands (HCC)    Rheumatoid arthritis involving multiple sites (HCC)    Rheumatoid arthritis(714.0)    Sjogren's syndrome with dental involvement (HCC)    Type 2 diabetes mellitus without complication, without long-term current use of insulin (HCC)    Unspecified essential hypertension      Current Outpatient Medications   Medication Sig Dispense Refill    neomycin-polymyxin-dexamethasone [id number] Ophthalmic Ointment APPLY A SMALL AMOUNT OF OINTMENT INTO EACH EYE NIGHTLY AT BEDTIME      Ferrous Sulfate 325 (65 Fe) MG Oral Tab Take 1 tablet (325 mg total) by mouth daily with breakfast. 90 tablet 0    Tofacitinib Citrate ER (XELJANZ XR) 11 MG Oral Tablet 24 Hr Take 1 tablet by  mouth daily. 30 tablet 11    rosuvastatin 10 MG Oral Tab Take 1 tablet (10 mg total) by mouth nightly. Pt is taking this 3 times a week only      Ascorbic Acid (VITAMIN C) 1000 MG Oral Tab Take 1 tablet (1,000 mg total) by mouth daily.      cyanocobalamin 1000 MCG Oral Tab Take 1 tablet (1,000 mcg total) by mouth daily.      ACCU-CHEK GUIDE In Vitro Strip USE 1 STRIP TO CHECK GLUCOSE ONCE DAILY 100 strip 0    ACCU-CHEK FASTCLIX LANCETS Does not apply Misc USE TO CHECK GLUCOSE ONCE DAILY AS DIRECTED 102 each 2    losartan Potassium 50 MG Oral Tab Take 1 tablet (50 mg total) by mouth nightly. 90 tablet 3    TURMERIC OR Take by mouth.      Vitamin D3 2000 units Oral Cap Take 1 capsule (2,000 Units total) by mouth daily.       Past Surgical History:   Procedure Laterality Date          Cataract      Colonoscopy  2017    Dr Emeka ibarra     Egd  2017    Dr. Emeka Ibarra     Oral surgery procedure        No family history on file.  Social History     Socioeconomic History    Marital status: Single   Tobacco Use    Smoking status: Never    Smokeless tobacco: Never   Vaping Use    Vaping status: Never Used   Substance and Sexual Activity    Alcohol use: No    Drug use: No   Other Topics Concern    Caffeine Concern Yes     Comment: herbal tea    Exercise No    Seat Belt Yes     Social Determinants of Health     Physical Activity: Unknown (3/7/2022)    [facility] [employer] [employer]    Exercise Vital Sign     Days of Exercise per Week: 0 days       Review of  other systems:  10 point ROS otherwise negative    Physical examination: Wagner is a 84 year old female not in acute distress  /76 (BP Location: Left arm, Patient Position: Sitting)   Pulse 55   Wt 122 lb (55.3 kg)   SpO2 95%   BMI 24.23 kg/m²    The patient is awake, alert, oriented and corporative. She has a normal affect. The patient ambulates with a cane.  HEENT: No gross lesion noted. PEERL. No  icterus.  Neck and Upper Extremity: Supple. No thyromegaly or lymphadenopathy.  Bilateral upper EXTR and range of motion is intact.  Fine and gross motor intact  Cardiovascular system: No peripheral edema.  Respiratory system: Patient is nonlabored  Abdomen: Soft  Neurologic:  Cranial nerves II through XII are grossly intact.       Examination of the lower back: Gait is intact with cane  Radiology diagnostic studies:   Hip x-ray from 2022 reviewed.  There is marked joint space narrowing the left hip with bone-on-bone appearance and cystic degenerative changes of the femoral head.  There is flattening the femoral head    Assessment:  Encounter Diagnoses   Name Primary?    Primary osteoarthritis of left hip Yes    Rheumatoid arthritis, seropositive, multiple sites (HCC)    .      Plan:     The patient is a pleasant 84-year-old female who presents today for evaluation of hip pain.  The patient complains of pain in the left hip which is made worse by changing position.  Has known end-stage osteoarthritis with cystic changes and flattening of the femoral head.  Discussed with patient that definitive therapy would be total hip replacement.  However, patient is quite apprehensive about surgery.  Discussed injection therapies including corticosteroid injection and possible PRP injection.  Would not recommend viscosupplement into the hip joint.  Patient will follow-up after corticosteroid injection.    Bolivar Negron MD MPH  Pain Management

## 2024-07-03 NOTE — PROGRESS NOTES
Location of Pain:  left hip, left shoulder, bilateral thigh     Date Pain Began: 10+ years          Work Related:   No        Receiving Work Comp/Disability:   No    Numeric Rating Scale:  Pain at Present:  7                                                                                                            (No Pain) 0  to  10 (Worst Pain)                 Minimum Pain:   6  Maximum Pain  9    Distribution of Pain:    left    Quality of Pain:    Throbbing, stabbing,c ramping, numbness, tingling    Origin of Pain:    Disease related    Aggravating Factors:    Standing    Past Treatments for Current Pain Condition:   Other NA    Prior diagnostic testing for your pain:  Xray

## 2024-07-03 NOTE — TELEPHONE ENCOUNTER
Order Questions    Question Answer   Anesthesia Type Local   Provider Jamil   [facility] Procedure Lab   CPT (Hit enter after each entry) XR ASPIR/INJ MAJOR JOINT W/FLUORO RT(CPT=77002/03267)   Medical clearance requested (will send to Pain Navigator) No   Patient has Medicare coverage? Yes   Comments (Please list entire procedure name here.) left hip injection

## 2024-07-03 NOTE — PATIENT INSTRUCTIONS
Refill policies:    Allow 2-3 business days for refills; controlled substances may take longer.  Contact your pharmacy at least 5 days prior to running out of medication and have them send an electronic request or submit request through the “request refill” option in your [employer] account.  Refills are not addressed on weekends; covering physicians do not authorize routine medications on weekends.  No narcotics or controlled substances are refilled after noon on Fridays or by on call physicians.  By law, narcotics must be electronically prescribed.  A 30 day supply with no refills is the maximum allowed.  If your prescription is due for a refill, you may be due for a follow up appointment.  To best provide you care, patients receiving routine medications need to be seen at least once a year.  Patients receiving narcotic/controlled substance medications need to be seen at least once every 3 months.  In the event that your preferred pharmacy does not have the requested medication in stock (e.g. Backordered), it is your responsibility to find another pharmacy that has the requested medication available.  We will gladly send a new prescription to that pharmacy at your request.    Scheduling Tests:    If your physician has ordered radiology tests such as MRI or CT scans, please contact Central Scheduling at [phone] right away to schedule the test.  Once scheduled, the [facility] Centralized Referral Team will work with your insurance carrier to obtain pre-certification or prior authorization.  Depending on your insurance carrier, approval may take 3-10 days.  It is highly recommended patients assure they have received an authorization before having a test performed.  If test is done without insurance authorization, patient may be responsible for the entire amount billed.      Precertification and Prior Authorizations:  If your physician has recommended that you have a procedure or additional testing performed the [facility]  Centralized Referral Team will contact your insurance carrier to obtain pre-certification or prior authorization.    You are strongly encouraged to contact your insurance carrier to verify that your procedure/test has been approved and is a COVERED benefit.  Although the [facility] Centralized Referral Team does its due diligence, the insurance carrier gives the disclaimer that \"Although the procedure is authorized, this does not guarantee payment.\"    Ultimately the patient is responsible for payment.   Thank you for your understanding in this matter.  Paperwork Completion:  If you require FMLA or disability paperwork for your recovery, please make sure to either drop it off or have it faxed to our office at [phone]. Be sure the form has your name and date of birth on it.  The form will be faxed to our Forms Department and they will complete it for you.  There is a 25$ fee for all forms that need to be filled out.  Please be aware there is a 10-14 day turnaround time.  You will need to sign a release of information (MARCOS) form if your paperwork does not come with one.  You may call the Forms Department with any questions at [phone].  Their fax number is [phone].

## 2024-07-03 NOTE — PROGRESS NOTES
Patient presents in office today with reported pain in left hip, left shoulder, bilateral thigh    Current pain level reported = 7/10    Last reported dose of Ibuprofen 200mg today    Narcotic Contract renewal NA  Urine Drug screen NA

## 2024-07-03 NOTE — TELEPHONE ENCOUNTER
Patient advised of insurance approval to proceed with injections and is agreeable to scheduling. Patient scheduled for procedure, pre-procedure instructions reviewed. Patient prefers Local sedation. Reviewed sedation instructions including No Fasting & No  Required. Patient encouraged but not required to hold Ibuprofen for 24 hours prior to procedure. Patient mentioned she only takes Turmeric Tea. Patient verbalized understanding of instructions, no further needs at this time.      Pre Procedure Instruction Sheet provided to patient at office visit.       [facility]  PRE-PROCEDURE INSTRUCTIONS WITHOUT SEDATION    Procedure: Left Hip Injection        Appointment Date: 07/18/2024  Check-In Time: 12:30 PM    Follow-Up Date/Time w/ : 07/31/2024 @ 11:45 AM    Prior to the procedure:  Please update us prior to the procedure if you are experiencing any symptoms of infection such as cough, fever, chills, urinary symptoms, or have recently been prescribed antibiotics, have open wounds, have recently had surgery or dental procedures.    Day of Procedure:  **Drivers will be required for patients who receive prescriptions for Valium.    NO FASTING REQUIRED  Please bring your Insurance Card, Photo ID, List of Current Medications and Referral (if applicable) to your appointment.  Please park in the [employer] and follow the signs to the [facility].  Check in at [facility] ([address] [place] [address]) outpatient registration in the [employer].  Please note-No prescriptions will be written by Pain Clinic in OR on the day of procedure. If you require a refill of medications, please contact the office 48 hours prior to your procedure.  If you have an implanted Spinal Cord or Peripheral Nerve Stimulator: Please remember to turn device off for procedure.        Medication Hold:    Number of days you need to be off for the following medications:    Aggrenox 10 days   Agrylin  (Anagrelide) 10 days  Brilinta (Ticagrelor) 7 days  Imbruvica (Ibrutinib) 3 days   Enbrel (Etanercept) 24 hours   Fragmin (Dalteparin) 24 hours   Pletal (Cilostazol) 7 days  Effient (Prasugrel) 7 days  Pradaxa 10 days  Trental 7 days  Eliquis (Apixaban) 3 days  Xarelto (Rivaroxaban) 3 days  Lovenox (Enoxaparin) 24 hours  Aspirin  Greater than 81mg but less than 325mg   5 days  325mg and greater                  7 days  Coumadin       5 days  Procedure may be cancelled if INR is elevated.   Excedrin (with aspirin) 7 days  Plavix (Clopidogrel)                            7 days    NSAIDs: 24 hours preferred      Ibuprofen (Motrin, Advil, Vicoprofen), Naproxen (Naprosyn, Aleve), Piroxcam (Feldene), Meloxicam (Mobic), Oxaprozin (Daypro), Diclofenac (Voltaren), Indomethacin (Indocin), Etodolac (Lodine), Nabumetone (Relafen), Celebrex (Celecoxib)           HERBAL SUPPLEMENTS  5 days preferred  Fish oil, krill oil, Omega-3, Vascepa, Vitamin E, Turmeric, Garlic                       Insurance Authorization:   Most insurances are now requiring a preauthorization for all procedures.  In the event that your insurance does not authorize your procedure within 48 hours of the scheduled date, your procedure will be cancelled and rescheduled to a later date.  Please contact your insurance carrier to determine what your financial responsibility will be for the procedure(s).      Cancellation/Rescheduling Appointment:   In the event you need to cancel or reschedule your appointment, you must notify the office 24 hours prior.    Post-procedure instructions:        Please schedule a follow up visit within 2 to 4 weeks after your last procedure date   Please call our office with any questions or concerns before or after your procedure at  [phone].  If you are a diabetic, please increase the frequency of your glucose monitoring after the procedure as this may cause a temporary increase in your blood sugar.  Contact your primary care  physician if your blood sugar rises as you may require some medication adjustment.  It is normal to have increased pain at injection site for up to 3-5 days after procedure, you can use heat or ice (20 minutes on 20 minutes off) for comfort.    **To hear a recorded version of these instructions, please call [phone] and follow the prompts.  **Para escuchar las instrucciones en Español, por favor de llamar el jose daniel [phone] opción 4.

## 2024-07-18 ENCOUNTER — APPOINTMENT (OUTPATIENT)
Dept: GENERAL RADIOLOGY | Facility: HOSPITAL | Age: 84
End: 2024-07-18
Attending: ANESTHESIOLOGY
Payer: MEDICARE

## 2024-07-18 ENCOUNTER — HOSPITAL ENCOUNTER (OUTPATIENT)
Facility: HOSPITAL | Age: 84
Setting detail: HOSPITAL OUTPATIENT SURGERY
Discharge: HOME | End: 2024-07-18
Attending: ANESTHESIOLOGY | Admitting: ANESTHESIOLOGY
Payer: MEDICARE

## 2024-07-18 VITALS
TEMPERATURE: 99 F | OXYGEN SATURATION: 96 % | HEART RATE: 64 BPM | BODY MASS INDEX: 24.6 KG/M2 | SYSTOLIC BLOOD PRESSURE: 169 MMHG | WEIGHT: 122 LBS | DIASTOLIC BLOOD PRESSURE: 68 MMHG | HEIGHT: 59 IN | RESPIRATION RATE: 16 BRPM

## 2024-07-18 LAB — GLUCOSE BLD-MCNC: 135 MG/DL (ref 70–99)

## 2024-07-18 PROCEDURE — 3E0U3BZ INTRODUCTION OF ANESTHETIC AGENT INTO JOINTS, PERCUTANEOUS APPROACH: ICD-10-PCS | Performed by: ANESTHESIOLOGY

## 2024-07-18 PROCEDURE — 20610 DRAIN/INJ JOINT/BURSA W/O US: CPT | Performed by: ANESTHESIOLOGY

## 2024-07-18 PROCEDURE — 77002 NEEDLE LOCALIZATION BY XRAY: CPT | Performed by: ANESTHESIOLOGY

## 2024-07-18 PROCEDURE — 3E0U33Z INTRODUCTION OF ANTI-INFLAMMATORY INTO JOINTS, PERCUTANEOUS APPROACH: ICD-10-PCS | Performed by: ANESTHESIOLOGY

## 2024-07-18 RX ORDER — LIDOCAINE HYDROCHLORIDE 10 MG/ML
INJECTION, SOLUTION EPIDURAL; INFILTRATION; INTRACAUDAL; PERINEURAL
Status: DISCONTINUED | OUTPATIENT
Start: 2024-07-18 | End: 2024-07-18

## 2024-07-18 RX ORDER — METHYLPREDNISOLONE ACETATE 40 MG/ML
INJECTION, SUSPENSION INTRA-ARTICULAR; INTRALESIONAL; INTRAMUSCULAR; SOFT TISSUE
Status: DISCONTINUED | OUTPATIENT
Start: 2024-07-18 | End: 2024-07-18

## 2024-07-18 NOTE — OPERATIVE REPORT
[facility]  Operative Report  2024     Wagner Walsh Patient Status:  Hospital Outpatient Surgery    1940 MRN [id number]   Location [facility] Attending Bolivar Negron MD   Hosp Day # 0 PCP Huey Madrigal DO     Indication: Wagner is a 84 year old female with hip pain    Preoperative Diagnosis:  Primary osteoarthritis of left hip [M16.12]    Postoperative Diagnosis: Same as above.    Procedure performed: left HIP JOINT INJECTION with local    Anesthesia: Local      EBL: Less than 1 ml.    Procedure Description:  After reviewing the patient’s history and performing a focused physical examination, the diagnosis was confirmed and contraindications such as infection and coagulopathy were ruled out.  Following review of potential side effects and complications, including but not necessarily limited to infection, allergic reaction, local tissue breakdown, nerve injury, and paresis, the patient indicated they understood and agreed to proceed.      The patient was brought to the procedure room and placed in supine position. After prepping and draping, the hip joint was identified with the help of fluoroscopy.  A 22-gauge 3.5-inch spinal needle was used to enter the joint after local infiltration with lidocaine.  Then 0.5 cc dye was injected and a nice hip arthrogram was revealed.  After that, 40 mg Depo-Medrol mixed with 4 cc 1% Lidocaine was injected . The patient tolerated the procedure very well. The patient had complete understanding of the risks and benefits of the procedure.      Complications: None.    Follow up:  Clinic    Bolivar Negron MD

## 2024-07-18 NOTE — H&P
History & Physical Examination    Patient Name: Wagner Walsh  MRN: [id number]  CSN: [id number]  YOB: 1940    Pre-Operative Diagnosis:  Primary osteoarthritis of left hip [M16.12]    Present Illness: Primary osteoarthritis of the    ASA: 2  MP class: 1  Sedation: Local      Medications Prior to Admission   Medication Sig Dispense Refill Last Dose    neomycin-polymyxin-dexamethasone 3.5-12151-1.1 Ophthalmic Ointment APPLY A SMALL AMOUNT OF OINTMENT INTO EACH EYE NIGHTLY AT BEDTIME       Ferrous Sulfate 325 (65 Fe) MG Oral Tab Take 1 tablet (325 mg total) by mouth daily with breakfast. 90 tablet 0     Tofacitinib Citrate ER (XELJANZ XR) 11 MG Oral Tablet 24 Hr Take 1 tablet by mouth daily. 30 tablet 11     rosuvastatin 10 MG Oral Tab Take 1 tablet (10 mg total) by mouth nightly. Pt is taking this 3 times a week only       Ascorbic Acid (VITAMIN C) 1000 MG Oral Tab Take 1 tablet (1,000 mg total) by mouth daily.       cyanocobalamin 1000 MCG Oral Tab Take 1 tablet (1,000 mcg total) by mouth daily.       ACCU-CHEK GUIDE In Vitro Strip USE 1 STRIP TO CHECK GLUCOSE ONCE DAILY 100 strip 0     ACCU-CHEK FASTCLIX LANCETS Does not apply Misc USE TO CHECK GLUCOSE ONCE DAILY AS DIRECTED 102 each 2     losartan Potassium 50 MG Oral Tab Take 1 tablet (50 mg total) by mouth nightly. 90 tablet 3     TURMERIC OR Take by mouth.       Vitamin D3 2000 units Oral Cap Take 1 capsule (2,000 Units total) by mouth daily.        No current facility-administered medications for this encounter.       Allergies:   Allergies   Allergen Reactions    Diclofenac HIVES     Topical, hives/rash of bilateral dorsal distal forearms    Penicillins SHORTNESS OF BREATH    Whey OTHER (SEE COMMENTS)    Statins MYALGIA    Zetia [Ezetimibe] MYALGIA and OTHER (SEE COMMENTS)     Back aches.    Zocor [Simvastatin] MYALGIA    Colestipol UNKNOWN    Perfumes UNKNOWN    Mold Coughing       Past Medical History:    Arterial disease (HCC)    Coronary artery  calcification seen on CT scan    Coronary artery disease    See Dr. Loki Ramires    Coronary artery disease involving native coronary artery of native heart without angina pectoris    Dermatochalasis of both upper eyelids    Essential hypertension    Hyperglycemia    Mixed hyperlipidemia    Postmenopausal    Prediabetes    Ptosis of both eyelids    Rheumatoid arthritis involving both hands (HCC)    Rheumatoid arthritis involving multiple sites (HCC)    Rheumatoid arthritis(714.0)    Sjogren's syndrome with dental involvement (HCC)    Type 2 diabetes mellitus without complication, without long-term current use of insulin (HCC)    Unspecified essential hypertension     Past Surgical History:   Procedure Laterality Date          Cataract      Colonoscopy  2017    Dr Emeka ibarra     Egd  2017    Dr. Emeka Ibarra     Oral surgery procedure       No family history on file.  Social History     Tobacco Use    Smoking status: Never    Smokeless tobacco: Never   Substance Use Topics    Alcohol use: No       SYSTEM Check if Review is Normal Check if Physical Exam is Normal If not normal, please explain:   HEENT [x ] [x ]    NECK & BACK [x ] [x ]    HEART [x ] [x ]    LUNGS [x ] [x ]    ABDOMEN [x ] [x ]    UROGENITAL [x ] [x ]    EXTREMITIES [x ] [x ]    OTHER        [ x ] I have discussed the risks and benefits and alternatives with the patient/family.  They understand and agree to proceed with plan of care.  [ x ] I have reviewed the History and Physical done within the last 30 days.  Any changes noted above.    Bolivar Negron MD

## 2024-07-18 NOTE — DISCHARGE INSTRUCTIONS
Home Care Instructions Following Your Pain Procedure     Wagner,  It has been a pleasure to have you as our patient. To help you at home, you must follow these general discharge instructions. We will review these with you before you are discharged. It is our hope that you have a complete and uneventful recovery from our procedure.     General Instructions:  What to Expect:  Bandages from your procedure today can be removed when you get home.  Please avoid soaking and/or swimming for 24 hours.  Showering is okay  It is normal to have increased pain symptoms and/or pain at injection site for up to 3-5 days after procedure, you can use heat or ice (20 minutes on 20 minutes off) for comfort.  You may experience some temporary side effects which may include restlessness or insomnia, flushing of the face, or heart palpitations.  Please contact the provider if these symptoms do not resolve within 3-4 days.  Lightheadedness or nausea may occur and should resolve within 24 to 48 hours.  If you develop a headache after treatment, rest, drink fluids (with caffeine, if possible) and take mild over-the-counter pain medication.  If the headache does not improve with the above treatment, contact the physician.  Home Medications:  Resume all previously prescribed medication.  Please avoid taking NSAIDs (Non-Steriodal Anti-Inflammatory Drugs) such as:  Ibuprofen ( Advil, Motrin) Aleve (Naproxen), Diclofenac, Meloxicam for 6 hours after procedure.   If you are on Coumadin (Warfarin) or any other anti-coagulant (or \"blood thinning\") medication such as Plavix (Clopidogrel), Xarelto (Rivaroxaban), Eliquis (Apixaban), Effient (Prasugrel) etc., restart on the following day from the procedure unless otherwise directed by your provider.  If you are a diabetic, please increase the frequency of your glucose monitoring after the procedure as steroids may cause a temporary (2-3 day) increase in your blood sugar.  Contact your primary care  physician if your blood sugar remains elevated as you may require some medication adjustment.  Diet:  Resume your regular diet as tolerated.  Activity:  We recommend that you relax and rest during the rest of your procedure day.  If you feel weakness in your arms or legs do not drive.  Follow-up Appointment  Please schedule a follow-up visit within 3 to 4 weeks after your last procedure date.  Question or Concerns:  Feel free to call our office with any questions or concerns at [phone] (option #2)    Wagner  Thank you for coming to [facility] for your procedure.  The nurses try very hard to make sure you receive the best care possible.  Your trust in them as well as us is greatly appreciated.    Thanks so much,   Dr. Bolivar Negron

## 2024-07-31 ENCOUNTER — OFFICE VISIT (OUTPATIENT)
Dept: PAIN CLINIC | Facility: CLINIC | Age: 84
End: 2024-07-31
Payer: MEDICARE

## 2024-07-31 VITALS — SYSTOLIC BLOOD PRESSURE: 144 MMHG | HEART RATE: 77 BPM | DIASTOLIC BLOOD PRESSURE: 88 MMHG | OXYGEN SATURATION: 97 %

## 2024-07-31 DIAGNOSIS — M16.12 PRIMARY OSTEOARTHRITIS OF LEFT HIP: Primary | ICD-10-CM

## 2024-07-31 PROBLEM — M16.11 PRIMARY OSTEOARTHRITIS OF RIGHT HIP: Status: ACTIVE | Noted: 2024-07-31

## 2024-07-31 PROCEDURE — 99214 OFFICE O/P EST MOD 30 MIN: CPT | Performed by: ANESTHESIOLOGY

## 2024-07-31 NOTE — PROGRESS NOTES
Last procedure: left HIP JOINT INJECTION with local   Date: 7/18/24  Percentage of relief obtained: 80%  Duration of relief: Consistent    Current Pain Score: 6-7    Narcotic Contract Exp: NA

## 2024-07-31 NOTE — PROGRESS NOTES
Name: Wagner Walsh   : 1940   DOS: 2024     Pain Clinic Follow Up Visit:     Chief Complaint   Patient presents with    Follow - Up     FU after left HIP JOINT INJECTION with local       Wagner Walsh is a 84 year old female with end-stage osteoarthritis of the left hip.  The patient is following up after intra-articular hip injection.  Reports 80% improvement in pain.  Overall very pleased with results of procedure.  Patient has some questions regarding PRP.  Pt denies any chills, fever, or weakness. There is no bladder or bowel incontinence associated with the pain.    REVIEW OF SYSTEMS:  A ten point review of systems was performed with pertinent positives and negatives in the HPI.    Allergies   Allergen Reactions    Diclofenac HIVES     Topical, hives/rash of bilateral dorsal distal forearms    Penicillins SHORTNESS OF BREATH    Whey OTHER (SEE COMMENTS)    Statins MYALGIA    Zetia [Ezetimibe] MYALGIA and OTHER (SEE COMMENTS)     Back aches.    Zocor [Simvastatin] MYALGIA    Colestipol UNKNOWN    Perfumes UNKNOWN    Mold Coughing       Current Outpatient Medications   Medication Sig Dispense Refill    neomycin-polymyxin-dexamethasone 3.5-65721-7.1 Ophthalmic Ointment APPLY A SMALL AMOUNT OF OINTMENT INTO EACH EYE NIGHTLY AT BEDTIME      Ferrous Sulfate 325 (65 Fe) MG Oral Tab Take 1 tablet (325 mg total) by mouth daily with breakfast. 90 tablet 0    Tofacitinib Citrate ER (XELJANZ XR) 11 MG Oral Tablet 24 Hr Take 1 tablet by mouth daily. 30 tablet 11    rosuvastatin 10 MG Oral Tab Take 1 tablet (10 mg total) by mouth nightly. Pt is taking this 3 times a week only      Ascorbic Acid (VITAMIN C) 1000 MG Oral Tab Take 1 tablet (1,000 mg total) by mouth daily.      cyanocobalamin 1000 MCG Oral Tab Take 1 tablet (1,000 mcg total) by mouth daily.      ACCU-CHEK GUIDE In Vitro Strip USE 1 STRIP TO CHECK GLUCOSE ONCE DAILY 100 strip 0    ACCU-CHEK FASTCLIX LANCETS Does not apply Misc USE TO CHECK GLUCOSE  ONCE DAILY AS DIRECTED 102 each 2    losartan Potassium 50 MG Oral Tab Take 1 tablet (50 mg total) by mouth nightly. 90 tablet 3    TURMERIC OR Take by mouth.      Vitamin D3 2000 units Oral Cap Take 1 capsule (2,000 Units total) by mouth daily.           EXAM:   /88 (BP Location: Left arm, Patient Position: Sitting)   Pulse 77   SpO2 97%   General:  Patient is a(n) 84 year old year old female in no acute distress.  Neurologic:: WNL-Orientation to time, place and person, normal mood & affect, concentration & attention span intact.   Inspection:  Ambulates with well-coordinated, fluid, non-antalgic gait.  Gait is intact cane  Neck: Upper extremity range of motion is intact  Cranial nerve: Grossly intact  Respiratory: Speech is nonlabored  Back: Gait is intact    IMAGES:     Intra fluoroscopy reviewed consistent with intra-articular hip injection    ASSESSMENT AND PLAN:     1. Primary osteoarthritis of left hip        The patient is a very pleasant 84-year-old female with a longstanding history of hip pain.  This is due to end-stage osteoarthritis of the left hip with bone-on-bone disease.  The patient is overall doing quite well with 80% improvement.  Discussed role for repeat injection.  Discussed PRP.  Patient states that she has some issues with constipation.  This is unrelated to the injection and more likely associated with iron supplementation for anemia.  Recommend to follow-up with her primary care physician regarding this.  Patient can follow-up on as-needed basis.      Radiology orders and consultations:None  The patient indicates understanding of these issues and agrees to the plan.  No follow-ups on file.    Bolivar Negron MD, 7/31/2024, 12:58 PM

## 2024-07-31 NOTE — PATIENT INSTRUCTIONS
Refill policies:    Allow 2-3 business days for refills; controlled substances may take longer.  Contact your pharmacy at least 5 days prior to running out of medication and have them send an electronic request or submit request through the “request refill” option in your [employer] account.  Refills are not addressed on weekends; covering physicians do not authorize routine medications on weekends.  No narcotics or controlled substances are refilled after noon on Fridays or by on call physicians.  By law, narcotics must be electronically prescribed.  A 30 day supply with no refills is the maximum allowed.  If your prescription is due for a refill, you may be due for a follow up appointment.  To best provide you care, patients receiving routine medications need to be seen at least once a year.  Patients receiving narcotic/controlled substance medications need to be seen at least once every 3 months.  In the event that your preferred pharmacy does not have the requested medication in stock (e.g. Backordered), it is your responsibility to find another pharmacy that has the requested medication available.  We will gladly send a new prescription to that pharmacy at your request.    Scheduling Tests:    If your physician has ordered radiology tests such as MRI or CT scans, please contact Central Scheduling at [phone] right away to schedule the test.  Once scheduled, the [facility] Centralized Referral Team will work with your insurance carrier to obtain pre-certification or prior authorization.  Depending on your insurance carrier, approval may take 3-10 days.  It is highly recommended patients assure they have received an authorization before having a test performed.  If test is done without insurance authorization, patient may be responsible for the entire amount billed.      Precertification and Prior Authorizations:  If your physician has recommended that you have a procedure or additional testing performed the [facility]  Centralized Referral Team will contact your insurance carrier to obtain pre-certification or prior authorization.    You are strongly encouraged to contact your insurance carrier to verify that your procedure/test has been approved and is a COVERED benefit.  Although the [facility] Centralized Referral Team does its due diligence, the insurance carrier gives the disclaimer that \"Although the procedure is authorized, this does not guarantee payment.\"    Ultimately the patient is responsible for payment.   Thank you for your understanding in this matter.  Paperwork Completion:  If you require FMLA or disability paperwork for your recovery, please make sure to either drop it off or have it faxed to our office at [phone]. Be sure the form has your name and date of birth on it.  The form will be faxed to our Forms Department and they will complete it for you.  There is a 25$ fee for all forms that need to be filled out.  Please be aware there is a 10-14 day turnaround time.  You will need to sign a release of information (MARCOS) form if your paperwork does not come with one.  You may call the Forms Department with any questions at [phone].  Their fax number is [phone].

## 2024-09-13 ENCOUNTER — LAB ENCOUNTER (OUTPATIENT)
Dept: LAB | Age: 84
End: 2024-09-13
Attending: FAMILY MEDICINE
Payer: MEDICARE

## 2024-09-13 DIAGNOSIS — D64.9 ANEMIA, UNSPECIFIED TYPE: ICD-10-CM

## 2024-09-13 DIAGNOSIS — E11.9 TYPE 2 DIABETES MELLITUS WITHOUT COMPLICATION, WITHOUT LONG-TERM CURRENT USE OF INSULIN (HCC): ICD-10-CM

## 2024-09-13 LAB
ALBUMIN SERPL-MCNC: 5.1 G/DL (ref 3.2–4.8)
ALBUMIN/GLOB SERPL: 1.6 {RATIO} (ref 1–2)
ALP LIVER SERPL-CCNC: 49 U/L
ALT SERPL-CCNC: 21 U/L
ANION GAP SERPL CALC-SCNC: 6 MMOL/L (ref 0–18)
AST SERPL-CCNC: 25 U/L (ref ?–34)
BASOPHILS # BLD AUTO: 0.02 X10(3) UL (ref 0–0.2)
BASOPHILS NFR BLD AUTO: 0.6 %
BILIRUB SERPL-MCNC: 0.7 MG/DL (ref 0.2–1.1)
BUN BLD-MCNC: 19 MG/DL (ref 9–23)
CALCIUM BLD-MCNC: 10.3 MG/DL (ref 8.7–10.4)
CHLORIDE SERPL-SCNC: 106 MMOL/L (ref 98–112)
CHOLEST SERPL-MCNC: 206 MG/DL (ref ?–200)
CO2 SERPL-SCNC: 28 MMOL/L (ref 21–32)
CREAT BLD-MCNC: 0.75 MG/DL
CREAT UR-SCNC: 39.7 MG/DL
DEPRECATED HBV CORE AB SER IA-ACNC: 249.1 NG/ML
EGFRCR SERPLBLD CKD-EPI 2021: 79 ML/MIN/1.73M2 (ref 60–?)
EOSINOPHIL # BLD AUTO: 0.05 X10(3) UL (ref 0–0.7)
EOSINOPHIL NFR BLD AUTO: 1.4 %
ERYTHROCYTE [DISTWIDTH] IN BLOOD BY AUTOMATED COUNT: 13 %
EST. AVERAGE GLUCOSE BLD GHB EST-MCNC: 143 MG/DL (ref 68–126)
FASTING PATIENT LIPID ANSWER: YES
FASTING STATUS PATIENT QL REPORTED: YES
GLOBULIN PLAS-MCNC: 3.1 G/DL (ref 2–3.5)
GLUCOSE BLD-MCNC: 138 MG/DL (ref 70–99)
HBA1C MFR BLD: 6.6 % (ref ?–5.7)
HCT VFR BLD AUTO: 34.6 %
HDLC SERPL-MCNC: 110 MG/DL (ref 40–59)
HGB BLD-MCNC: 11.8 G/DL
IMM GRANULOCYTES # BLD AUTO: 0.01 X10(3) UL (ref 0–1)
IMM GRANULOCYTES NFR BLD: 0.3 %
IRON SATN MFR SERPL: 34 %
IRON SERPL-MCNC: 120 UG/DL
LDLC SERPL CALC-MCNC: 80 MG/DL (ref ?–100)
LYMPHOCYTES # BLD AUTO: 1.05 X10(3) UL (ref 1–4)
LYMPHOCYTES NFR BLD AUTO: 30.3 %
MCH RBC QN AUTO: 33.6 PG (ref 26–34)
MCHC RBC AUTO-ENTMCNC: 34.1 G/DL (ref 31–37)
MCV RBC AUTO: 98.6 FL
MICROALBUMIN UR-MCNC: 13.9 MG/DL
MICROALBUMIN/CREAT 24H UR-RTO: 350.1 UG/MG (ref ?–30)
MONOCYTES # BLD AUTO: 0.28 X10(3) UL (ref 0.1–1)
MONOCYTES NFR BLD AUTO: 8.1 %
NEUTROPHILS # BLD AUTO: 2.06 X10 (3) UL (ref 1.5–7.7)
NEUTROPHILS # BLD AUTO: 2.06 X10(3) UL (ref 1.5–7.7)
NEUTROPHILS NFR BLD AUTO: 59.3 %
NONHDLC SERPL-MCNC: 96 MG/DL (ref ?–130)
OSMOLALITY SERPL CALC.SUM OF ELEC: 294 MOSM/KG (ref 275–295)
PLATELET # BLD AUTO: 220 10(3)UL (ref 150–450)
POTASSIUM SERPL-SCNC: 4.8 MMOL/L (ref 3.5–5.1)
PROT SERPL-MCNC: 8.2 G/DL (ref 5.7–8.2)
RBC # BLD AUTO: 3.51 X10(6)UL
SODIUM SERPL-SCNC: 140 MMOL/L (ref 136–145)
TOTAL IRON BINDING CAPACITY: 353 UG/DL (ref 250–425)
TRANSFERRIN SERPL-MCNC: 299 MG/DL (ref 250–380)
TRIGL SERPL-MCNC: 94 MG/DL (ref 30–149)
VLDLC SERPL CALC-MCNC: 15 MG/DL (ref 0–30)
WBC # BLD AUTO: 3.5 X10(3) UL (ref 4–11)

## 2024-09-13 PROCEDURE — 82728 ASSAY OF FERRITIN: CPT

## 2024-09-13 PROCEDURE — 82570 ASSAY OF URINE CREATININE: CPT

## 2024-09-13 PROCEDURE — 36415 COLL VENOUS BLD VENIPUNCTURE: CPT

## 2024-09-13 PROCEDURE — 83036 HEMOGLOBIN GLYCOSYLATED A1C: CPT

## 2024-09-13 PROCEDURE — 83540 ASSAY OF IRON: CPT

## 2024-09-13 PROCEDURE — 80053 COMPREHEN METABOLIC PANEL: CPT

## 2024-09-13 PROCEDURE — 85025 COMPLETE CBC W/AUTO DIFF WBC: CPT

## 2024-09-13 PROCEDURE — 82043 UR ALBUMIN QUANTITATIVE: CPT

## 2024-09-13 PROCEDURE — 83550 IRON BINDING TEST: CPT

## 2024-09-13 PROCEDURE — 80061 LIPID PANEL: CPT

## 2024-09-17 ENCOUNTER — OFFICE VISIT (OUTPATIENT)
Dept: FAMILY MEDICINE CLINIC | Facility: CLINIC | Age: 84
End: 2024-09-17
Payer: MEDICARE

## 2024-09-17 VITALS
HEART RATE: 84 BPM | DIASTOLIC BLOOD PRESSURE: 68 MMHG | SYSTOLIC BLOOD PRESSURE: 152 MMHG | RESPIRATION RATE: 16 BRPM | TEMPERATURE: 98 F | BODY MASS INDEX: 24.8 KG/M2 | WEIGHT: 123 LBS | HEIGHT: 59 IN

## 2024-09-17 DIAGNOSIS — D64.9 NORMOCYTIC ANEMIA: ICD-10-CM

## 2024-09-17 DIAGNOSIS — I10 WHITE COAT SYNDROME WITH HYPERTENSION: ICD-10-CM

## 2024-09-17 DIAGNOSIS — D64.9 MILD ANEMIA: ICD-10-CM

## 2024-09-17 DIAGNOSIS — I25.10 CORONARY ARTERY DISEASE INVOLVING NATIVE CORONARY ARTERY OF NATIVE HEART WITHOUT ANGINA PECTORIS: ICD-10-CM

## 2024-09-17 DIAGNOSIS — M35.0C SJOGREN'S SYNDROME WITH DENTAL INVOLVEMENT (HCC): ICD-10-CM

## 2024-09-17 DIAGNOSIS — E11.9 TYPE 2 DIABETES MELLITUS WITHOUT COMPLICATION, WITHOUT LONG-TERM CURRENT USE OF INSULIN (HCC): Primary | ICD-10-CM

## 2024-09-17 DIAGNOSIS — M05.79 RHEUMATOID ARTHRITIS, SEROPOSITIVE, MULTIPLE SITES (HCC): ICD-10-CM

## 2024-09-17 DIAGNOSIS — E78.2 MIXED HYPERLIPIDEMIA: ICD-10-CM

## 2024-09-17 DIAGNOSIS — I10 ESSENTIAL HYPERTENSION: ICD-10-CM

## 2024-09-17 PROCEDURE — G2211 COMPLEX E/M VISIT ADD ON: HCPCS | Performed by: FAMILY MEDICINE

## 2024-09-17 PROCEDURE — 99214 OFFICE O/P EST MOD 30 MIN: CPT | Performed by: FAMILY MEDICINE

## 2024-09-17 RX ORDER — LOSARTAN POTASSIUM 50 MG/1
50 TABLET ORAL NIGHTLY
Qty: 90 TABLET | Refills: 3 | Status: SHIPPED | OUTPATIENT
Start: 2024-09-17

## 2024-09-17 RX ORDER — ROSUVASTATIN CALCIUM 10 MG/1
10 TABLET, COATED ORAL
Qty: 90 TABLET | Refills: 3 | Status: SHIPPED | OUTPATIENT
Start: 2024-09-18

## 2024-09-17 NOTE — PROGRESS NOTES
Note to patient: The 21st Century Cures Act makes medical notes like these available to patients in the interest of transparency. However, be advised this is a medical document. It is intended as peer to peer communication. It is written in medical language and may contain abbreviations or verbiage that are unfamiliar. It may appear blunt or direct. Medical documents are intended to carry relevant information, facts as evident, and the clinical opinion of the practitioner.         Chief Complaint   Patient presents with    Diabetes     Follow up     Follow - Up     Iron - asking how long to take this medications        HPI:  83 y/o F with hx of diet controlled T2DM, RA, sjogren's, normocytic anemia presenting for f/u on her labs.     Anemia- stable, suspect she has anemia of chronic disease. She has been taking iron supplement. Hgb has improved to 11.9 advised to stop the iron supplement and just eat an iron rich diet, repeat cbc in 6 weeks .   Lab Results   Component Value Date    A1C 6.6 (H) 09/13/2024    A1C 6.9 (H) 02/22/2024    A1C 6.8 (A) 02/05/2024    A1C 7.0 (H) 02/28/2022    A1C 7.3 (H) 08/19/2021      Hx of HLD- patient is compliant with her statin that she taking 3 x/week.     BP- pt reports home readings are 130/80s. Feels nervous at doctor's visits. I have advised her to check BP at home daily in the afternoon and call back in 1 week with BP readings.     Hx of low b12- on otc b12 supplement.     Low wbc - she is on xeljanz. Due for f/u with Rheum regarding her RA. Due for TB screen, repeat cbc in 6 weeks approx.     Pt saw Dr. Negron and received hip steroid injection. Pain is a lot better.     Endoscopy 2017 normal   Hemorrhoids noted in 2017 on colonscopy theses were clipped otherwise normal.   Pt follows cardiology hx of eleacted CAC > 1200. Pt on statin. Her next appointment with Dr. Lo is in  November 2024.     Diabetes  She presents for her follow-up diabetic visit. She has type 2 diabetes  mellitus.   Last A1c value was 6.6% done 9/13/2024.    Pertinent negatives for diabetes include no chest pain and no fatigue. N.  retinopathy Current diabetic treatment includes diet. Her breakfast blood glucose is taken between 7-8 am. Her breakfast blood glucose range is generally 110-130 mg/dl.  Her diabetes is diet controlled.       Patient denies: chest pain, dyspnea, exertional chest pressure/discomfort, orthopnea and paroxysmal nocturnal dyspnea.  Patient is currently taking losartan 50 mg daily.     She had a Lexiscan done with her cardiologist and it was unremarkable.     Hgb is 11.9- ferritin normal. Advised pt to consume iron rich foods.      Mild microalbuminuria.        Review of Systems   Constitutional: Negative for fever, chills and fatigue. No distress.  HENT: Negative for hearing loss, congestion, sore throat, neck pain   Eyes: Negative for pain and visual disturbance.   Respiratory: Negative for cough, chest tightness, shortness of breath and wheezing.    Cardiovascular: Negative for chest pain, palpitations and leg swelling.   Gastrointestinal: Negative for nausea, vomiting, abdominal pain, diarrhea, blood in stool and abdominal distention.   Genitourinary: Negative for dysuria, hematuria and difficulty urinating.      Patient Active Problem List   Diagnosis    Rheumatoid arthritis, seropositive, multiple sites (HCC)    Coronary artery disease involving native coronary artery of native heart without angina pectoris    Essential hypertension    Mixed hyperlipidemia    Type 2 diabetes mellitus without complication, without long-term current use of insulin (HCC)    Anemia    Cortical age-related cataract, bilateral    Lagophthalmos of left upper eyelid    Presbyopia    Dermatochalasis of both upper eyelids    Ptosis of both eyelids    Use of cane as ambulatory aid    Vitamin D deficiency    Sjogren's syndrome with dental involvement (HCC)    Primary osteoarthritis of left hip    Normocytic anemia        Past Medical History:    Arterial disease (HCC)    Coronary artery calcification seen on CT scan    Coronary artery disease    See Dr. Loki Ramires    Coronary artery disease involving native coronary artery of native heart without angina pectoris    Dermatochalasis of both upper eyelids    Essential hypertension    Hyperglycemia    Mixed hyperlipidemia    Postmenopausal    Prediabetes    Ptosis of both eyelids    Rheumatoid arthritis involving both hands (HCC)    Rheumatoid arthritis involving multiple sites (HCC)    Rheumatoid arthritis(714.0)    Sjogren's syndrome with dental involvement (HCC)    Type 2 diabetes mellitus without complication, without long-term current use of insulin (HCC)    Unspecified essential hypertension     Past Surgical History:   Procedure Laterality Date          Cataract      Colonoscopy  2017    Dr Emeka ibarra     Egd  2017    Dr. Emeka Ibarra     Oral surgery procedure       History reviewed. No pertinent family history.  Social History     Socioeconomic History    Marital status: Single   Tobacco Use    Smoking status: Never     Passive exposure: Past    Smokeless tobacco: Never   Vaping Use    Vaping status: Never Used   Substance and Sexual Activity    Alcohol use: No    Drug use: No   Other Topics Concern    Caffeine Concern Yes     Comment: herbal tea    Exercise No    Seat Belt Yes     Social Determinants of Health     Physical Activity: Unknown (3/7/2022)    Received from [facility]    Exercise Vital Sign     Days of Exercise per Week: 0 days       Current Outpatient Medications   Medication Sig Dispense Refill    losartan 50 MG Oral Tab Take 1 tablet (50 mg total) by mouth nightly. 90 tablet 3    [START ON 2024] rosuvastatin 10 MG Oral Tab Take 1 tablet (10 mg total) by mouth 3 (three) times a week. Pt is taking this 3 times a week only 90 tablet 3    neomycin-polymyxin-dexamethasone [id number] Ophthalmic  Ointment APPLY A SMALL AMOUNT OF OINTMENT INTO EACH EYE NIGHTLY AT BEDTIME      Ferrous Sulfate 325 (65 Fe) MG Oral Tab Take 1 tablet (325 mg total) by mouth daily with breakfast. 90 tablet 0    Tofacitinib Citrate ER (XELJANZ XR) 11 MG Oral Tablet 24 Hr Take 1 tablet by mouth daily. 30 tablet 11    Ascorbic Acid (VITAMIN C) 1000 MG Oral Tab Take 1 tablet (1,000 mg total) by mouth daily.      cyanocobalamin 1000 MCG Oral Tab Take 1 tablet (1,000 mcg total) by mouth daily.      ACCU-CHEK GUIDE In Vitro Strip USE 1 STRIP TO CHECK GLUCOSE ONCE DAILY 100 strip 0    ACCU-CHEK FASTCLIX LANCETS Does not apply Misc USE TO CHECK GLUCOSE ONCE DAILY AS DIRECTED 102 each 2    Vitamin D3 2000 units Oral Cap Take 1 capsule (2,000 Units total) by mouth daily.         Allergies  Allergies   Allergen Reactions    Diclofenac HIVES     Topical, hives/rash of bilateral dorsal distal forearms    Penicillins SHORTNESS OF BREATH    Whey OTHER (SEE COMMENTS)    Statins MYALGIA    Zetia [Ezetimibe] MYALGIA and OTHER (SEE COMMENTS)     Back aches.    Zocor [Simvastatin] MYALGIA    Colestipol UNKNOWN    Perfumes UNKNOWN    Mold Coughing       Health Maintenance  Immunizations:  Immunization History   Administered Date(s) Administered    Covid-19 Vaccine Moderna 100 mcg/0.5 ml 03/16/2021, 04/13/2021, 10/28/2021    Covid-19 Vaccine Moderna Bivalent 50mcg/0.5mL 12+ years 11/18/2022    Pneumococcal (Prevnar 13) 05/16/2018    Pneumovax 23 06/17/2019         Physical Exam  /68   Pulse 84   Temp 97.8 °F (36.6 °C) (Temporal)   Resp 16   Ht 4' 11\" (1.499 m)   Wt 123 lb (55.8 kg)   BMI 24.84 kg/m²   Constitutional: Oriented to person, place, and time. No distress.   HEENT:  Normocephalic and atraumatic.      Cardiovascular: Normal rate, regular rhythm and intact distal pulses.  No murmur, rubs or gallops.   Pulmonary/Chest: Effort normal and breath sounds normal. No respiratory distress. No wheezes, rhonchi or rales  Abdominal: Soft. Bowel  sounds are normal. Non tender, no masses, no organomegaly or hernias.  Skin: Skin is warm and dry.  Psychiatric: Normal mood and affect.     A/P:    Encounter Diagnoses   Name Primary?    Type 2 diabetes mellitus without complication, without long-term current use of insulin ([facility]) Yes    Mixed hyperlipidemia     Essential hypertension     Coronary artery disease involving native coronary artery of native heart without angina pectoris     Rheumatoid arthritis, seropositive, multiple sites (HCC)     Sjogren's syndrome with dental involvement ([facility])     Normocytic anemia     Mild anemia     White coat syndrome with hypertension      1. Type 2 diabetes mellitus without complication, without long-term current use of insulin ([facility])  Diabetes: A1c is 6.6 done 9/13/2024 which shows Excellent control. Continue current meds and lifestyle modification.  Diabetic Medications: Not currently on Diabetic meds.Meds : No medications needed at this time  Diabetic Complication(s): CKD 2 (GFR 79).   Cataract history  CAD Kidney complications: Microalbuminuria ( mg/dL) Diabetes is improving/stable/worsening : improving with lifestyle modifications Dietary recommendations for ADA diet.  Reminded to get yearly retinal exam. Reassess Diabetes in : in 6 months    - CMP in 6 months; Future  - Hemoglobin A1C in 6 months; Future  - Microalb/Creat Ratio, Random Urine in 6 months; Future    2. Mixed hyperlipidemia  - rosuvastatin 10 MG Oral Tab; Take 1 tablet (10 mg total) by mouth 3 (three) times a week. Pt is taking this 3 times a week only  Dispense: 90 tablet; Refill: 3    3. Essential hypertension  Advised pt to check her bp at home and call back in 1 week with home readings.   BP shows poor control with last BP of 152/68. Lifestyle changes, diet, exercise and weight loss were counseled. Medication changes as listed.   9/13/2024: Potassium 4.8; Creatinine 0.75; eGFR-Cr 79  BP Meds: losartan Tabs - 50 MG   ACE/ARB Adherence Good at 91%      - losartan 50 MG Oral Tab; Take 1 tablet (50 mg total) by mouth nightly.  Dispense: 90 tablet; Refill: 3    4. Coronary artery disease involving native coronary artery of native heart without angina pectoris  CAC > 1200 in 2018.  Medically managed with statin.   Follows cardio  Lexiscan negative.     5. Rheumatoid arthritis, seropositive, multiple sites (HCC)  Pt on xeljanz, symptoms controlled. Due for tb screen, needs to see Rheum for follow up   - Quantiferon TB Plus; Future  - Rheumatology Referral - In Network    6. Sjogren's syndrome with dental involvement (HCC)  See rheum for follo wup   - Quantiferon TB Plus; Future    7. Normocytic anemia  Will check b12, stop the iron, repeat cbc in 6 weeks.   - Vitamin B12 [E]; Future  - CBC With Differential With Platelet; Future    8. Mild anemia    9. White coat syndrome with hypertension  Pt to call back in 1 week with home readings.         Orders Placed This Encounter   Procedures    CMP in 6 months    Hemoglobin A1C in 6 months    Microalb/Creat Ratio, Random Urine in 6 months    Vitamin B12 [E]    CBC With Differential With Platelet    Quantiferon TB Plus       Meds & Refills for this Visit:  Requested Prescriptions     Signed Prescriptions Disp Refills    losartan 50 MG Oral Tab 90 tablet 3     Sig: Take 1 tablet (50 mg total) by mouth nightly.    rosuvastatin 10 MG Oral Tab 90 tablet 3     Sig: Take 1 tablet (10 mg total) by mouth 3 (three) times a week. Pt is taking this 3 times a week only       Imaging & Consults:  RHEUMATOLOGY - INTERNAL      Return in about 6 months (around 3/17/2025) for medication refills, blood pressure.    Patient Instructions   CALL OUR OFFICE IN 1 WEEK TO REPORT YOUR BLOOD PRESSURE READINGS TO OUR NURSES     All questions were answered and the patient understands the plan.     Huey Madrigal,         This note was prepared using Dragon Medical voice recognition dictation software. As a result errors may occur. When identified  these errors have been corrected. While every attempt is made to correct errors during dictation discrepancies may still exist.      Note to patient: The 21st Century Cures Act makes medical notes like these available to patients in the interest of transparency. However, be advised this is a medical document. It is intended as peer to peer communication. It is written in medical language and may contain abbreviations or verbiage that are unfamiliar. It may appear blunt or direct. Medical documents are intended to carry relevant information, facts as evident, and the clinical opinion of the practitioner.

## 2024-10-28 ENCOUNTER — TELEPHONE (OUTPATIENT)
Dept: FAMILY MEDICINE CLINIC | Facility: CLINIC | Age: 84
End: 2024-10-28

## 2024-10-28 NOTE — TELEPHONE ENCOUNTER
Patient came into the office and dropped off parking placard application, informed of $25 fee. Paperwork to Medical Assistant folder.

## 2024-12-03 ENCOUNTER — OFFICE VISIT (OUTPATIENT)
Dept: RHEUMATOLOGY | Facility: CLINIC | Age: 84
End: 2024-12-03
Payer: MEDICARE

## 2024-12-03 VITALS
SYSTOLIC BLOOD PRESSURE: 130 MMHG | HEIGHT: 59 IN | TEMPERATURE: 98 F | WEIGHT: 124 LBS | RESPIRATION RATE: 16 BRPM | OXYGEN SATURATION: 98 % | DIASTOLIC BLOOD PRESSURE: 70 MMHG | BODY MASS INDEX: 25 KG/M2 | HEART RATE: 77 BPM

## 2024-12-03 DIAGNOSIS — M06.9 RHEUMATOID ARTHRITIS INVOLVING MULTIPLE SITES, UNSPECIFIED WHETHER RHEUMATOID FACTOR PRESENT (HCC): Primary | ICD-10-CM

## 2024-12-03 PROCEDURE — 99214 OFFICE O/P EST MOD 30 MIN: CPT | Performed by: INTERNAL MEDICINE

## 2024-12-03 NOTE — PROGRESS NOTES
[facility], [address] [place] [address]      Consult     Wagner Walsh Patient Status:  No patient class for patient encounter    1940 MRN [id number]   Location [facility], [address] [place] [address] Attending No att. providers found   Hosp Day # 0 PCP Huey Madrigal DO     Referring Provider: PCP    Reason for Consultation: Seropositive rheumatoid arthritis; Sjogren syndrome overlap    Subjective:      Wagner Walsh is a 84 year old female with further evaluation for rheumatoid arthritis      Follow up for mixed connective tissue disease manifesting more as a seropositive rheumatoid arthritis and Sjogren's overlap. She presents with positive AARON, RNP, SSA, and CCP. She remains on Xeljanz XR 11 mg daily.     She was last seen in office 2023    Initially she was was seen as a new patient 2021  She had high inflammatory markers high CCP antibody and polyarthritis  We had recommended going back on treatment since she was doing well on xeljanz 11 mg XL daily.     She has been stable from a rheumatoid perspective.  He has brought in paperwork for us to sign for Xeljanz source.  Which will be faxed to our office to proceed.  States she is getting another insurance through Medicare.  Reminded patient to make sure it is covered    She states trying to understand the reason she needs to stay on medication and reminded her that she was much worse a year ago with polyarthritis and high CCP and inflammatory markers prior to going back on biologic     At today's visit, she states she is doing pretty well. She states no recent issues with joint swelling.     She states no major issues with her distal extremity joints.     Chronic hip pain; pain management had done ultrasound-guided injections of the hip with improvement only for 3 weeks but she states the intensity did improved she is considering going back to them she is not interested in seeing orthopedic surgeon  or hip replacement yet.  She is not interested in narcotics she states she takes Advil as needed    Reminded patient to get labs to monitor for drug toxicity from Xeljanz today and every 3 months    She states no gland swelling, chest pain, or shortness of breath.     She also has known moderate osteoarthritis of bilateral knees right greater than left followed by orthopedic surgery    She states she had a knee injection 2021 by an orthopedic surgeon out of [place] since her PCP as there with minimal improvement.    She does not recall trying Synvisc injections. She told was told to his not a surgical candidate     Her knee pain is surprisingly been stable.    History/Other:        Past Medical History:  Past Medical History:    Arterial disease (HCC)    Coronary artery calcification seen on CT scan    Coronary artery disease    See Dr. Loki Ramires    Coronary artery disease involving native coronary artery of native heart without angina pectoris    Dermatochalasis of both upper eyelids    Essential hypertension    Hyperglycemia    Mixed hyperlipidemia    Postmenopausal    Prediabetes    Ptosis of both eyelids    Rheumatoid arthritis involving both hands (HCC)    Rheumatoid arthritis involving multiple sites (HCC)    Rheumatoid arthritis(714.0)    Sjogren's syndrome with dental involvement (HCC)    Type 2 diabetes mellitus without complication, without long-term current use of insulin (HCC)    Unspecified essential hypertension        Past Surgical History:   Past Surgical History:   Procedure Laterality Date          Cataract      Colonoscopy  2017    Dr Emeka ibarra     Egd  2017    Dr. Emeka Ibarra     Oral surgery procedure         Social History:  reports that she has never smoked. She has been exposed to tobacco smoke. She has never used smokeless tobacco. She reports that she does not drink alcohol and does not use drugs.    Family History: History reviewed. No pertinent family  history.    Allergies:   Allergies   Allergen Reactions    Diclofenac HIVES     Topical, hives/rash of bilateral dorsal distal forearms    Penicillins SHORTNESS OF BREATH    Whey OTHER (SEE COMMENTS)    Statins MYALGIA    Zetia [Ezetimibe] MYALGIA and OTHER (SEE COMMENTS)     Back aches.    Zocor [Simvastatin] MYALGIA    Colestipol UNKNOWN    Perfumes UNKNOWN    Mold Coughing       Current Medications:  Current Outpatient Medications   Medication Sig Dispense Refill    losartan 50 MG Oral Tab Take 1 tablet (50 mg total) by mouth nightly. 90 tablet 3    rosuvastatin 10 MG Oral Tab Take 1 tablet (10 mg total) by mouth 3 (three) times a week. Pt is taking this 3 times a week only 90 tablet 3    Tofacitinib Citrate ER (XELJANZ XR) 11 MG Oral Tablet 24 Hr Take 1 tablet by mouth daily. 30 tablet 11    Ascorbic Acid (VITAMIN C) 1000 MG Oral Tab Take 1 tablet (1,000 mg total) by mouth daily.      cyanocobalamin 1000 MCG Oral Tab Take 1 tablet (1,000 mcg total) by mouth daily.      ACCU-CHEK GUIDE In Vitro Strip USE 1 STRIP TO CHECK GLUCOSE ONCE DAILY 100 strip 0    ACCU-CHEK FASTCLIX LANCETS Does not apply Misc USE TO CHECK GLUCOSE ONCE DAILY AS DIRECTED 102 each 2    Vitamin D3 2000 units Oral Cap Take 1 capsule (2,000 Units total) by mouth daily.        No current facility-administered medications for this visit.       (Not in a hospital admission)      Review of Systems:     Constitutional: Negative for chills, , fatigue, fever and unexpected weight change.    HENT: Negative for congestion, and mouth sores.    Eyes: Negative for photophobia, pain, redness and visual disturbance.    Respiratory: Negative for apnea, cough, chest tightness, shortness of breath, wheezing and stridor.    Cardiovascular: Negative for chest pain, palpitations and leg swelling.    Gastrointestinal: Negative for abdominal distention, abdominal pain, blood in stool, constipation, diarrhea and nausea.    Endocrine: Negative.     Genitourinary:  Negative for decreased urine volume, difficulty urinating, dyspareunia, dysuria, flank pain, and frequency.    Musculoskeletal: Negative for arthralgias, gait problem and joint swelling.    Skin: Negative for color change, pallor and rash. No raynauds or digital ulcerations no sclerodactly.    Allergic/Immunologic: Negative.    Neurological: Negative for dizziness, tremors, seizures, syncope, speech difficulty, weakness, light-headedness, numbness and headaches.    Hematological: Does not bruise/bleed easily.    Psychiatric/Behavioral: Negative for confusion, decreased concentration, hallucinations, self-injury, sleep disturbance and suicidal ideas or depression.    Objective:   [unfilled]  Vitals:    12/03/24 1217   BP: 130/70   Pulse: 77   Resp: 16   Temp: 98 °F (36.7 °C)          Constitutional: is oriented to person, place, and time. Appears well-developed and well-nourished. No distress.    HEENT: Normocephalic; EOMI; no jvd; no LAD; no oral or nasal ulcers.     Eyes: Conjunctivae and EOM are normal. Pupils are equal, round, and reactive to light.     Neck: Normal range of motion. No thyromegaly present.    Cardiovascular: RRR, no murmurs.    Lungs: Clear, Bilateral air entry, no wheezes.    Abdominal: Soft.    Musculoskeletal:    There is currently no information documented on the homunculus. Go to the Rheumatology activity and complete the homunculus joint exam.     Joint Exam 12/03/2024     No joint exam has been documented for this visit        Swollen: -- 0    Tender: -- 0        Right shoulder: Exhibits normal range of motion on abduction and internal rotation, no tenderness, no bony tenderness, no deformity, no laceration, no pain and no spasm.        Left shoulder: Exhibits normal range of motion on abduction and internal and external rotation.  no tenderness, no bony tenderness, no swelling, no effusion, no deformity, no pain, no spasm and normal strength.        Right elbow:  Exhibits normal  range of motion, no swelling, no effusion and no deformity. No tenderness found. No medial epicondyle, no lateral epicondyle and no olecranon process tenderness noted. There are no contractures or tophi or nodules.        Left elbow:  Normal range of motion, no swelling, no effusion and no deformity. No medial epicondyle, no lateral epicondyle and no olecranon process tenderness noted. There are no contractures or tophi or nodules.        Right wrist:  Exhibits normal range of motion, no tenderness, no bony tenderness, no swelling, no effusion and no crepitus. Flexion and extension intact w/o limitation.        Left wrist: Exhibits normal range of motion, no tenderness, no bony tenderness, no swelling, no effusion, no crepitus and no deformity. Flexion and extension intact without limitation.        Right hip: Exhibits normal range of motion, normal strength, no tenderness, no bony tenderness, no swelling and no crepitus.        Right hand: No synovitis of MCP,PIP or DIP joints; there are significant scattered enlarged Bouchards and Heberden nodules noted;  strength: 100%.  Moderate severe squaring first CMC joint        Left hand: No synovitis of MCP,PIP or DIP joints; significant scattered enlarged Bouchards and Heberden nodules noted;  strength: 100%.  Moderate severe squaring first CMC joint        Left hip: Exhibits normal range of motion, normal strength, no tenderness, no bony tenderness, No swelling and no crepitus.        Right knee: Exhibits normal range of motion, no swelling, no effusion, no ecchymosis, no deformity and no erythema. No tenderness found. No medial joint line, no lateral joint line, no MCL and no LCL tenderness noted. mod crepitation on flexion of knee and extension normal.        Left knee:  Exhibits normal range of motion, no swelling, no effusion, no ecchymosis and no erythema. No tenderness found. No medial joint line, no lateral joint line and no patellar tendon tenderness  noted. mod crepitation on flexion of the knee. Extension intact and normal.        Right ankle: No swelling, no deformity. No tenderness. Dorsiflexion and plantar flexion intact without limitation in range of motion.        Left ankle: Exhibits no swelling. No tenderness. No lateral malleolus and no medial malleolus tenderness found. Achilles tendon normal. Achilles tendon exhibits no pain, no defect and normal Hooker's test results.  Dorsiflexion and plantar flexion intact without limitation in range of motion.        Cervical back: Exhibits normal range of motion, no tenderness, no bony tenderness, no swelling, no pain and no spasm.        Thoracic back: Exhibits normal range of motion, no tenderness, no bony tenderness and no spasm.        Lumbar back:  Exhibits normal range of motion, no tenderness, no bony tenderness, no pain and no spasm.        Right foot: normal. There is normal range of motion, no tenderness, no bony tenderness, no crepitus and no laceration. There is no synovitis or tenderness of the MTP joints to palpation.  Bony enlargement first MTP joint        Left foot: normal. There is normal range of motion, no tenderness, no bony tenderness and no crepitus. There is no synovitis or tenderness of the MTP joints to palpation.  Bony enlargement first MTP joint    Lymphadenopathy: No submental, no submandibular, and no occipital adenopathy present, has no cervical adenopathy or axillary lympadenopathy.    Neurological: Alert and oriented. No focal motor or sensory abnormalities. Strength is 5/5 Upper Extremities/Lower Extremities proximally and distally.    Skin: Skin is warm, dry and intact.  No rashes no purpuric petechial lesions    Psychiatric: Normal behavior.    Pain and limitation moderately external rotation of the hips bilaterally left greater than right with groin pain elicited    Results:    Labs:      Lab Results   Component Value Date    WBC 3.5 (L) 09/13/2024    RBC 3.51 (L) 09/13/2024     HGB 11.8 (L) 09/13/2024    HCT 34.6 (L) 09/13/2024    MCV 98.6 09/13/2024    MCH 33.6 09/13/2024    MCHC 34.1 09/13/2024    RDW 13.0 09/13/2024    .0 09/13/2024    MPV 10.2 (H) 09/28/2012    MPV 10.2 (H) 09/28/2012       No components found for: \"RELY\", \"NMET\", \"MYEL\", \"PROMY\", \"RISA\", \"ABSNEUTS\", \"ABSBANDS\", \"ABMM\", \"ABMY\", \"ABPM\", \"ABBL\"      Lab Results   Component Value Date     09/13/2024    K 4.8 09/13/2024    CO2 28.0 09/13/2024    BUN 19 09/13/2024     09/12/2014    ALB 5.1 (H) 09/13/2024    AST 25 09/13/2024    ALT 21 09/13/2024          No components found for: \"ESRWESTERGRN\"       Lab Results   Component Value Date    CRP 0.44 (H) 10/18/2021         No results found for: \"COLOR\", \"CLARITY\", \"UROBILINOGEN\", \"YEAST\"  @LABRCNTIP(RF,B12)@      [unfilled]    Imaging:  No results found.    Assessment & Plan:      84-year-old woman comes in for further evaluation for likely diagnosis of mixed connective tissue disease with more manifestations of seropositive erosive rheumatoid arthritis and Sjogren syndrome    Likely diagnosis of mixed connective tissue disease with more manifestations of Seropositive erosive rheumatoid arthritis  Osteoarthritis multiple joints  Moderate to severe osteoarthritis of the right knee followed by orthopedic surgeon  History of coronavirus September 2022 stable  Severe hip osteoarthritis followed by pain management    Patient has no obvious synovitis on exam    continue xeljanz yet 11mg XL    Continue monitoring labs every 3 months.  Reminded patient to get labs today    Recent lipid panel reviewed in 2023.  Follow-up with PCP/cardiology    States her cardiologist recently increased her Lipitor and she has increased arthralgias noted elevated CPK.  Advised her to contact them to considered another medication to avoid worsening side effects    Previous to that she was off statins over 2 years without any obvious weakness or concerns for statin-induced  myopathy    Patient had minimal response to steroid injection done September 2021 by Ortho    Strongly suggest she go back to consider Synvisc injections and she is not interested in surgical intervention.  She does not want orthopedic consultation yet    States she will follow back up with Dr. Magana for another injection despite her only lasting for 3 weeks she does feel like it reduces the intensity of the pain.  She is not interested in physical therapy or narcotics she states she takes Advil.    If she worsens. Patient states she did well with therapy    She has previously failed methotrexate and hydroxychloroquine    Bone density was normal in 2021    She is not interested in prescription NSAIDs or steroids or narcotics    Update TB testing with next set of labs.  Orders given to patient today recommended she get it done today to update labs for drug toxicity from [facility]    New standing labs given to patient.    Education and counseling provided to patient.  Instructed patient to call my office or seek medical attention immediately if symptoms worsen. Risks and side effects of medications and diagnosis discussed in detail and patient was given written information on new prescribed medications.    Return to clinic:  Return in about 6 months (around 6/3/2025).    Ana Peterson MD  9/6/2023

## 2024-12-04 ENCOUNTER — LAB ENCOUNTER (OUTPATIENT)
Dept: LAB | Age: 84
End: 2024-12-04
Attending: FAMILY MEDICINE
Payer: MEDICARE

## 2024-12-04 DIAGNOSIS — M06.9 RHEUMATOID ARTHRITIS INVOLVING MULTIPLE SITES, UNSPECIFIED WHETHER RHEUMATOID FACTOR PRESENT (HCC): ICD-10-CM

## 2024-12-04 DIAGNOSIS — M35.0C SJOGREN'S SYNDROME WITH DENTAL INVOLVEMENT (HCC): ICD-10-CM

## 2024-12-04 DIAGNOSIS — D64.9 NORMOCYTIC ANEMIA: ICD-10-CM

## 2024-12-04 DIAGNOSIS — M05.79 RHEUMATOID ARTHRITIS, SEROPOSITIVE, MULTIPLE SITES (HCC): ICD-10-CM

## 2024-12-04 LAB
ALBUMIN SERPL-MCNC: 4.8 G/DL (ref 3.2–4.8)
ALBUMIN/GLOB SERPL: 1.7 {RATIO} (ref 1–2)
ALP LIVER SERPL-CCNC: 47 U/L
ALT SERPL-CCNC: 22 U/L
ANION GAP SERPL CALC-SCNC: 8 MMOL/L (ref 0–18)
AST SERPL-CCNC: 29 U/L (ref ?–34)
BASOPHILS # BLD AUTO: 0.01 X10(3) UL (ref 0–0.2)
BASOPHILS NFR BLD AUTO: 0.2 %
BILIRUB SERPL-MCNC: 0.8 MG/DL (ref 0.2–1.1)
BUN BLD-MCNC: 19 MG/DL (ref 9–23)
CALCIUM BLD-MCNC: 10.1 MG/DL (ref 8.7–10.4)
CHLORIDE SERPL-SCNC: 103 MMOL/L (ref 98–112)
CO2 SERPL-SCNC: 26 MMOL/L (ref 21–32)
CREAT BLD-MCNC: 0.72 MG/DL
EGFRCR SERPLBLD CKD-EPI 2021: 83 ML/MIN/1.73M2 (ref 60–?)
EOSINOPHIL # BLD AUTO: 0.05 X10(3) UL (ref 0–0.7)
EOSINOPHIL NFR BLD AUTO: 1.2 %
ERYTHROCYTE [DISTWIDTH] IN BLOOD BY AUTOMATED COUNT: 12.4 %
ERYTHROCYTE [SEDIMENTATION RATE] IN BLOOD: 8 MM/HR
FASTING STATUS PATIENT QL REPORTED: YES
GLOBULIN PLAS-MCNC: 2.8 G/DL (ref 2–3.5)
GLUCOSE BLD-MCNC: 127 MG/DL (ref 70–99)
HCT VFR BLD AUTO: 34.6 %
HGB BLD-MCNC: 11.7 G/DL
IMM GRANULOCYTES # BLD AUTO: 0.01 X10(3) UL (ref 0–1)
IMM GRANULOCYTES NFR BLD: 0.2 %
LYMPHOCYTES # BLD AUTO: 1.26 X10(3) UL (ref 1–4)
LYMPHOCYTES NFR BLD AUTO: 30.1 %
MCH RBC QN AUTO: 34 PG (ref 26–34)
MCHC RBC AUTO-ENTMCNC: 33.8 G/DL (ref 31–37)
MCV RBC AUTO: 100.6 FL
MONOCYTES # BLD AUTO: 0.32 X10(3) UL (ref 0.1–1)
MONOCYTES NFR BLD AUTO: 7.6 %
NEUTROPHILS # BLD AUTO: 2.54 X10 (3) UL (ref 1.5–7.7)
NEUTROPHILS # BLD AUTO: 2.54 X10(3) UL (ref 1.5–7.7)
NEUTROPHILS NFR BLD AUTO: 60.7 %
OSMOLALITY SERPL CALC.SUM OF ELEC: 288 MOSM/KG (ref 275–295)
PLATELET # BLD AUTO: 245 10(3)UL (ref 150–450)
POTASSIUM SERPL-SCNC: 4.1 MMOL/L (ref 3.5–5.1)
PROT SERPL-MCNC: 7.6 G/DL (ref 5.7–8.2)
RBC # BLD AUTO: 3.44 X10(6)UL
SODIUM SERPL-SCNC: 137 MMOL/L (ref 136–145)
VIT B12 SERPL-MCNC: >2000 PG/ML (ref 211–911)
WBC # BLD AUTO: 4.2 X10(3) UL (ref 4–11)

## 2024-12-04 PROCEDURE — 82607 VITAMIN B-12: CPT

## 2024-12-04 PROCEDURE — 86480 TB TEST CELL IMMUN MEASURE: CPT

## 2024-12-04 PROCEDURE — 80053 COMPREHEN METABOLIC PANEL: CPT

## 2024-12-04 PROCEDURE — 36415 COLL VENOUS BLD VENIPUNCTURE: CPT

## 2024-12-04 PROCEDURE — 85025 COMPLETE CBC W/AUTO DIFF WBC: CPT

## 2024-12-04 PROCEDURE — 85652 RBC SED RATE AUTOMATED: CPT

## 2024-12-06 LAB
M TB IFN-G CD4+ T-CELLS BLD-ACNC: 0.08 IU/ML
M TB TUBERC IFN-G BLD QL: NEGATIVE
M TB TUBERC IGNF/MITOGEN IGNF CONTROL: >10 IU/ML
QFT TB1 AG MINUS NIL: -0.01 IU/ML
QFT TB2 AG MINUS NIL: 0.02 IU/ML

## 2025-01-06 ENCOUNTER — TELEPHONE (OUTPATIENT)
Facility: CLINIC | Age: 85
End: 2025-01-06

## 2025-01-06 DIAGNOSIS — M06.9 RHEUMATOID ARTHRITIS INVOLVING MULTIPLE SITES, UNSPECIFIED WHETHER RHEUMATOID FACTOR PRESENT (HCC): Primary | ICD-10-CM

## 2025-01-06 RX ORDER — TOFACITINIB 11 MG/1
1 TABLET, FILM COATED, EXTENDED RELEASE ORAL DAILY
Qty: 30 TABLET | Refills: 2 | Status: SHIPPED | OUTPATIENT
Start: 2025-01-06 | End: 2025-02-05

## 2025-01-06 NOTE — TELEPHONE ENCOUNTER
PA for Xeljanz approved. effectivem 01/01/2025 - 12/31/2025.    Phoned pt, notified of above. Prescription [facility]. Will determine out of pocket cost, and if pt would require foundation assistance.

## 2025-01-06 NOTE — TELEPHONE ENCOUNTER
Pt called office, states Wally is not able to approve her at this time. Pt has new insurance. Sounds like they are requesting PA from .     ID# [id number] rx BIN :[id number]  N MED Daet    PA for Xeljanz started on Cover My Meds (Key: [id number])

## 2025-01-07 ENCOUNTER — TELEPHONE (OUTPATIENT)
Dept: FAMILY MEDICINE CLINIC | Facility: CLINIC | Age: 85
End: 2025-01-07

## 2025-01-07 NOTE — TELEPHONE ENCOUNTER
Patient is calling to ask if the report and EKG was received from the Cardiologist.  Duly was to send to Primary Care Provider.

## 2025-01-14 ENCOUNTER — TELEPHONE (OUTPATIENT)
Dept: RHEUMATOLOGY | Facility: CLINIC | Age: 85
End: 2025-01-14

## 2025-01-14 NOTE — TELEPHONE ENCOUNTER
Patient called stating she received call from [facility] stating she has $0 co-pay.    Calling to verify medication with [facility]. Request \"paper\" to verify medication and $0 co-pay.     Instructed to call [facility] pharmacy requesting additional information. Understanding verbalized.

## 2025-01-27 NOTE — TELEPHONE ENCOUNTER
There is a progress note by cardiologist in the chart with EKG interpretation and mention of labs with interpretation. I do not see any lab results or a copy of an EKG. IF a physical copy was sent it would have be entered.

## 2025-02-21 ENCOUNTER — TELEPHONE (OUTPATIENT)
Dept: FAMILY MEDICINE CLINIC | Facility: CLINIC | Age: 85
End: 2025-02-21

## 2025-02-25 ENCOUNTER — TELEPHONE (OUTPATIENT)
Facility: CLINIC | Age: 85
End: 2025-02-25

## 2025-02-25 NOTE — TELEPHONE ENCOUNTER
Pt called office, pt received bill from [employer] for $166.67 for Xeljanz. Pt receiving medication from Xeljanz from [employer] [facility] for $0.     Spoke to [facility], the $166.67 is towards her Medicare plan. Medicare lowered their threshold to $2000/yr. Pt chose the M3P program for $2000 spread over 12mo period. Payment of $166.67/mo     Phoned pt, and explained above. Voiced understanding.

## 2025-03-10 ENCOUNTER — TELEPHONE (OUTPATIENT)
Facility: CLINIC | Age: 85
End: 2025-03-10

## 2025-03-11 ENCOUNTER — LAB ENCOUNTER (OUTPATIENT)
Dept: LAB | Age: 85
End: 2025-03-11
Attending: FAMILY MEDICINE
Payer: MEDICARE

## 2025-03-11 ENCOUNTER — TELEPHONE (OUTPATIENT)
Dept: RHEUMATOLOGY | Facility: CLINIC | Age: 85
End: 2025-03-11

## 2025-03-11 DIAGNOSIS — E11.9 TYPE 2 DIABETES MELLITUS WITHOUT COMPLICATION, WITHOUT LONG-TERM CURRENT USE OF INSULIN (HCC): ICD-10-CM

## 2025-03-11 DIAGNOSIS — M06.9 RHEUMATOID ARTHRITIS INVOLVING MULTIPLE SITES, UNSPECIFIED WHETHER RHEUMATOID FACTOR PRESENT (HCC): ICD-10-CM

## 2025-03-11 LAB
ALBUMIN SERPL-MCNC: 5.3 G/DL (ref 3.2–4.8)
ALBUMIN/GLOB SERPL: 2 {RATIO} (ref 1–2)
ALP LIVER SERPL-CCNC: 46 U/L
ALT SERPL-CCNC: 18 U/L
ANION GAP SERPL CALC-SCNC: 9 MMOL/L (ref 0–18)
AST SERPL-CCNC: 27 U/L (ref ?–34)
BASOPHILS # BLD AUTO: 0.02 X10(3) UL (ref 0–0.2)
BASOPHILS NFR BLD AUTO: 0.6 %
BILIRUB SERPL-MCNC: 0.6 MG/DL (ref 0.2–1.1)
BUN BLD-MCNC: 17 MG/DL (ref 9–23)
CALCIUM BLD-MCNC: 10 MG/DL (ref 8.7–10.6)
CHLORIDE SERPL-SCNC: 106 MMOL/L (ref 98–112)
CO2 SERPL-SCNC: 28 MMOL/L (ref 21–32)
CREAT BLD-MCNC: 0.76 MG/DL
CREAT UR-SCNC: 34.2 MG/DL
EGFRCR SERPLBLD CKD-EPI 2021: 77 ML/MIN/1.73M2 (ref 60–?)
EOSINOPHIL # BLD AUTO: 0.06 X10(3) UL (ref 0–0.7)
EOSINOPHIL NFR BLD AUTO: 1.7 %
ERYTHROCYTE [DISTWIDTH] IN BLOOD BY AUTOMATED COUNT: 12.5 %
ERYTHROCYTE [SEDIMENTATION RATE] IN BLOOD: 15 MM/HR
EST. AVERAGE GLUCOSE BLD GHB EST-MCNC: 154 MG/DL (ref 68–126)
FASTING STATUS PATIENT QL REPORTED: YES
GLOBULIN PLAS-MCNC: 2.7 G/DL (ref 2–3.5)
GLUCOSE BLD-MCNC: 137 MG/DL (ref 70–99)
HBA1C MFR BLD: 7 % (ref ?–5.7)
HCT VFR BLD AUTO: 33.6 %
HGB BLD-MCNC: 11.3 G/DL
IMM GRANULOCYTES # BLD AUTO: 0.01 X10(3) UL (ref 0–1)
IMM GRANULOCYTES NFR BLD: 0.3 %
LYMPHOCYTES # BLD AUTO: 0.99 X10(3) UL (ref 1–4)
LYMPHOCYTES NFR BLD AUTO: 27.4 %
MCH RBC QN AUTO: 33.2 PG (ref 26–34)
MCHC RBC AUTO-ENTMCNC: 33.6 G/DL (ref 31–37)
MCV RBC AUTO: 98.8 FL
MICROALBUMIN UR-MCNC: 12.8 MG/DL
MICROALBUMIN/CREAT 24H UR-RTO: 374.3 UG/MG (ref ?–30)
MONOCYTES # BLD AUTO: 0.29 X10(3) UL (ref 0.1–1)
MONOCYTES NFR BLD AUTO: 8 %
NEUTROPHILS # BLD AUTO: 2.24 X10 (3) UL (ref 1.5–7.7)
NEUTROPHILS # BLD AUTO: 2.24 X10(3) UL (ref 1.5–7.7)
NEUTROPHILS NFR BLD AUTO: 62 %
OSMOLALITY SERPL CALC.SUM OF ELEC: 300 MOSM/KG (ref 275–295)
PLATELET # BLD AUTO: 256 10(3)UL (ref 150–450)
POTASSIUM SERPL-SCNC: 4 MMOL/L (ref 3.5–5.1)
PROT SERPL-MCNC: 8 G/DL (ref 5.7–8.2)
RBC # BLD AUTO: 3.4 X10(6)UL
SODIUM SERPL-SCNC: 143 MMOL/L (ref 136–145)
WBC # BLD AUTO: 3.6 X10(3) UL (ref 4–11)

## 2025-03-11 PROCEDURE — 83036 HEMOGLOBIN GLYCOSYLATED A1C: CPT

## 2025-03-11 PROCEDURE — 80053 COMPREHEN METABOLIC PANEL: CPT

## 2025-03-11 PROCEDURE — 82570 ASSAY OF URINE CREATININE: CPT

## 2025-03-11 PROCEDURE — 85025 COMPLETE CBC W/AUTO DIFF WBC: CPT

## 2025-03-11 PROCEDURE — 36415 COLL VENOUS BLD VENIPUNCTURE: CPT

## 2025-03-11 PROCEDURE — 85652 RBC SED RATE AUTOMATED: CPT

## 2025-03-11 PROCEDURE — 82043 UR ALBUMIN QUANTITATIVE: CPT

## 2025-03-11 NOTE — TELEPHONE ENCOUNTER
ef Range & Units  8:22 AM  (3/11/25) 3 mo ago  (12/4/24) 5 mo ago  (9/13/24) 8 mo ago  (6/20/24) 9 mo ago  (6/12/24) 1 yr ago  (3/11/24) 1 yr ago  (2/22/24)   WBC  4.0 - 11.0 x10(3) uL 3.6 Low  4.2 3.5 Low  4.6 5.7 6.2 4.5   RBC  3.80 - 5.30 x10(6)uL 3.40 Low  3.44 Low  3.51 Low  3.53 Low  3.66 Low  3.46 Low  3.41 Low    HGB  12.0 - 16.0 g/dL 11.3 Low  11.7 Low  11.8 Low  11.9 Low  12.0 11.7 Low  11.4 Low    HCT  35.0 - 48.0 % 33.6 Low  34.6 Low               White count is slightly low pretty close to baseline she periodically runs lower otherwise labs are good so far

## 2025-03-12 NOTE — TELEPHONE ENCOUNTER
Left detailed message on patient's voicemail regarding the below message:                      ef Range & Units  8:22 AM  (3/11/25) 3 mo ago  (12/4/24) 5 mo ago  (9/13/24) 8 mo ago  (6/20/24) 9 mo ago  (6/12/24) 1 yr ago  (3/11/24) 1 yr ago  (2/22/24)   WBC  4.0 - 11.0 x10(3) uL 3.6 Low  4.2 3.5 Low  4.6 5.7 6.2 4.5   RBC  3.80 - 5.30 x10(6)uL 3.40 Low  3.44 Low  3.51 Low  3.53 Low  3.66 Low  3.46 Low  3.41 Low    HGB  12.0 - 16.0 g/dL 11.3 Low  11.7 Low  11.8 Low  11.9 Low  12.0 11.7 Low  11.4 Low    HCT  35.0 - 48.0 % 33.6 Low  34.6 Low                      White count is slightly low pretty close to baseline she periodically runs lower otherwise labs are good so far     Patient was asked to call the office with any questions or concerns she may have.

## 2025-03-14 ENCOUNTER — OFFICE VISIT (OUTPATIENT)
Dept: FAMILY MEDICINE CLINIC | Facility: CLINIC | Age: 85
End: 2025-03-14
Payer: MEDICARE

## 2025-03-14 VITALS
HEART RATE: 81 BPM | OXYGEN SATURATION: 97 % | BODY MASS INDEX: 24.39 KG/M2 | TEMPERATURE: 97 F | DIASTOLIC BLOOD PRESSURE: 70 MMHG | RESPIRATION RATE: 16 BRPM | HEIGHT: 59 IN | SYSTOLIC BLOOD PRESSURE: 150 MMHG | WEIGHT: 121 LBS

## 2025-03-14 DIAGNOSIS — Z28.21 REFUSED INFLUENZA VACCINE: ICD-10-CM

## 2025-03-14 DIAGNOSIS — M35.0C SJOGREN'S SYNDROME WITH DENTAL INVOLVEMENT (HCC): ICD-10-CM

## 2025-03-14 DIAGNOSIS — Z99.89 USE OF CANE AS AMBULATORY AID: ICD-10-CM

## 2025-03-14 DIAGNOSIS — E11.9 TYPE 2 DIABETES MELLITUS WITHOUT COMPLICATION, WITHOUT LONG-TERM CURRENT USE OF INSULIN (HCC): ICD-10-CM

## 2025-03-14 DIAGNOSIS — I10 ESSENTIAL HYPERTENSION: ICD-10-CM

## 2025-03-14 DIAGNOSIS — Z00.00 ENCOUNTER FOR ANNUAL HEALTH EXAMINATION: Primary | ICD-10-CM

## 2025-03-14 DIAGNOSIS — M05.79 RHEUMATOID ARTHRITIS, SEROPOSITIVE, MULTIPLE SITES (HCC): ICD-10-CM

## 2025-03-14 DIAGNOSIS — E78.2 MIXED HYPERLIPIDEMIA: ICD-10-CM

## 2025-03-14 DIAGNOSIS — I25.10 CORONARY ARTERY DISEASE INVOLVING NATIVE CORONARY ARTERY OF NATIVE HEART WITHOUT ANGINA PECTORIS: ICD-10-CM

## 2025-03-14 DIAGNOSIS — D64.9 NORMOCYTIC ANEMIA: ICD-10-CM

## 2025-03-14 DIAGNOSIS — R80.9 MICROALBUMINURIA: ICD-10-CM

## 2025-03-14 DIAGNOSIS — Z12.31 BREAST CANCER SCREENING BY MAMMOGRAM: ICD-10-CM

## 2025-03-14 PROBLEM — E11.22 TYPE 2 DIABETES MELLITUS WITH DIABETIC CHRONIC KIDNEY DISEASE (HCC): Chronic | Status: ACTIVE | Noted: 2025-03-14

## 2025-03-14 NOTE — PROGRESS NOTES
Subjective:   Wagner Walsh is a 85 year old female who presents for a Medicare Subsequent Annual Wellness visit (Pt already had Initial Annual Wellness) and stable chronic illnesses   84 y/o F with hx of diet controlled T2DM, RA, sjogren's, normocytic anemia presenting for f/u on her labs.   Diabetes  She presents for her follow-up diabetic visit. She has type 2 diabetes mellitus.   Last A1c value was 7% done 3/11/2025.   Current diabetic treatment includes diet. Her breakfast blood glucose is taken between 7-8 am. Her breakfast blood glucose range is generally 110-130 mg/dl.  Her diabetes is diet controlled.     She is seeing Dr. Wooten - will request form [facility].     Anemia- stable,   she has anemia of chronic disease. She had  been taking iron supplement. She has had chornic anemia for many years. Denies rectal bleed,weightloss, epigastrc pain, fevers night sweats.    Hgb 11.3   she is not currently taking iron supplement. She stopped it couple months ago I advised her to  resume MV with iron.  Continue with iron rich diet and multivitamin that has 18mg of elemental iron in it.     Hx of HLD- patient is compliant with crestor 10mg that she is taking 3 x/week. Follows Dr. Lo     Hx of low b12- on otc b12 supplement.     Labs reviewed:  WBC chronically low, chornic anemia with hgb of 11  Endoscopy 2017 normal   Hemorrhoids noted in 2017 on colonscopy these were clipped otherwise normal.   Pt follows cardiology hx of   CAC > 1200.  . Her next appointment with Dr. Lo is in July  Her calcium score was above 1200. Stress test- negative.   She is tolerating Crestor 10 mg three weekly. Labs reviewed. LDL is at goal.   Non-smoker.     Pt is on losartan 50mg per day for HTN. At home her BP reading today was 120/78. She reports having whitecoat HTN. She is seeing Dr. Wheatley in July.   Patient denies: chest pain, dyspnea, exertional chest pressure/discomfort, orthopnea and paroxysmal nocturnal  dyspnea.  Patient is currently taking losartan 50 mg daily.     She had a Lexiscan done with her cardiologist and it was unremarkable.      She is using cane to ambulate.       She has  microalbuminuria.     History/Other:   Fall Risk Assessment:   She has been screened for Falls and is High Risk. Fall Prevention information provided to patient in After Visit Summary.    Do you feel unsteady when standing or walking?: Yes  Do you worry about falling?: No  Have you fallen in the past year?: No     Cognitive Assessment:   Abnormal  What day of the week is this?: Correct  What month is it?: Correct  What year is it?: Correct  Recall \"Ball\": Correct  Recall \"Flag\": Correct  Recall \"Tree\": Incorrect        Functional Ability/Status:   Wagner Walsh has some abnormal functions as listed below:  She has Walking problems based on screening of functional status. She has problems with Daily Activities based on screening of functional status. She has problems with Memory based on screening of functional status.       Depression Screening (PHQ-2/PHQ-9): PHQ-2 SCORE: 0  , done 3/14/2025   Last [place] Suicide Screening on 3/14/2025 was No Risk.         Advanced Directives:   She does have a Living Will but we do NOT have it on file in Epic.      She has a Power of  for Health Care on file [facility].  Discussed Advance Care Planning with patient (and family/surrogate if present). Standard forms made available to patient in After Visit Summary.      Patient Active Problem List   Diagnosis    Rheumatoid arthritis, seropositive, multiple sites (HCC)    Coronary artery disease involving native coronary artery of native heart without angina pectoris    Essential hypertension    Mixed hyperlipidemia    Type 2 diabetes mellitus without complication, without long-term current use of insulin (HCC)    Refused influenza vaccine    Anemia    Cortical age-related cataract, bilateral    Lagophthalmos of left upper eyelid    Presbyopia     Dermatochalasis of both upper eyelids    Ptosis of both eyelids    Use of cane as ambulatory aid    Vitamin D deficiency    Sjogren's syndrome with dental involvement (HCC)    Primary osteoarthritis of left hip    Normocytic anemia    Microalbuminuria    Type 2 diabetes mellitus with diabetic chronic kidney disease ([facility])     Allergies:  She is allergic to diclofenac, penicillins, whey, statins, zetia [ezetimibe], zocor [simvastatin], colestipol, perfumes, and mold.    Current Medications:  Outpatient Medications Marked as Taking for the 3/14/25 encounter (Office Visit) with Huey Madrigal,    Medication Sig    losartan 50 MG Oral Tab Take 1 tablet (50 mg total) by mouth nightly.    rosuvastatin 10 MG Oral Tab Take 1 tablet (10 mg total) by mouth 3 (three) times a week. Pt is taking this 3 times a week only    Tofacitinib Citrate ER (XELJANZ XR) 11 MG Oral Tablet 24 Hr Take 1 tablet by mouth daily.    Ascorbic Acid (VITAMIN C) 1000 MG Oral Tab Take 1 tablet (1,000 mg total) by mouth daily.    cyanocobalamin 1000 MCG Oral Tab Take 1 tablet (1,000 mcg total) by mouth twice a week.    ACCU-CHEK GUIDE In Vitro Strip USE 1 STRIP TO CHECK GLUCOSE ONCE DAILY    ACCU-CHEK FASTCLIX LANCETS Does not apply Misc USE TO CHECK GLUCOSE ONCE DAILY AS DIRECTED    Vitamin D3 2000 units Oral Cap Take 1 capsule (2,000 Units total) by mouth daily.       Medical History:  She  has a past medical history of Arterial disease (05/16/2018), Coronary artery calcification seen on CT scan (04/27/2018), Coronary artery disease, Coronary artery disease involving native coronary artery of native heart without angina pectoris (05/16/2018), Dermatochalasis of both upper eyelids (07/09/2021), Essential hypertension (05/16/2018), Hyperglycemia (05/16/2018), Mixed hyperlipidemia (05/16/2018), Postmenopausal (05/16/2018), Prediabetes (05/16/2018), Ptosis of both eyelids (07/09/2021), Rheumatoid arthritis involving both hands ([facility]) (02/11/2019),  Rheumatoid arthritis involving multiple sites ([facility]) (2018), Rheumatoid arthritis(714.0), Sjogren's syndrome with dental involvement ([facility]) (2024), Type 2 diabetes mellitus without complication, without long-term current use of insulin ([facility]) (2018), and Unspecified essential hypertension.  Surgical History:  She  has a past surgical history that includes ; oral surgery procedure; egd (2017); colonoscopy (2017); and cataract.   Family History:  Her family history is not on file.  Social History:  She  reports that she has never smoked. She has been exposed to tobacco smoke. She has never used smokeless tobacco. She reports that she does not drink alcohol and does not use drugs.    Tobacco:  She has never smoked tobacco.    CAGE Alcohol Screen:   CAGE screening score of 0 on 3/14/2025, showing low risk of alcohol abuse.      Patient Care Team:  Huey Madrigal DO as PCP - General (Family Medicine)  Loki Ramires MD (CARDIOLOGY)  Deana Tate DO (RHEUMATOLOGY)  Ivy Mccain PT (Physical Therapy)  Cathie Wooten MD as Referring Physician (OPHTHALMOLOGY)  Ana Peterson MD (RHEUMATOLOGY)  Vishal Andrews PT as Physical Therapist    Review of Systems  GENERAL: feels well otherwise  HEENT: denies nasal congestion, sinus pain or ST  LUNGS: denies shortness of breath with exertion  CARDIOVASCULAR: denies chest pain on exertion  GI: denies abdominal pain, denies heartburn    Objective:   Physical Exam  Constitutional:       Appearance: Normal appearance.   HENT:      Head: Normocephalic.      Right Ear: Tympanic membrane normal.      Left Ear: Tympanic membrane normal.   Eyes:      Conjunctiva/sclera: Conjunctivae normal.   Cardiovascular:      Rate and Rhythm: Normal rate and regular rhythm.      Heart sounds: Normal heart sounds. No murmur heard.  Pulmonary:      Effort: Pulmonary effort is normal.      Breath sounds: Normal breath sounds.   Abdominal:      General: Bowel sounds are  normal.      Tenderness: There is no abdominal tenderness.   Musculoskeletal:         General: No swelling.      Cervical back: Normal range of motion.      Comments: Tender left hip with flexion and extension.    Neurological:      Mental Status: She is alert.     Bilateral barefoot skin diabetic exam is normal, visualized feet and the appearance is normal.  Bilateral monofilament/sensation of both feet is normal.  Pulsation pedal pulse exam of both lower legs/feet is normal as well.           /70   Pulse 81   Temp 97 °F (36.1 °C) (Temporal)   Resp 16   Ht 4' 11\" (1.499 m)   Wt 121 lb (54.9 kg)   SpO2 97%   BMI 24.44 kg/m²  Estimated body mass index is 24.44 kg/m² as calculated from the following:    Height as of this encounter: 4' 11\" (1.499 m).    Weight as of this encounter: 121 lb (54.9 kg).    Medicare Hearing Assessment:   Hearing Screening    Time taken: 3/14/2025  1:59 PM  Entry User: Dariana Lay MA  Screening Method: Whisper Test  Whisper Test Result: Pass           Visual Acuity:   Right Eye Visual Acuity: Corrected Right Eye Chart Acuity: 20/20   Left Eye Visual Acuity: Corrected Left Eye Chart Acuity: 20/20   Both Eyes Visual Acuity: Corrected Both Eyes Chart Acuity: 20/20   Able To Tolerate Visual Acuity: Yes          Assessment & Plan:   Wagner Walsh is a 85 year old female who presents for a Medicare Assessment.     1. Encounter for annual health examination    2. Mixed hyperlipidemia  Cholesterol shows Good control. Long term heart-healthy diet and lifestyle discussed and encouraged to reduce risk of cardiovascular disease.  9/13/2024: Cholesterol, Total 206 (H); HDL Cholesterol 110 (H); Triglycerides 94; LDL Cholesterol 80  Cholesterol Meds: rosuvastatin Tabs - 10 MG  stable  Continue with current treatment plan      3. Essential hypertension  BP shows poor control with last BP of 150/70.   She has a component of whitecoat HTN, her reading at home are normotensive.   Lifestyle  changes, diet, exercise   were counseled.    BP Meds: losartan Tabs - 50 MG        4. Sjogren's syndrome with dental involvement (HCC)  Pt is on xeljanz.  Controlled symptoms.     5. Coronary artery disease involving native coronary artery of native heart without angina pectoris  Hx of CACS > 1200  Controlled with statin     6. Normocytic anemia     - likely due to low iron intake. Will repeat cbc in 6 months. Advised on iron rich foods. No signs of acute bleed. No other complaints.     7. Type 2 diabetes mellitus without complication, without long-term current use of insulin (HCC)  Diabetes: A1c is 7 done 3/11/2025 which shows hyperglycemia and borderline control, maintain vigilance and increase activity and medications as listed.  Diabetic Medications: Not currently on Diabetic meds. She is diet controlled.   Diabetic Complication(s): Hyperglycemia: A1c 7% 3/11/2025, .  CKD 2 (GFR 77).   Cataract history  CAD   F/u 6 months   - CMP in 6 months; Future  - Hemoglobin A1C in 6 months; Future  - Microalb/Creat Ratio, Random Urine in 6 months; Future    8. Breast cancer screening by mammogram  - [facility] DIAMOND 2D+3D SCREENING BILAT (CPT=77067/22246); Future    9. Rheumatoid arthritis, seropositive, multiple sites (HCC)  Controlled with xeljanz     10. Use of cane as ambulatory aid      11. Refused influenza vaccine    - Influenza Refused    12. Microalbuminuria  Continue losartan.       Reinforced healthy diet, lifestyle, and exercise.      Return in about 6 months (around 9/14/2025) for medication refills, blood pressure.     Huey aMdrigal, DO     Supplementary Documentation:   General Health:  In the past six months, have you lost more than 10 pounds without trying?: 2 - No  Has your appetite been poor?: No  Type of Diet: Balanced  How does the patient maintain a good energy level?: Appropriate Exercise  How would you describe your daily physical activity?: Light  How would you describe your current health state?:  Fair  How do you maintain positive mental well-being?: Visiting Friends  On a scale of 0 to 10, with 0 being no pain and 10 being severe pain, what is your pain level?: 6 - (Moderate)  In the past six months, have you experienced urine leakage?: 1-Yes  At any time do you feel concerned for the safety/well-being of yourself and/or your children, in your home or elsewhere?: Yes  Have you had any immunizations at another office such as Influenza, Hepatitis B, Tetanus, or Pneumococcal?: No         Wagner Walsh's SCREENING SCHEDULE   Tests on this list are recommended by your physician but may not be covered, or covered at this frequency, by your insurer.   Please check with your insurance carrier before scheduling to verify coverage.   PREVENTATIVE SERVICES FREQUENCY &  COVERAGE DETAILS LAST COMPLETION DATE   Diabetes Screening    Fasting Blood Sugar /  Glucose    One screening every 12 months if never tested or if previously tested but not diagnosed with pre-diabetes   One screening every 6 months if diagnosed with pre-diabetes Lab Results   Component Value Date     (H) 03/11/2025        Cardiovascular Disease Screening    Lipid Panel  Cholesterol  Lipoprotein (HDL)  Triglycerides Covered every 5 years for all Medicare beneficiaries without apparent signs or symptoms of cardiovascular disease Lab Results   Component Value Date    CHOLEST 206 (H) 09/13/2024     (H) 09/13/2024    LDL 80 09/13/2024    LDLD 153 02/27/2018    TRIG 94 09/13/2024         Electrocardiogram (EKG)   Covered if needed at Welcome to Medicare, and non-screening if indicated for medical reasons 09/14/2014      Ultrasound Screening for Abdominal Aortic Aneurysm (AAA) Covered once in a lifetime for one of the following risk factors    Men who are 65-75 years old and have ever smoked    Anyone with a family history -     Colorectal Cancer Screening  Covered for ages 50-85; only need ONE of the following:    Colonoscopy   Covered every 10  years    Covered every 2 years if patient is at high risk or previous colonoscopy was abnormal -    No recommendations at this time    Flexible Sigmoidoscopy   Covered every 4 years -    Fecal Occult Blood Test Covered annually -   Bone Density Screening    Bone density screening    Covered every 2 years after age 65 if diagnosed with risk of osteoporosis or estrogen deficiency.    Covered yearly for long-term glucocorticoid medication use (Steroids) Last Dexa Scan:    XR DEXA BONE DENSITOMETRY (CPT=77080) 03/05/2024      No recommendations at this time   Pap and Pelvic    Pap   Covered every 2 years for women at normal risk; Annually if at high risk -  No recommendations at this time    Chlamydia Annually if high risk -  No recommendations at this time   Screening Mammogram    Mammogram     Recommend annually for all female patients aged 40 and older    One baseline mammogram covered for patients aged 35-39 03/05/2024    Health Maintenance   Topic Date Due    Mammogram  03/05/2025       Immunizations    Influenza Covered once per flu season  Please get every year -  No recommendations at this time    Pneumococcal Each vaccine ([id number] & [id number]) covered once after 65 Prevnar 13: 05/16/2018    [id number]: 06/17/2019     No recommendations at this time    Hepatitis B One screening covered for patients with certain risk factors   -  No recommendations at this time    Tetanus Toxoid Not covered by Medicare Part B unless medically necessary (cut with metal); may be covered with your pharmacy prescription benefits -    Tetanus, Diptheria and Pertusis TD and TDaP Not covered by Medicare Part B -  No recommendations at this time    Zoster Not covered by Medicare Part B; may be covered with your pharmacy  prescription benefits -  No recommendations at this time     Diabetes      Hemoglobin A1C Annually; if last result is elevated, may be asked to retest more frequently.    Medicare covers every 3 months Lab Results    Component Value Date     (H) 03/11/2025    A1C 7.0 (H) 03/11/2025       No recommendations at this time    Creat/alb ratio Annually Lab Results   Component Value Date    MICROALBCREA 374.3 (H) 03/11/2025       LDL Annually Lab Results   Component Value Date    LDL 80 09/13/2024       Dilated Eye Exam Annually Last Diabetic Eye Exam:  Last Dilated Eye Exam: 03/14/24  Eye Exam shows Diabetic Retinopathy?: No       Annual Monitoring of Persistent Medications (ACE/ARB, digoxin diuretics, anticonvulsants)    Potassium Annually Lab Results   Component Value Date    K 4.0 03/11/2025         Creatinine   Annually Lab Results   Component Value Date    CREATSERUM 0.76 03/11/2025         BUN Annually Lab Results   Component Value Date    BUN 17 03/11/2025       Drug Serum Conc Annually No results found for: \"DIGOXIN\", \"DIG\", \"VALP\"

## 2025-03-28 DIAGNOSIS — M06.9 RHEUMATOID ARTHRITIS INVOLVING BOTH HANDS, UNSPECIFIED WHETHER RHEUMATOID FACTOR PRESENT (HCC): ICD-10-CM

## 2025-03-28 DIAGNOSIS — M06.9 RHEUMATOID ARTHRITIS INVOLVING MULTIPLE SITES, UNSPECIFIED WHETHER RHEUMATOID FACTOR PRESENT (HCC): ICD-10-CM

## 2025-03-28 RX ORDER — TOFACITINIB 11 MG/1
1 TABLET, FILM COATED, EXTENDED RELEASE ORAL DAILY
Qty: 90 TABLET | Refills: 0 | Status: SHIPPED | OUTPATIENT
Start: 2025-03-28

## 2025-03-28 NOTE — TELEPHONE ENCOUNTER
LOV: 12/03/2024    Future Appointments   Date Time Provider Department Center   4/9/2025 12:40 PM PF [facility] [place]   6/3/2025 10:40 AM Ana Peterson MD [facility] [address]       LF: 03/10/2025    QTY: 30    Refills: 0    LABS:   Component      Latest Ref Rng 3/11/2025   WBC      4.0 - 11.0 x10(3) uL 3.6 (L)    RBC      3.80 - 5.30 x10(6)uL 3.40 (L)    Hemoglobin      12.0 - 16.0 g/dL 11.3 (L)    Hematocrit      35.0 - 48.0 % 33.6 (L)    Platelet Count      150.0 - 450.0 10(3)uL 256.0    MCV      80.0 - 100.0 fL 98.8    MCH      26.0 - 34.0 pg 33.2    MCHC      31.0 - 37.0 g/dL 33.6    RDW      % 12.5    Prelim Neutrophil Abs      1.50 - 7.70 x10 (3) uL 2.24    Neutrophils Absolute      1.50 - 7.70 x10(3) uL 2.24    Lymphocytes Absolute      1.00 - 4.00 x10(3) uL 0.99 (L)    Monocytes Absolute      0.10 - 1.00 x10(3) uL 0.29    Eosinophils Absolute      0.00 - 0.70 x10(3) uL 0.06    Basophils Absolute      0.00 - 0.20 x10(3) uL 0.02    Immature Granulocyte Absolute      0.00 - 1.00 x10(3) uL 0.01    Neutrophils %      % 62.0    Lymphocytes %      % 27.4    Monocytes %      % 8.0    Eosinophils %      % 1.7    Basophils %      % 0.6    Immature Granulocyte %      % 0.3    Glucose      70 - 99 mg/dL 137 (H)    Sodium      136 - 145 mmol/L 143    Potassium      3.5 - 5.1 mmol/L 4.0    Chloride      98 - 112 mmol/L 106    Carbon Dioxide, Total      21.0 - 32.0 mmol/L 28.0    ANION GAP      0 - 18 mmol/L 9    BUN      9 - 23 mg/dL 17    CREATININE      0.55 - 1.02 mg/dL 0.76    CALCIUM      8.7 - 10.6 mg/dL 10.0    CALCULATED OSMOLALITY      275 - 295 mOsm/kg 300 (H)    EGFR      >=60 mL/min/1.73m2 77    AST (SGOT)      <34 U/L 27    ALT (SGPT)      10 - 49 U/L 18    ALKALINE PHOSPHATASE      55 - 142 U/L 46 (L)    Total Bilirubin      0.2 - 1.1 mg/dL 0.6    PROTEIN, TOTAL      5.7 - 8.2 g/dL 8.0    Albumin      3.2 - 4.8 g/dL 5.3 (H)    Globulin      2.0 - 3.5 g/dL 2.7    A/G Ratio      1.0 - 2.0   2.0    Patient Fasting for CMP? Yes    SED RATE      0 - 30 mm/Hr 15       Legend:  (L) Low  (H) High

## 2025-04-09 ENCOUNTER — HOSPITAL ENCOUNTER (OUTPATIENT)
Dept: MAMMOGRAPHY | Age: 85
Discharge: HOME | End: 2025-04-09
Attending: FAMILY MEDICINE
Payer: MEDICARE

## 2025-04-09 DIAGNOSIS — Z12.31 BREAST CANCER SCREENING BY MAMMOGRAM: ICD-10-CM

## 2025-04-09 PROCEDURE — 77067 SCR MAMMO BI INCL CAD: CPT | Performed by: FAMILY MEDICINE

## 2025-04-09 PROCEDURE — 77063 BREAST TOMOSYNTHESIS BI: CPT | Performed by: FAMILY MEDICINE

## 2025-04-18 ENCOUNTER — TELEPHONE (OUTPATIENT)
Facility: CLINIC | Age: 85
End: 2025-04-18

## 2025-04-18 NOTE — TELEPHONE ENCOUNTER
Pt called and would like to speak with Nurse Cash directly regarding her xeljanz. Pt aware that cash is out of the office, but can wait until next week for a call back.     Pt did not want to elaborate.

## 2025-04-21 ENCOUNTER — TELEPHONE (OUTPATIENT)
Dept: RHEUMATOLOGY | Facility: CLINIC | Age: 85
End: 2025-04-21

## 2025-04-21 NOTE — TELEPHONE ENCOUNTER
Called pt, pt has been reading the product guide for Xeljanz and would like to stop taking the medication. Patient having muscle aches, and feels the medication is no longer working. Explained she should schedule a follow up to discuss with Dr. Peterson. Appt offered for tomorrow, and not able to take this. Patient aware that we will add her to the cancellation list to be seen sooner.

## 2025-04-21 NOTE — TELEPHONE ENCOUNTER
Called patient, explained that she does this every year and we have tried going off this medication and she goes into a bad flare up     Let her know the pain she is having is arthritis related and not related to medication     advised her to stay on medication until I can see her. Offered her medrol dose, and tylenol arthritis. Pt declines medrol dose pack at this time. Will call our office if she changes her mind.

## 2025-04-23 NOTE — TELEPHONE ENCOUNTER
Patient called to say she just received a text reminder for her appointment on 4/24/2025.  I mentioned that she just scheduled the appointment on 4/21/2025.  She said she has another appointment and she wants to reschedule.  She was rescheduled to first opening which is 7/30/2025

## 2025-05-28 ENCOUNTER — TELEPHONE (OUTPATIENT)
Dept: RHEUMATOLOGY | Facility: CLINIC | Age: 85
End: 2025-05-28

## 2025-05-28 NOTE — TELEPHONE ENCOUNTER
Patient called office. States she currently feels who body fatigue, pain to right wrist and \"puffy\" bilateral lower legs and feet.   States when she end of March started she had headache, nerve pain and c/o pressure behind ear. She wanted Dr. Peterson to be aware of the symptoms. Inquiring if she should continue with Xeljanz or wait to talk with Dr. Peterson at next appointment 7/30/25.    Requesting a sooner appointment. Transferred to .

## 2025-05-29 ENCOUNTER — TELEPHONE (OUTPATIENT)
Dept: FAMILY MEDICINE CLINIC | Facility: CLINIC | Age: 85
End: 2025-05-29

## 2025-05-29 ENCOUNTER — TELEPHONE (OUTPATIENT)
Dept: RHEUMATOLOGY | Facility: CLINIC | Age: 85
End: 2025-05-29

## 2025-05-29 DIAGNOSIS — M06.9 RHEUMATOID ARTHRITIS INVOLVING MULTIPLE SITES, UNSPECIFIED WHETHER RHEUMATOID FACTOR PRESENT (HCC): Primary | ICD-10-CM

## 2025-05-29 DIAGNOSIS — M06.9 RHEUMATOID ARTHRITIS INVOLVING BOTH HANDS, UNSPECIFIED WHETHER RHEUMATOID FACTOR PRESENT (HCC): ICD-10-CM

## 2025-05-29 NOTE — TELEPHONE ENCOUNTER
Spoke with patient and informed of the following recommendation from Dr. Peterson. Understanding verbalized. Aware to obtain labs. States she will call PCP for appointment.    I would like her to see her PCP if she could have an infection or something else going on  I would like her to get a CBC CMP and a sed rate  Uric acid level  I will place these orders please have her get her labs she is overdue for her CMP we need to make sure her kidney is fine can cause swelling in the legs  Strongly advised her to see her PCP in the meantime

## 2025-05-29 NOTE — TELEPHONE ENCOUNTER
Future Appointments   Date Time Provider Department Center   5/30/2025  7:30 AM Robert Calixto MD [facility] [address] [facility] [address]   7/30/2025  1:40 PM Ana Peterson MD [facility] [address]

## 2025-05-29 NOTE — TELEPHONE ENCOUNTER
Patient calling, patient with possible issue/infection per Dr Peterson. Patient referred back to PCP, patient to do labs ordered by Rheum. Please advise on poss appointment with PCP.

## 2025-05-30 ENCOUNTER — OFFICE VISIT (OUTPATIENT)
Dept: FAMILY MEDICINE CLINIC | Facility: CLINIC | Age: 85
End: 2025-05-30
Payer: MEDICARE

## 2025-05-30 ENCOUNTER — LAB ENCOUNTER (OUTPATIENT)
Dept: LAB | Age: 85
End: 2025-05-30
Attending: INTERNAL MEDICINE
Payer: MEDICARE

## 2025-05-30 VITALS
HEART RATE: 76 BPM | RESPIRATION RATE: 16 BRPM | TEMPERATURE: 98 F | WEIGHT: 123 LBS | DIASTOLIC BLOOD PRESSURE: 78 MMHG | BODY MASS INDEX: 24.8 KG/M2 | HEIGHT: 59 IN | OXYGEN SATURATION: 98 % | SYSTOLIC BLOOD PRESSURE: 168 MMHG

## 2025-05-30 DIAGNOSIS — M06.9 RHEUMATOID ARTHRITIS INVOLVING BOTH HANDS, UNSPECIFIED WHETHER RHEUMATOID FACTOR PRESENT (HCC): ICD-10-CM

## 2025-05-30 DIAGNOSIS — M06.9 RHEUMATOID ARTHRITIS INVOLVING MULTIPLE SITES, UNSPECIFIED WHETHER RHEUMATOID FACTOR PRESENT (HCC): ICD-10-CM

## 2025-05-30 DIAGNOSIS — M19.90 INFLAMMATORY ARTHRITIS: Primary | ICD-10-CM

## 2025-05-30 DIAGNOSIS — I10 ESSENTIAL HYPERTENSION: ICD-10-CM

## 2025-05-30 LAB
ALBUMIN SERPL-MCNC: 5.2 G/DL (ref 3.2–4.8)
ALBUMIN/GLOB SERPL: 1.9 {RATIO} (ref 1–2)
ALP LIVER SERPL-CCNC: 47 U/L (ref 55–142)
ALT SERPL-CCNC: 25 U/L (ref 10–49)
ANION GAP SERPL CALC-SCNC: 11 MMOL/L (ref 0–18)
AST SERPL-CCNC: 32 U/L (ref ?–34)
BASOPHILS # BLD AUTO: 0.01 X10(3) UL (ref 0–0.2)
BASOPHILS NFR BLD AUTO: 0.3 %
BILIRUB SERPL-MCNC: 0.8 MG/DL (ref 0.2–1.1)
BUN BLD-MCNC: 20 MG/DL (ref 9–23)
CALCIUM BLD-MCNC: 10 MG/DL (ref 8.7–10.6)
CHLORIDE SERPL-SCNC: 103 MMOL/L (ref 98–112)
CO2 SERPL-SCNC: 26 MMOL/L (ref 21–32)
CREAT BLD-MCNC: 0.72 MG/DL (ref 0.55–1.02)
EGFRCR SERPLBLD CKD-EPI 2021: 82 ML/MIN/1.73M2 (ref 60–?)
EOSINOPHIL # BLD AUTO: 0.03 X10(3) UL (ref 0–0.7)
EOSINOPHIL NFR BLD AUTO: 0.8 %
ERYTHROCYTE [DISTWIDTH] IN BLOOD BY AUTOMATED COUNT: 12.6 %
ERYTHROCYTE [SEDIMENTATION RATE] IN BLOOD: 7 MM/HR (ref 0–30)
FASTING STATUS PATIENT QL REPORTED: YES
GLOBULIN PLAS-MCNC: 2.8 G/DL (ref 2–3.5)
GLUCOSE BLD-MCNC: 143 MG/DL (ref 70–99)
HCT VFR BLD AUTO: 34.4 % (ref 35–48)
HGB BLD-MCNC: 11.8 G/DL (ref 12–16)
IMM GRANULOCYTES # BLD AUTO: 0.01 X10(3) UL (ref 0–1)
IMM GRANULOCYTES NFR BLD: 0.3 %
LYMPHOCYTES # BLD AUTO: 0.94 X10(3) UL (ref 1–4)
LYMPHOCYTES NFR BLD AUTO: 23.9 %
MCH RBC QN AUTO: 32.9 PG (ref 26–34)
MCHC RBC AUTO-ENTMCNC: 34.3 G/DL (ref 31–37)
MCV RBC AUTO: 95.8 FL (ref 80–100)
MONOCYTES # BLD AUTO: 0.26 X10(3) UL (ref 0.1–1)
MONOCYTES NFR BLD AUTO: 6.6 %
NEUTROPHILS # BLD AUTO: 2.69 X10 (3) UL (ref 1.5–7.7)
NEUTROPHILS # BLD AUTO: 2.69 X10(3) UL (ref 1.5–7.7)
NEUTROPHILS NFR BLD AUTO: 68.1 %
OSMOLALITY SERPL CALC.SUM OF ELEC: 295 MOSM/KG (ref 275–295)
PLATELET # BLD AUTO: 238 10(3)UL (ref 150–450)
POTASSIUM SERPL-SCNC: 4.2 MMOL/L (ref 3.5–5.1)
PROT SERPL-MCNC: 8 G/DL (ref 5.7–8.2)
RBC # BLD AUTO: 3.59 X10(6)UL (ref 3.8–5.3)
SODIUM SERPL-SCNC: 140 MMOL/L (ref 136–145)
URATE SERPL-MCNC: 4.2 MG/DL (ref 3.1–7.8)
WBC # BLD AUTO: 3.9 X10(3) UL (ref 4–11)

## 2025-05-30 PROCEDURE — 84550 ASSAY OF BLOOD/URIC ACID: CPT

## 2025-05-30 PROCEDURE — 1159F MED LIST DOCD IN RCRD: CPT | Performed by: FAMILY MEDICINE

## 2025-05-30 PROCEDURE — 99214 OFFICE O/P EST MOD 30 MIN: CPT | Performed by: FAMILY MEDICINE

## 2025-05-30 PROCEDURE — 80053 COMPREHEN METABOLIC PANEL: CPT

## 2025-05-30 PROCEDURE — G2211 COMPLEX E/M VISIT ADD ON: HCPCS | Performed by: FAMILY MEDICINE

## 2025-05-30 PROCEDURE — 85025 COMPLETE CBC W/AUTO DIFF WBC: CPT

## 2025-05-30 PROCEDURE — 85652 RBC SED RATE AUTOMATED: CPT

## 2025-05-30 PROCEDURE — 36415 COLL VENOUS BLD VENIPUNCTURE: CPT

## 2025-05-30 RX ORDER — METHYLPREDNISOLONE 4 MG/1
TABLET ORAL
Qty: 1 EACH | Refills: 0 | Status: SHIPPED | OUTPATIENT
Start: 2025-05-30

## 2025-05-30 NOTE — PROGRESS NOTES
Subjective:   Patient ID: Wagner Walsh is a 85 year old female.    HPI  Ms. Walsh is an 85-year-old female with known history of diabetes mellitus, chronic kidney disease, rheumatoid arthritis on Xeljanz, Sjogren's syndrome, coronary disease, hypertension, hyperlipidemia presenting today for bilateral foot pain associate with swelling over the past few weeks.    She noticed some redness on her toes but swelling and the redness seems to have gotten better.      No fever no cough no chest pain no shortness of breath no nausea no vomiting no abdominal pain    She does have pain from other joints of her body including her hips and shoulders but attributes it to rheumatoid arthritis.      I had reviewed past medical and family histories together with allergy and medication lists documented.          History/Other:   Review of Systems   Constitutional:  Negative for fatigue and fever.   HENT:  Negative for sore throat and trouble swallowing.    Respiratory:  Negative for cough and shortness of breath.    Cardiovascular:  Negative for chest pain.   Gastrointestinal:  Negative for abdominal pain, nausea and vomiting.     Current Medications[1]  Allergies:Allergies[2]    Objective:   Physical Exam  Vitals reviewed.   Constitutional:       General: She is not in acute distress.  HENT:      Mouth/Throat:      Mouth: Mucous membranes are moist.      Pharynx: Oropharynx is clear.   Eyes:      General: No scleral icterus.     Conjunctiva/sclera: Conjunctivae normal.   Cardiovascular:      Rate and Rhythm: Normal rate and regular rhythm.      Heart sounds: Normal heart sounds. No murmur heard.  Pulmonary:      Effort: Pulmonary effort is normal. No respiratory distress.      Breath sounds: Normal breath sounds. No wheezing or rales.   Abdominal:      General: Bowel sounds are normal. There is no distension.      Palpations: Abdomen is soft.      Tenderness: There is no abdominal tenderness.   Musculoskeletal:      Cervical back:  Neck supple.      Right lower leg: No edema.      Left lower leg: No edema.   Feet:      Comments: Minimal swelling noted on both feet.  There was mild pain elicited on the 1st and 2nd metatarsal regions bilaterally.  No erythema no warmth.  Normal DP pulses.  Lymphadenopathy:      Cervical: No cervical adenopathy.   Skin:     General: Skin is warm.   Neurological:      Mental Status: She is alert.   Psychiatric:         Mood and Affect: Mood normal.         Behavior: Behavior normal.         Assessment & Plan:   1. Inflammatory arthritis   - Suspect related to rheumatoid arthritis  - Trial of Medrol Dosepak  - Continue management of rheumatoid arthritis per rheumatology   2. Essential hypertension   - Elevated  - She does have a history of whitecoat hypertension  - Will monitor  - Continue losartan   Call or come in sooner if symptoms worsen or persist    This note was prepared using Dragon Medical voice recognition dictation software. As a result errors may occur. When identified these errors have been corrected. While every attempt is made to correct errors during dictation discrepancies may still exist            No orders of the defined types were placed in this encounter.      Meds This Visit:  Requested Prescriptions     Signed Prescriptions Disp Refills    methylPREDNISolone (MEDROL) 4 MG Oral Tablet Therapy Pack 1 each 0     Sig: As directed.       Imaging & Referrals:  None         [1]   Current Outpatient Medications   Medication Sig Dispense Refill    methylPREDNISolone (MEDROL) 4 MG Oral Tablet Therapy Pack As directed. 1 each 0    XELJANZ XR 11 MG Oral Tablet 24 Hr TAKE 1 TABLET BY MOUTH 1 TIME A DAY 90 tablet 0    losartan 50 MG Oral Tab Take 1 tablet (50 mg total) by mouth nightly. 90 tablet 3    rosuvastatin 10 MG Oral Tab Take 1 tablet (10 mg total) by mouth 3 (three) times a week. Pt is taking this 3 times a week only 90 tablet 3    Ascorbic Acid (VITAMIN C) 1000 MG Oral Tab Take 1 tablet (1,000  mg total) by mouth daily.      cyanocobalamin 1000 MCG Oral Tab Take 1 tablet (1,000 mcg total) by mouth twice a week.      ACCU-CHEK GUIDE In Vitro Strip USE 1 STRIP TO CHECK GLUCOSE ONCE DAILY 100 strip 0    ACCU-CHEK FASTCLIX LANCETS Does not apply Misc USE TO CHECK GLUCOSE ONCE DAILY AS DIRECTED 102 each 2    Vitamin D3 2000 units Oral Cap Take 1 capsule (2,000 Units total) by mouth daily.     [2]   Allergies  Allergen Reactions    Diclofenac HIVES     Topical, hives/rash of bilateral dorsal distal forearms    Penicillins SHORTNESS OF BREATH    Whey OTHER (SEE COMMENTS)    Statins MYALGIA    Zetia [Ezetimibe] MYALGIA and OTHER (SEE COMMENTS)     Back aches.    Zocor [Simvastatin] MYALGIA    Colestipol UNKNOWN    Perfumes UNKNOWN    Mold Coughing

## 2025-05-30 NOTE — PATIENT INSTRUCTIONS
Thank you for choosing Robert Calixto MD at [facility]  To Do: Wagner Walsh  1. Please see below   Call [phone] to schedule the appointment.   Please signup for [employer]idSys, which is electronic access to your record if you have not done so.  All your results will post on there.  [email or web address]   You can NOW use VidSys to book your appointments with us, or consider using open access scheduling which is through the [place] website [email or web address] and type in Robert Calixto MD and follow the links for \"Schedule Online Now\"    To schedule Imaging or tests at [facility] call Central Scheduling [phone], Go to [facility] ER Building (For example: CT scans, X rays, Ultrasound, MRI)  Cardiac Testing in ER building Building A second floor Cardiac Testing [phone] (For example: Holter Monitor, Cardiac Stress tests,Event Monitor, or 2D Echocardiograms)  [facility] call [phone] usually in [facility]  Walk in Clinic in [place] at [address] Mon-Fri at 8am-7:30 p.m., and Sat/Sun 9:00a.m.-4:30 p.m.  Also at [address] [place] [address]  Call [phone] for info     Please call our office about any questions regarding your treatment/medicines/tests as a result of today's visit.  For your safety, read the entire package insert of all medicines prescribed to you and be aware of all of the risks of treatment even beyond those discussed today.  All therapies have potential risk of harm or side effects or medication interactions.  It is your duty and for your safety to discuss with the pharmacist and our office with questions, and to notify us and stop treatment if problems arise, but know that our intention is that the benefits outweigh those potential risks and we strive to make you healthier and to improve your quality of life.    Referrals, and Radiology Information:    If your insurance requires a referral to a specialist, please allow 5 business days to process your  referral request.    If Robert Calixto MD orders a CT or MRI, it may take up to 10 business days to receive approval from your insurance company. Once our office has called informing you that the insurance company approved your testing, please call Central Scheduling at [phone]  Please allow our office 5 business days to contact you regarding any testing results.    Refill policies:   Allow 3 business days for refills; controlled substances may take longer and must be picked up from the office in person.  Narcotic medications can only be filled in 30 day increments and must be refilled at an office visit only.  If your prescription is due for a refill, you may be due for a follow-up appointment.  We cannot refill your maintenance medications at a preventative wellness visit.  To best provide you care, patients receiving maintenance medications need to be seen at least twice a year.

## 2025-06-02 ENCOUNTER — TELEPHONE (OUTPATIENT)
Dept: RHEUMATOLOGY | Facility: CLINIC | Age: 85
End: 2025-06-02

## 2025-06-10 ENCOUNTER — TELEPHONE (OUTPATIENT)
Facility: CLINIC | Age: 85
End: 2025-06-10

## 2025-06-10 NOTE — TELEPHONE ENCOUNTER
Pt called the office wanting to be able to  lab orders today. I stated to pt that provider is out of the office and office is closed for the week. Clarified date, time, and location of next follow up appointment. Pt voiced understanding with no further action needed or assistance required from the office at this time.     Dr Peterson and staff-- see above and advise.

## 2025-06-12 NOTE — TELEPHONE ENCOUNTER
Left voicemail on patient's voicemail regarding her lab orders. Informed patient that we can mail her lab orders to her or she can come into the office on Monday to  the orders. Asked patient to call the office back and let us know what she would like to do.

## 2025-06-26 ENCOUNTER — TELEPHONE (OUTPATIENT)
Dept: FAMILY MEDICINE CLINIC | Facility: CLINIC | Age: 85
End: 2025-06-26

## 2025-06-26 RX ORDER — BLOOD SUGAR DIAGNOSTIC
1 STRIP MISCELLANEOUS DAILY
Qty: 100 STRIP | Refills: 1 | Status: SHIPPED | OUTPATIENT
Start: 2025-06-26 | End: 2025-10-04

## 2025-07-03 ENCOUNTER — TELEPHONE (OUTPATIENT)
Dept: FAMILY MEDICINE CLINIC | Facility: CLINIC | Age: 85
End: 2025-07-03

## 2025-07-03 RX ORDER — BLOOD SUGAR DIAGNOSTIC
1 STRIP MISCELLANEOUS DAILY
Qty: 100 EACH | Refills: 1 | Status: SHIPPED | OUTPATIENT
Start: 2025-07-03

## 2025-07-03 RX ORDER — LANCETS
1 EACH MISCELLANEOUS DAILY
Qty: 102 EACH | Refills: 2 | Status: SHIPPED | OUTPATIENT
Start: 2025-07-03 | End: 2026-07-03

## 2025-07-03 NOTE — TELEPHONE ENCOUNTER
Patient is calling to follow up on medication request.  Pharmacy told patient to contact the office as they did not receive the request.  Strips and needles needed.  This will need to be a new rx request with change of Primary Care Provider.      Medication Quantity Refills Start End   ACCU-CHEK GUIDE In Vitro Strip 100 strip 0 9/13/2022 --   Sig:   USE 1 STRIP TO CHECK GLUCOSE ONCE DAILY     Route:   (none)       [facility] [address] [facility] [address] [place] - [address] [phone], [phone]    [No] : In the past 12 months have you used drugs other than those required for medical reasons? No [One fall no injury in past year] : Patient reported one fall in the past year without injury [Assistive Device] : Patient uses an assistive device [I have developed a follow-up plan documented below in the note.] : I have developed a follow-up plan documented below in the note. [Time Spent: ___ Minutes] : I spent [unfilled] minutes performing a depression screening for this patient. [Never] : Never [de-identified] : cane as needed [de-identified] : Refer to  tab

## 2025-07-14 ENCOUNTER — TELEPHONE (OUTPATIENT)
Facility: CLINIC | Age: 85
End: 2025-07-14

## 2025-07-24 NOTE — TELEPHONE ENCOUNTER
Spoke to patient, patient is no longer taking Xeljanz due to increased blood pressure, and all over joint pain. Since stopping the medication, her symptoms has improved. Patient does not want us to send refills of the medication to her pharmacy. Patient has upcoming appt with Dr. Peterson on 7/30, and will discuss further at that time.

## 2025-07-24 NOTE — TELEPHONE ENCOUNTER
Phoned patient to remind her that she has said this for the past 4-5 years I have seen her  She flares up with her jionts when she goes off xeljanz but we can try again  It will take 3-4 months for it to get out of her system  We have tried this before  She may do worse even sooner being off 2-4 weeks  We can discuss again at clinic    Patient voiced understanding.

## 2025-07-30 ENCOUNTER — OFFICE VISIT (OUTPATIENT)
Dept: RHEUMATOLOGY | Facility: CLINIC | Age: 85
End: 2025-07-30
Payer: MEDICARE

## 2025-07-30 VITALS
OXYGEN SATURATION: 96 % | HEIGHT: 59 IN | WEIGHT: 125 LBS | BODY MASS INDEX: 25.2 KG/M2 | TEMPERATURE: 98 F | RESPIRATION RATE: 16 BRPM | DIASTOLIC BLOOD PRESSURE: 80 MMHG | HEART RATE: 87 BPM | SYSTOLIC BLOOD PRESSURE: 136 MMHG

## 2025-07-30 DIAGNOSIS — M06.9 RHEUMATOID ARTHRITIS INVOLVING MULTIPLE SITES, UNSPECIFIED WHETHER RHEUMATOID FACTOR PRESENT (HCC): Primary | ICD-10-CM

## 2025-07-30 DIAGNOSIS — M35.00 SJOGREN'S SYNDROME, WITH UNSPECIFIED ORGAN INVOLVEMENT (HCC): ICD-10-CM

## 2025-07-30 PROCEDURE — 1160F RVW MEDS BY RX/DR IN RCRD: CPT | Performed by: INTERNAL MEDICINE

## 2025-07-30 PROCEDURE — 1159F MED LIST DOCD IN RCRD: CPT | Performed by: INTERNAL MEDICINE

## 2025-07-30 PROCEDURE — 99214 OFFICE O/P EST MOD 30 MIN: CPT | Performed by: INTERNAL MEDICINE

## 2025-07-30 RX ORDER — HYDROXYCHLOROQUINE SULFATE 200 MG/1
200 TABLET, FILM COATED ORAL DAILY
Qty: 30 TABLET | Refills: 3 | Status: SHIPPED | OUTPATIENT
Start: 2025-07-30

## 2025-07-31 ENCOUNTER — LAB ENCOUNTER (OUTPATIENT)
Dept: LAB | Age: 85
End: 2025-07-31
Attending: INTERNAL MEDICINE

## 2025-07-31 DIAGNOSIS — M35.00 SJOGREN'S SYNDROME, WITH UNSPECIFIED ORGAN INVOLVEMENT (HCC): ICD-10-CM

## 2025-07-31 DIAGNOSIS — M06.9 RHEUMATOID ARTHRITIS INVOLVING MULTIPLE SITES, UNSPECIFIED WHETHER RHEUMATOID FACTOR PRESENT (HCC): ICD-10-CM

## 2025-07-31 LAB
ALBUMIN SERPL-MCNC: 5.1 G/DL (ref 3.2–4.8)
ALBUMIN/GLOB SERPL: 1.8 (ref 1–2)
ALP LIVER SERPL-CCNC: 50 U/L (ref 55–142)
ALT SERPL-CCNC: 22 U/L (ref 10–49)
ANION GAP SERPL CALC-SCNC: 10 MMOL/L (ref 0–18)
AST SERPL-CCNC: 29 U/L (ref ?–34)
BASOPHILS # BLD AUTO: 0.01 X10(3) UL (ref 0–0.2)
BASOPHILS NFR BLD AUTO: 0.2 %
BILIRUB SERPL-MCNC: 0.6 MG/DL (ref 0.2–1.1)
BUN BLD-MCNC: 18 MG/DL (ref 9–23)
CALCIUM BLD-MCNC: 9.4 MG/DL (ref 8.7–10.6)
CHLORIDE SERPL-SCNC: 105 MMOL/L (ref 98–112)
CO2 SERPL-SCNC: 27 MMOL/L (ref 21–32)
CREAT BLD-MCNC: 0.78 MG/DL (ref 0.55–1.02)
EGFRCR SERPLBLD CKD-EPI 2021: 75 ML/MIN/1.73M2 (ref 60–?)
EOSINOPHIL # BLD AUTO: 0.14 X10(3) UL (ref 0–0.7)
EOSINOPHIL NFR BLD AUTO: 3 %
ERYTHROCYTE [DISTWIDTH] IN BLOOD BY AUTOMATED COUNT: 12.4 %
ERYTHROCYTE [SEDIMENTATION RATE] IN BLOOD: 12 MM/HR (ref 0–30)
FASTING STATUS PATIENT QL REPORTED: YES
GLOBULIN PLAS-MCNC: 2.9 G/DL (ref 2–3.5)
GLUCOSE BLD-MCNC: 168 MG/DL (ref 70–99)
HCT VFR BLD AUTO: 33.3 % (ref 35–48)
HGB BLD-MCNC: 11.3 G/DL (ref 12–16)
IMM GRANULOCYTES # BLD AUTO: 0.01 X10(3) UL (ref 0–1)
IMM GRANULOCYTES NFR BLD: 0.2 %
LYMPHOCYTES # BLD AUTO: 1.29 X10(3) UL (ref 1–4)
LYMPHOCYTES NFR BLD AUTO: 27.4 %
MCH RBC QN AUTO: 32.8 PG (ref 26–34)
MCHC RBC AUTO-ENTMCNC: 33.9 G/DL (ref 31–37)
MCV RBC AUTO: 96.8 FL (ref 80–100)
MONOCYTES # BLD AUTO: 0.33 X10(3) UL (ref 0.1–1)
MONOCYTES NFR BLD AUTO: 7 %
NEUTROPHILS # BLD AUTO: 2.93 X10 (3) UL (ref 1.5–7.7)
NEUTROPHILS # BLD AUTO: 2.93 X10(3) UL (ref 1.5–7.7)
NEUTROPHILS NFR BLD AUTO: 62.2 %
OSMOLALITY SERPL CALC.SUM OF ELEC: 300 MOSM/KG (ref 275–295)
PLATELET # BLD AUTO: 286 10(3)UL (ref 150–450)
POTASSIUM SERPL-SCNC: 4.1 MMOL/L (ref 3.5–5.1)
PROT SERPL-MCNC: 8 G/DL (ref 5.7–8.2)
RBC # BLD AUTO: 3.44 X10(6)UL (ref 3.8–5.3)
RHEUMATOID FACT SERPL-ACNC: 34.1 IU/ML (ref ?–14)
SODIUM SERPL-SCNC: 142 MMOL/L (ref 136–145)
WBC # BLD AUTO: 4.7 X10(3) UL (ref 4–11)

## 2025-07-31 PROCEDURE — 85652 RBC SED RATE AUTOMATED: CPT

## 2025-07-31 PROCEDURE — 86235 NUCLEAR ANTIGEN ANTIBODY: CPT

## 2025-07-31 PROCEDURE — 36415 COLL VENOUS BLD VENIPUNCTURE: CPT

## 2025-07-31 PROCEDURE — 86431 RHEUMATOID FACTOR QUANT: CPT

## 2025-07-31 PROCEDURE — 80053 COMPREHEN METABOLIC PANEL: CPT

## 2025-07-31 PROCEDURE — 86200 CCP ANTIBODY: CPT | Performed by: INTERNAL MEDICINE

## 2025-07-31 PROCEDURE — 85025 COMPLETE CBC W/AUTO DIFF WBC: CPT

## 2025-08-01 LAB
CCP IGG SERPL-ACNC: 5.5 U/ML (ref 0–6.9)
ENA SS-A IGG SER IA-ACNC: 0.9 U/ML (ref ?–7)
ENA SS-B IGG SER IA-ACNC: <0.4 U/ML (ref ?–7)

## 2025-08-08 ENCOUNTER — TELEPHONE (OUTPATIENT)
Dept: RHEUMATOLOGY | Facility: CLINIC | Age: 85
End: 2025-08-08

## (undated) DIAGNOSIS — E11.9 TYPE 2 DIABETES MELLITUS WITHOUT COMPLICATION, WITHOUT LONG-TERM CURRENT USE OF INSULIN (HCC): ICD-10-CM

## (undated) DEVICE — BANDAGE ADH 1INX3IN NAT FAB N ADH PD CURAD

## (undated) DEVICE — GLOVE SUR 6.5 SENSICARE PIP WHT PWD F

## (undated) DEVICE — REMOVER LOT 4OZ N IRRIG UNSCNT SFT MOIST LIQ

## (undated) DEVICE — NEEDLE SPNL 22GA L3.5IN BLK QNCKE STYL DISP

## (undated) DEVICE — SKIN REG/FINE DUAL MARKER, RULER, LABELS: Brand: MEDLINE

## (undated) DEVICE — GLOVE SUR 7.5 SENSICARE PIP WHT PWD F

## (undated) DEVICE — PAIN TRAY: Brand: MEDLINE INDUSTRIES, INC.

## (undated) NOTE — LETTER
September 6, 2023         Deny Akhtar, [address] [facility]      Patient: Rebecca Juarez   YOB: 1940   Date of Visit: 9/6/2023       Dear Dr. Pennie Fernandez MD,    I saw your patient, Rebecca Juarez, on 9/6/2023. Enclosed is my consultation / progress note from that encounter. Thank you for allowing me to participate in the care of this patient.     Sincerely,                           Yvonne Guerra MD  [facility], [address] [place] [phone]    Document electronically generated by:  Yvonne Guerra MD on 9/6/2023    CC: No Recipients    Enclosure

## (undated) NOTE — LETTER
06/09/18        Dear: Osman Caldera  In an effort to provide you with the best possible care for your diabetes, we ask that you please schedule the following appointment(s) as indicated below.   Our records indicate that you are in need of the following:

## (undated) NOTE — LETTER
Date: 7/18/2018    Patient Name: Naya Barton          To Whom it may concern: The above patient is recommended to participate in warm pool therapy.         Sincerely,    Theo Reyes DO

## (undated) NOTE — LETTER
08/06/21        Wagner Walsh  [address] [place] [address]  [place] [id number]      Dear Mynor Guerrero,    Our records indicate that you have outstanding lab work and or testing that was ordered for you and has not yet been completed:  Orders Placed This Encounter

## (undated) NOTE — LETTER
06/12/19        Wagner Walsh  [address] [facility] [address]      Dear Jann Perry,    Our records indicate that you have outstanding lab work and or testing that was ordered for you and has not yet been completed:  Orders Placed This Encounter

## (undated) NOTE — ED AVS SNAPSHOT
Starlet Litten   MRN: [id number]    Department:  THE [facility] Department in [place]   Date of Visit:  4/27/2019           Disclosure     Insurance plans vary and the physician(s) referred by the ER may not be covered by your plan.  Please contact tell this physician (or your personal doctor if your instructions are to return to your personal doctor) about any new or lasting problems. The primary care or specialist physician will see patients referred from the [facility] Emergency Department.  Dane Appiah

## (undated) NOTE — Clinical Note
Hi, Dr. Jeyson Carson, DO. Thank you for referring Ms. Teto Tabor for rheumatologic evaluation. Please see the discussion portion of my note and let me know if you have any questions.  VICTORINO Martinez [id number]/27/2018